# Patient Record
Sex: MALE | Race: WHITE | Employment: OTHER | URBAN - METROPOLITAN AREA
[De-identification: names, ages, dates, MRNs, and addresses within clinical notes are randomized per-mention and may not be internally consistent; named-entity substitution may affect disease eponyms.]

---

## 2017-05-02 ENCOUNTER — LAB ENCOUNTER (OUTPATIENT)
Dept: LAB | Facility: HOSPITAL | Age: 67
End: 2017-05-02
Attending: INTERNAL MEDICINE
Payer: MEDICARE

## 2017-05-02 DIAGNOSIS — Z12.5 SCREENING PSA (PROSTATE SPECIFIC ANTIGEN): ICD-10-CM

## 2017-05-02 DIAGNOSIS — E78.00 PURE HYPERCHOLESTEROLEMIA: Primary | ICD-10-CM

## 2017-05-02 PROCEDURE — 85025 COMPLETE CBC W/AUTO DIFF WBC: CPT

## 2017-05-02 PROCEDURE — 36415 COLL VENOUS BLD VENIPUNCTURE: CPT

## 2017-05-02 PROCEDURE — 80053 COMPREHEN METABOLIC PANEL: CPT

## 2017-05-02 PROCEDURE — 80061 LIPID PANEL: CPT

## 2017-12-05 ENCOUNTER — LAB ENCOUNTER (OUTPATIENT)
Dept: LAB | Facility: HOSPITAL | Age: 67
End: 2017-12-05
Attending: INTERNAL MEDICINE
Payer: MEDICARE

## 2017-12-05 DIAGNOSIS — E78.5 HYPERLIPIDEMIA: ICD-10-CM

## 2017-12-05 DIAGNOSIS — R73.01 IMPAIRED FASTING GLUCOSE: ICD-10-CM

## 2017-12-05 DIAGNOSIS — Z12.5 ENCOUNTER FOR SCREENING FOR MALIGNANT NEOPLASM OF PROSTATE: Primary | ICD-10-CM

## 2017-12-05 PROCEDURE — 80061 LIPID PANEL: CPT

## 2017-12-05 PROCEDURE — 36415 COLL VENOUS BLD VENIPUNCTURE: CPT

## 2017-12-05 PROCEDURE — 85025 COMPLETE CBC W/AUTO DIFF WBC: CPT

## 2017-12-05 PROCEDURE — 83036 HEMOGLOBIN GLYCOSYLATED A1C: CPT

## 2017-12-05 PROCEDURE — 80053 COMPREHEN METABOLIC PANEL: CPT

## 2019-02-12 ENCOUNTER — LAB ENCOUNTER (OUTPATIENT)
Dept: LAB | Facility: HOSPITAL | Age: 69
End: 2019-02-12
Attending: INTERNAL MEDICINE
Payer: MEDICARE

## 2019-02-12 DIAGNOSIS — E78.5 HYPERLIPEMIA: ICD-10-CM

## 2019-02-12 DIAGNOSIS — R03.0 ELEVATED BLOOD PRESSURE READING WITHOUT DIAGNOSIS OF HYPERTENSION: ICD-10-CM

## 2019-02-12 DIAGNOSIS — Z12.5 SPECIAL SCREENING FOR MALIGNANT NEOPLASM OF PROSTATE: ICD-10-CM

## 2019-02-12 DIAGNOSIS — R73.03 BORDERLINE DIABETES: Primary | ICD-10-CM

## 2019-02-12 LAB
ALBUMIN SERPL-MCNC: 3.8 G/DL (ref 3.4–5)
ALP LIVER SERPL-CCNC: 84 U/L (ref 45–117)
ALT SERPL-CCNC: 39 U/L (ref 16–61)
ANION GAP SERPL CALC-SCNC: 8 MMOL/L (ref 0–18)
AST SERPL-CCNC: 38 U/L (ref 15–37)
BASOPHILS # BLD AUTO: 0.09 X10(3) UL (ref 0–0.2)
BASOPHILS NFR BLD AUTO: 1 %
BILIRUB DIRECT SERPL-MCNC: 0.1 MG/DL (ref 0–0.2)
BILIRUB SERPL-MCNC: 0.6 MG/DL (ref 0.1–2)
BILIRUB UR QL: NEGATIVE
BUN BLD-MCNC: 14 MG/DL (ref 7–18)
BUN/CREAT SERPL: 14.3 (ref 10–20)
CALCIUM BLD-MCNC: 8.8 MG/DL (ref 8.5–10.1)
CHLORIDE SERPL-SCNC: 104 MMOL/L (ref 98–107)
CHOLEST SMN-MCNC: 173 MG/DL (ref ?–200)
CLARITY UR: CLEAR
CO2 SERPL-SCNC: 27 MMOL/L (ref 21–32)
COLOR UR: YELLOW
COMPLEXED PSA SERPL-MCNC: 1.87 NG/ML (ref ?–4)
CREAT BLD-MCNC: 0.98 MG/DL (ref 0.7–1.3)
DEPRECATED RDW RBC AUTO: 44.4 FL (ref 35.1–46.3)
EOSINOPHIL # BLD AUTO: 0.42 X10(3) UL (ref 0–0.7)
EOSINOPHIL NFR BLD AUTO: 4.8 %
ERYTHROCYTE [DISTWIDTH] IN BLOOD BY AUTOMATED COUNT: 13.2 % (ref 11–15)
EST. AVERAGE GLUCOSE BLD GHB EST-MCNC: 137 MG/DL (ref 68–126)
GLUCOSE BLD-MCNC: 118 MG/DL (ref 70–99)
GLUCOSE UR-MCNC: NEGATIVE MG/DL
HBA1C MFR BLD HPLC: 6.4 % (ref ?–5.7)
HCT VFR BLD AUTO: 46.1 % (ref 39–53)
HDLC SERPL-MCNC: 44 MG/DL (ref 40–59)
HGB BLD-MCNC: 15.5 G/DL (ref 13–17.5)
HGB UR QL STRIP.AUTO: NEGATIVE
IMM GRANULOCYTES # BLD AUTO: 0.03 X10(3) UL (ref 0–1)
IMM GRANULOCYTES NFR BLD: 0.3 %
KETONES UR-MCNC: NEGATIVE MG/DL
LDLC SERPL CALC-MCNC: 99 MG/DL (ref ?–100)
LEUKOCYTE ESTERASE UR QL STRIP.AUTO: NEGATIVE
LYMPHOCYTES # BLD AUTO: 3.26 X10(3) UL (ref 1–4)
LYMPHOCYTES NFR BLD AUTO: 37 %
M PROTEIN MFR SERPL ELPH: 8.1 G/DL (ref 6.4–8.2)
MCH RBC QN AUTO: 31.1 PG (ref 26–34)
MCHC RBC AUTO-ENTMCNC: 33.6 G/DL (ref 31–37)
MCV RBC AUTO: 92.4 FL (ref 80–100)
MONOCYTES # BLD AUTO: 0.8 X10(3) UL (ref 0.1–1)
MONOCYTES NFR BLD AUTO: 9.1 %
NEUTROPHILS # BLD AUTO: 4.2 X10 (3) UL (ref 1.5–7.7)
NEUTROPHILS # BLD AUTO: 4.2 X10(3) UL (ref 1.5–7.7)
NEUTROPHILS NFR BLD AUTO: 47.8 %
NITRITE UR QL STRIP.AUTO: NEGATIVE
NONHDLC SERPL-MCNC: 129 MG/DL (ref ?–130)
OSMOLALITY SERPL CALC.SUM OF ELEC: 290 MOSM/KG (ref 275–295)
PH UR: 5 [PH] (ref 5–8)
PLATELET # BLD AUTO: 276 10(3)UL (ref 150–450)
POTASSIUM SERPL-SCNC: 4.2 MMOL/L (ref 3.5–5.1)
PROT UR-MCNC: NEGATIVE MG/DL
RBC # BLD AUTO: 4.99 X10(6)UL (ref 3.8–5.8)
SODIUM SERPL-SCNC: 139 MMOL/L (ref 136–145)
SP GR UR STRIP: 1.02 (ref 1–1.03)
TRIGL SERPL-MCNC: 148 MG/DL (ref 30–149)
UROBILINOGEN UR STRIP-ACNC: <2
VIT C UR-MCNC: NEGATIVE MG/DL
WBC # BLD AUTO: 8.8 X10(3) UL (ref 4–11)

## 2019-02-12 PROCEDURE — 81003 URINALYSIS AUTO W/O SCOPE: CPT

## 2019-02-12 PROCEDURE — 80048 BASIC METABOLIC PNL TOTAL CA: CPT

## 2019-02-12 PROCEDURE — 85025 COMPLETE CBC W/AUTO DIFF WBC: CPT

## 2019-02-12 PROCEDURE — 80076 HEPATIC FUNCTION PANEL: CPT

## 2019-02-12 PROCEDURE — 80061 LIPID PANEL: CPT

## 2019-02-12 PROCEDURE — 83036 HEMOGLOBIN GLYCOSYLATED A1C: CPT

## 2019-02-12 PROCEDURE — 36415 COLL VENOUS BLD VENIPUNCTURE: CPT

## 2019-02-25 ENCOUNTER — LAB ENCOUNTER (OUTPATIENT)
Dept: LAB | Facility: HOSPITAL | Age: 69
End: 2019-02-25
Attending: INTERNAL MEDICINE
Payer: MEDICARE

## 2019-02-25 DIAGNOSIS — Z11.59 ENCOUNTER FOR SCREENING FOR OTHER VIRAL DISEASES: Primary | ICD-10-CM

## 2019-02-25 PROCEDURE — 86803 HEPATITIS C AB TEST: CPT

## 2019-02-25 PROCEDURE — 36415 COLL VENOUS BLD VENIPUNCTURE: CPT

## 2019-02-26 LAB — HCV AB SERPL QL IA: NONREACTIVE

## 2021-04-18 ENCOUNTER — LAB ENCOUNTER (OUTPATIENT)
Dept: LAB | Facility: HOSPITAL | Age: 71
End: 2021-04-18
Attending: INTERNAL MEDICINE
Payer: MEDICARE

## 2021-04-18 DIAGNOSIS — F41.9 ANXIETY HYPERVENTILATION: ICD-10-CM

## 2021-04-18 DIAGNOSIS — F45.8 ANXIETY HYPERVENTILATION: ICD-10-CM

## 2021-04-18 PROCEDURE — 80053 COMPREHEN METABOLIC PANEL: CPT

## 2021-04-18 PROCEDURE — 36415 COLL VENOUS BLD VENIPUNCTURE: CPT

## 2021-04-18 PROCEDURE — 84443 ASSAY THYROID STIM HORMONE: CPT

## 2021-04-18 PROCEDURE — 80061 LIPID PANEL: CPT

## 2021-04-18 PROCEDURE — 85025 COMPLETE CBC W/AUTO DIFF WBC: CPT

## 2021-05-11 ENCOUNTER — ORDER TRANSCRIPTION (OUTPATIENT)
Dept: SLEEP CENTER | Age: 71
End: 2021-05-11

## 2021-05-11 DIAGNOSIS — G47.33 OBSTRUCTIVE SLEEP APNEA (ADULT) (PEDIATRIC): Primary | ICD-10-CM

## 2021-10-05 ENCOUNTER — OFFICE VISIT (OUTPATIENT)
Dept: SLEEP CENTER | Age: 71
End: 2021-10-05
Attending: INTERNAL MEDICINE
Payer: MEDICARE

## 2021-10-05 DIAGNOSIS — G47.33 OBSTRUCTIVE SLEEP APNEA (ADULT) (PEDIATRIC): ICD-10-CM

## 2021-10-05 DIAGNOSIS — G47.33 OSA (OBSTRUCTIVE SLEEP APNEA): Primary | ICD-10-CM

## 2021-10-05 PROCEDURE — 95806 SLEEP STUDY UNATT&RESP EFFT: CPT

## 2021-10-07 NOTE — PROCEDURES
[address] [facility]  Accredited by the [facility] ([facility])    PATIENT'S NAME: Robert Mora PHYSICIAN: Monserrat Macias MD   REFERRING PHYSICIAN: Monserrat Macias MD   PATIENT ACCOUNT #: [de-identified] [facility]

## 2022-02-07 ENCOUNTER — LAB ENCOUNTER (OUTPATIENT)
Dept: LAB | Facility: HOSPITAL | Age: 72
End: 2022-02-07
Attending: INTERNAL MEDICINE
Payer: MEDICARE

## 2022-02-07 DIAGNOSIS — E11.9 DIABETES MELLITUS (HCC): Primary | ICD-10-CM

## 2022-02-07 DIAGNOSIS — Z13.220 SCREENING FOR LIPOID DISORDERS: ICD-10-CM

## 2022-02-07 LAB
ALBUMIN SERPL-MCNC: 4 G/DL (ref 3.4–5)
ALBUMIN/GLOB SERPL: 1 {RATIO} (ref 1–2)
ALP LIVER SERPL-CCNC: 87 U/L
ALT SERPL-CCNC: 40 U/L
ANION GAP SERPL CALC-SCNC: 7 MMOL/L (ref 0–18)
AST SERPL-CCNC: 26 U/L (ref 15–37)
BASOPHILS # BLD AUTO: 0.1 X10(3) UL (ref 0–0.2)
BASOPHILS NFR BLD AUTO: 1.1 %
BILIRUB SERPL-MCNC: 0.5 MG/DL (ref 0.1–2)
BUN BLD-MCNC: 10 MG/DL (ref 7–18)
BUN/CREAT SERPL: 9.4 (ref 10–20)
CALCIUM BLD-MCNC: 9.6 MG/DL (ref 8.5–10.1)
CHLORIDE SERPL-SCNC: 103 MMOL/L (ref 98–112)
CO2 SERPL-SCNC: 27 MMOL/L (ref 21–32)
CREAT BLD-MCNC: 1.06 MG/DL
CREAT UR-SCNC: 144 MG/DL
DEPRECATED RDW RBC AUTO: 46.1 FL (ref 35.1–46.3)
EOSINOPHIL # BLD AUTO: 0.38 X10(3) UL (ref 0–0.7)
EOSINOPHIL NFR BLD AUTO: 4.3 %
ERYTHROCYTE [DISTWIDTH] IN BLOOD BY AUTOMATED COUNT: 13.2 % (ref 11–15)
FASTING PATIENT LIPID ANSWER: YES
FASTING STATUS PATIENT QL REPORTED: YES
GLOBULIN PLAS-MCNC: 4.2 G/DL (ref 2.8–4.4)
GLUCOSE BLD-MCNC: 135 MG/DL (ref 70–99)
HCT VFR BLD AUTO: 48.8 %
HDLC SERPL-MCNC: 43 MG/DL (ref 40–59)
HGB BLD-MCNC: 16.1 G/DL
IMM GRANULOCYTES # BLD AUTO: 0.04 X10(3) UL (ref 0–1)
LDLC SERPL CALC-MCNC: 91 MG/DL (ref ?–100)
LYMPHOCYTES # BLD AUTO: 3.12 X10(3) UL (ref 1–4)
LYMPHOCYTES NFR BLD AUTO: 35.3 %
MCH RBC QN AUTO: 31.1 PG (ref 26–34)
MCHC RBC AUTO-ENTMCNC: 33 G/DL (ref 31–37)
MCV RBC AUTO: 94.2 FL
MICROALBUMIN UR-MCNC: 18.7 MG/DL
MICROALBUMIN/CREAT 24H UR-RTO: 129.9 UG/MG (ref ?–30)
MONOCYTES # BLD AUTO: 0.61 X10(3) UL (ref 0.1–1)
MONOCYTES NFR BLD AUTO: 6.9 %
NEUTROPHILS # BLD AUTO: 4.6 X10 (3) UL (ref 1.5–7.7)
NEUTROPHILS # BLD AUTO: 4.6 X10(3) UL (ref 1.5–7.7)
NEUTROPHILS NFR BLD AUTO: 51.9 %
NONHDLC SERPL-MCNC: 114 MG/DL (ref ?–130)
OSMOLALITY SERPL CALC.SUM OF ELEC: 285 MOSM/KG (ref 275–295)
PLATELET # BLD AUTO: 254 10(3)UL (ref 150–450)
POTASSIUM SERPL-SCNC: 4.5 MMOL/L (ref 3.5–5.1)
PROT SERPL-MCNC: 8.2 G/DL (ref 6.4–8.2)
RBC # BLD AUTO: 5.18 X10(6)UL
SODIUM SERPL-SCNC: 137 MMOL/L (ref 136–145)
TRIGL SERPL-MCNC: 127 MG/DL (ref 30–149)
VLDLC SERPL CALC-MCNC: 21 MG/DL (ref 0–30)
WBC # BLD AUTO: 8.9 X10(3) UL (ref 4–11)

## 2022-02-07 PROCEDURE — 80053 COMPREHEN METABOLIC PANEL: CPT

## 2022-02-07 PROCEDURE — 80061 LIPID PANEL: CPT

## 2022-02-07 PROCEDURE — 36415 COLL VENOUS BLD VENIPUNCTURE: CPT

## 2022-02-07 PROCEDURE — 82043 UR ALBUMIN QUANTITATIVE: CPT

## 2022-02-07 PROCEDURE — 82570 ASSAY OF URINE CREATININE: CPT

## 2022-02-07 PROCEDURE — 85025 COMPLETE CBC W/AUTO DIFF WBC: CPT

## 2023-03-30 ENCOUNTER — LAB ENCOUNTER (OUTPATIENT)
Dept: LAB | Facility: HOSPITAL | Age: 73
End: 2023-03-30
Attending: INTERNAL MEDICINE
Payer: MEDICARE

## 2023-03-30 DIAGNOSIS — E11.9 DIABETES MELLITUS (HCC): Primary | ICD-10-CM

## 2023-03-30 LAB
ALBUMIN SERPL-MCNC: 3.7 G/DL (ref 3.4–5)
ALBUMIN/GLOB SERPL: 1 {RATIO} (ref 1–2)
ALP LIVER SERPL-CCNC: 74 U/L
ALT SERPL-CCNC: 26 U/L
ANION GAP SERPL CALC-SCNC: 5 MMOL/L (ref 0–18)
AST SERPL-CCNC: 21 U/L (ref 15–37)
BASOPHILS # BLD AUTO: 0.07 X10(3) UL (ref 0–0.2)
BASOPHILS NFR BLD AUTO: 0.9 %
BILIRUB SERPL-MCNC: 0.5 MG/DL (ref 0.1–2)
BUN BLD-MCNC: 14 MG/DL (ref 7–18)
BUN/CREAT SERPL: 17.5 (ref 10–20)
CALCIUM BLD-MCNC: 8.9 MG/DL (ref 8.5–10.1)
CHLORIDE SERPL-SCNC: 105 MMOL/L (ref 98–112)
CHOLEST SERPL-MCNC: 138 MG/DL (ref ?–200)
CO2 SERPL-SCNC: 30 MMOL/L (ref 21–32)
CREAT BLD-MCNC: 0.8 MG/DL
CREAT UR-SCNC: 17.4 MG/DL
DEPRECATED RDW RBC AUTO: 49.7 FL (ref 35.1–46.3)
EOSINOPHIL # BLD AUTO: 0.23 X10(3) UL (ref 0–0.7)
EOSINOPHIL NFR BLD AUTO: 3 %
ERYTHROCYTE [DISTWIDTH] IN BLOOD BY AUTOMATED COUNT: 13.9 % (ref 11–15)
FASTING PATIENT LIPID ANSWER: NO
FASTING STATUS PATIENT QL REPORTED: NO
GFR SERPLBLD BASED ON 1.73 SQ M-ARVRAT: 94 ML/MIN/1.73M2 (ref 60–?)
GLOBULIN PLAS-MCNC: 3.7 G/DL (ref 2.8–4.4)
GLUCOSE BLD-MCNC: 103 MG/DL (ref 70–99)
HCT VFR BLD AUTO: 43.3 %
HDLC SERPL-MCNC: 68 MG/DL (ref 40–59)
HGB BLD-MCNC: 14.5 G/DL
IMM GRANULOCYTES # BLD AUTO: 0.01 X10(3) UL (ref 0–1)
IMM GRANULOCYTES NFR BLD: 0.1 %
LDLC SERPL CALC-MCNC: 47 MG/DL (ref ?–100)
LYMPHOCYTES # BLD AUTO: 2.65 X10(3) UL (ref 1–4)
LYMPHOCYTES NFR BLD AUTO: 34.1 %
MCH RBC QN AUTO: 32.2 PG (ref 26–34)
MCHC RBC AUTO-ENTMCNC: 33.5 G/DL (ref 31–37)
MCV RBC AUTO: 96.2 FL
MICROALBUMIN UR-MCNC: 1.6 MG/DL
MICROALBUMIN/CREAT 24H UR-RTO: 92 UG/MG (ref ?–30)
MONOCYTES # BLD AUTO: 0.63 X10(3) UL (ref 0.1–1)
MONOCYTES NFR BLD AUTO: 8.1 %
NEUTROPHILS # BLD AUTO: 4.18 X10 (3) UL (ref 1.5–7.7)
NEUTROPHILS # BLD AUTO: 4.18 X10(3) UL (ref 1.5–7.7)
NEUTROPHILS NFR BLD AUTO: 53.8 %
NONHDLC SERPL-MCNC: 70 MG/DL (ref ?–130)
OSMOLALITY SERPL CALC.SUM OF ELEC: 291 MOSM/KG (ref 275–295)
PLATELET # BLD AUTO: 217 10(3)UL (ref 150–450)
POTASSIUM SERPL-SCNC: 4.2 MMOL/L (ref 3.5–5.1)
PROT SERPL-MCNC: 7.4 G/DL (ref 6.4–8.2)
RBC # BLD AUTO: 4.5 X10(6)UL
SODIUM SERPL-SCNC: 140 MMOL/L (ref 136–145)
TRIGL SERPL-MCNC: 138 MG/DL (ref 30–149)
VLDLC SERPL CALC-MCNC: 19 MG/DL (ref 0–30)
WBC # BLD AUTO: 7.8 X10(3) UL (ref 4–11)

## 2023-03-30 PROCEDURE — 36415 COLL VENOUS BLD VENIPUNCTURE: CPT

## 2023-03-30 PROCEDURE — 85025 COMPLETE CBC W/AUTO DIFF WBC: CPT

## 2023-03-30 PROCEDURE — 80061 LIPID PANEL: CPT

## 2023-03-30 PROCEDURE — 80053 COMPREHEN METABOLIC PANEL: CPT

## 2023-03-30 PROCEDURE — 82570 ASSAY OF URINE CREATININE: CPT

## 2023-03-30 PROCEDURE — 82043 UR ALBUMIN QUANTITATIVE: CPT

## 2024-03-21 ENCOUNTER — APPOINTMENT (OUTPATIENT)
Dept: CT IMAGING | Facility: HOSPITAL | Age: 74
End: 2024-03-21
Attending: EMERGENCY MEDICINE
Payer: MEDICARE

## 2024-03-21 ENCOUNTER — ANESTHESIA (OUTPATIENT)
Dept: SURGERY | Facility: HOSPITAL | Age: 74
End: 2024-03-21
Payer: MEDICARE

## 2024-03-21 ENCOUNTER — APPOINTMENT (OUTPATIENT)
Dept: GENERAL RADIOLOGY | Facility: HOSPITAL | Age: 74
DRG: 219 | End: 2024-03-21
Attending: EMERGENCY MEDICINE
Payer: MEDICARE

## 2024-03-21 ENCOUNTER — ANESTHESIA EVENT (OUTPATIENT)
Dept: SURGERY | Facility: HOSPITAL | Age: 74
End: 2024-03-21
Payer: MEDICARE

## 2024-03-21 ENCOUNTER — APPOINTMENT (OUTPATIENT)
Dept: GENERAL RADIOLOGY | Facility: HOSPITAL | Age: 74
DRG: 219 | End: 2024-03-21
Attending: CLINICAL NURSE SPECIALIST
Payer: MEDICARE

## 2024-03-21 ENCOUNTER — APPOINTMENT (OUTPATIENT)
Dept: GENERAL RADIOLOGY | Facility: HOSPITAL | Age: 74
End: 2024-03-21
Attending: EMERGENCY MEDICINE
Payer: MEDICARE

## 2024-03-21 ENCOUNTER — HOSPITAL ENCOUNTER (INPATIENT)
Facility: HOSPITAL | Age: 74
LOS: 7 days | Discharge: HOME | DRG: 219 | End: 2024-03-28
Attending: EMERGENCY MEDICINE | Admitting: HOSPITALIST
Payer: MEDICARE

## 2024-03-21 ENCOUNTER — HOSPITAL ENCOUNTER (INPATIENT)
Facility: HOSPITAL | Age: 74
LOS: 7 days | Discharge: HOME HEALTH | End: 2024-03-28
Attending: EMERGENCY MEDICINE | Admitting: HOSPITALIST
Payer: MEDICARE

## 2024-03-21 ENCOUNTER — APPOINTMENT (OUTPATIENT)
Dept: CT IMAGING | Facility: HOSPITAL | Age: 74
DRG: 219 | End: 2024-03-21
Attending: EMERGENCY MEDICINE
Payer: MEDICARE

## 2024-03-21 ENCOUNTER — APPOINTMENT (OUTPATIENT)
Dept: GENERAL RADIOLOGY | Facility: HOSPITAL | Age: 74
End: 2024-03-21
Attending: CLINICAL NURSE SPECIALIST
Payer: MEDICARE

## 2024-03-21 DIAGNOSIS — I71.13 RUPTURED ANEURYSM OF DESCENDING THORACIC AORTA (HCC): ICD-10-CM

## 2024-03-21 DIAGNOSIS — Z98.890 S/P AORTIC DISSECTION REPAIR: ICD-10-CM

## 2024-03-21 DIAGNOSIS — I71.03 DISSECTION OF THORACOABDOMINAL AORTA (HCC): Primary | ICD-10-CM

## 2024-03-21 LAB
ANION GAP SERPL CALC-SCNC: 8 MMOL/L (ref 0–18)
ANION GAP SERPL CALC-SCNC: 8 MMOL/L (ref 0–18)
ANTIBODY SCREEN: NEGATIVE
APTT PPP: 29.4 SECONDS (ref 23.3–35.6)
APTT PPP: 37.3 SECONDS (ref 23.3–35.6)
ATRIAL RATE: 44 BPM
ATRIAL RATE: 51 BPM
BASE EXCESS BLD CALC-SCNC: 1.9 MMOL/L (ref ?–2)
BASE EXCESS BLD CALC-SCNC: 4.1 MMOL/L (ref ?–2)
BASE EXCESS BLDA CALC-SCNC: -2 MMOL/L (ref ?–30)
BASE EXCESS BLDA CALC-SCNC: -3 MMOL/L (ref ?–30)
BASE EXCESS BLDA CALC-SCNC: -4 MMOL/L (ref ?–30)
BASE EXCESS BLDA CALC-SCNC: -4 MMOL/L (ref ?–30)
BASE EXCESS BLDA CALC-SCNC: -6 MMOL/L (ref ?–30)
BASOPHILS # BLD AUTO: 0.01 X10(3) UL (ref 0–0.2)
BASOPHILS # BLD AUTO: 0.05 X10(3) UL (ref 0–0.2)
BASOPHILS NFR BLD AUTO: 0.1 %
BASOPHILS NFR BLD AUTO: 0.6 %
BUN BLD-MCNC: 17 MG/DL (ref 9–23)
BUN BLD-MCNC: 18 MG/DL (ref 9–23)
BUN/CREAT SERPL: 17.5 (ref 10–20)
BUN/CREAT SERPL: 21.8 (ref 10–20)
CA-I BLD-SCNC: 0.98 MMOL/L (ref 0.95–1.32)
CA-I BLDA-SCNC: 0.52 MMOL/L (ref 1.12–1.32)
CA-I BLDA-SCNC: 1.06 MMOL/L (ref 1.12–1.32)
CA-I BLDA-SCNC: 1.09 MMOL/L (ref 1.12–1.32)
CA-I BLDA-SCNC: 1.12 MMOL/L (ref 1.12–1.32)
CA-I BLDA-SCNC: 1.22 MMOL/L (ref 1.12–1.32)
CALCIUM BLD-MCNC: 7.3 MG/DL (ref 8.7–10.4)
CALCIUM BLD-MCNC: 9.3 MG/DL (ref 8.7–10.4)
CHLORIDE SERPL-SCNC: 107 MMOL/L (ref 98–112)
CHLORIDE SERPL-SCNC: 113 MMOL/L (ref 98–112)
CO2 BLDA-SCNC: 19 MMOL/L (ref 22–32)
CO2 BLDA-SCNC: 24 MMOL/L (ref 22–32)
CO2 BLDA-SCNC: 24 MMOL/L (ref 22–32)
CO2 BLDA-SCNC: 25 MMOL/L (ref 22–32)
CO2 BLDA-SCNC: 25 MMOL/L (ref 22–32)
CO2 SERPL-SCNC: 25 MMOL/L (ref 21–32)
CO2 SERPL-SCNC: 25 MMOL/L (ref 21–32)
COHGB MFR BLD: 1.5 % (ref 0–3)
CREAT BLD-MCNC: 0.78 MG/DL
CREAT BLD-MCNC: 1.03 MG/DL
D DIMER PPP FEU-MCNC: >20 UG/ML FEU (ref ?–0.73)
DEPRECATED RDW RBC AUTO: 47.3 FL (ref 35.1–46.3)
DEPRECATED RDW RBC AUTO: 47.4 FL (ref 35.1–46.3)
EGFRCR SERPLBLD CKD-EPI 2021: 77 ML/MIN/1.73M2 (ref 60–?)
EGFRCR SERPLBLD CKD-EPI 2021: 94 ML/MIN/1.73M2 (ref 60–?)
EOSINOPHIL # BLD AUTO: 0.03 X10(3) UL (ref 0–0.7)
EOSINOPHIL # BLD AUTO: 0.3 X10(3) UL (ref 0–0.7)
EOSINOPHIL NFR BLD AUTO: 0.2 %
EOSINOPHIL NFR BLD AUTO: 3.9 %
ERYTHROCYTE [DISTWIDTH] IN BLOOD BY AUTOMATED COUNT: 13.6 % (ref 11–15)
ERYTHROCYTE [DISTWIDTH] IN BLOOD BY AUTOMATED COUNT: 13.8 % (ref 11–15)
EST. AVERAGE GLUCOSE BLD GHB EST-MCNC: 105 MG/DL (ref 68–126)
FIBRINOGEN PPP-MCNC: 195 MG/DL (ref 180–480)
FIBRINOGEN PPP-MCNC: 224 MG/DL (ref 180–480)
GLUCOSE BLD-MCNC: 135 MG/DL (ref 70–99)
GLUCOSE BLD-MCNC: 142 MG/DL (ref 70–99)
GLUCOSE BLDA-MCNC: 116 MG/DL (ref 70–99)
GLUCOSE BLDA-MCNC: 126 MG/DL (ref 70–99)
GLUCOSE BLDA-MCNC: 128 MG/DL (ref 70–99)
GLUCOSE BLDA-MCNC: 157 MG/DL (ref 70–99)
GLUCOSE BLDA-MCNC: 165 MG/DL (ref 70–99)
GLUCOSE BLDC GLUCOMTR-MCNC: 154 MG/DL (ref 70–99)
GLUCOSE BLDC GLUCOMTR-MCNC: 167 MG/DL (ref 70–99)
GLUCOSE BLDC GLUCOMTR-MCNC: 201 MG/DL (ref 70–99)
GLUCOSE BLDC GLUCOMTR-MCNC: 206 MG/DL (ref 70–99)
HBA1C MFR BLD: 5.3 % (ref ?–5.7)
HBV SURFACE AG SER-ACNC: 0.14 [IU]/L
HBV SURFACE AG SERPL QL IA: NONREACTIVE
HCO3 BLDA-SCNC: 18.4 MEQ/L (ref 22–26)
HCO3 BLDA-SCNC: 22.4 MEQ/L (ref 22–26)
HCO3 BLDA-SCNC: 22.7 MEQ/L (ref 22–26)
HCO3 BLDA-SCNC: 23.6 MEQ/L (ref 22–26)
HCO3 BLDA-SCNC: 24 MEQ/L (ref 22–26)
HCO3 BLDA-SCNC: 28.1 MEQ/L (ref 21–27)
HCO3 BLDV-SCNC: 25.6 MEQ/L (ref 22–26)
HCT VFR BLD AUTO: 26.6 %
HCT VFR BLD AUTO: 42.1 %
HCT VFR BLDA CALC: 21 %
HCT VFR BLDA CALC: 29 %
HCT VFR BLDA CALC: 30 %
HCT VFR BLDA CALC: 33 %
HCT VFR BLDA CALC: 41 %
HCV AB SERPL QL IA: NONREACTIVE
HGB BLD-MCNC: 13.8 G/DL
HGB BLD-MCNC: 9 G/DL
HGB BLD-MCNC: 9.5 G/DL
HIV 1+2 AB+HIV1 P24 AG SERPL QL IA: NONREACTIVE
IMM GRANULOCYTES # BLD AUTO: 0.03 X10(3) UL (ref 0–1)
IMM GRANULOCYTES # BLD AUTO: 0.07 X10(3) UL (ref 0–1)
IMM GRANULOCYTES NFR BLD: 0.4 %
IMM GRANULOCYTES NFR BLD: 0.6 %
INR BLD: 1.07 (ref 0.8–1.2)
INR BLD: 1.5 (ref 0.8–1.2)
ISTAT ACTIVATED CLOTTING TIME: 158 SECONDS (ref 125–137)
ISTAT ACTIVATED CLOTTING TIME: 158 SECONDS (ref 125–137)
ISTAT ACTIVATED CLOTTING TIME: 374 SECONDS (ref 125–137)
ISTAT ACTIVATED CLOTTING TIME: >675 SECONDS (ref 125–137)
LACTATE BLD-SCNC: 2.4 MMOL/L (ref 0.5–2)
LACTATE SERPL-SCNC: 1.2 MMOL/L (ref 0.5–2)
LYMPHOCYTES # BLD AUTO: 0.82 X10(3) UL (ref 1–4)
LYMPHOCYTES # BLD AUTO: 2.2 X10(3) UL (ref 1–4)
LYMPHOCYTES NFR BLD AUTO: 28.3 %
LYMPHOCYTES NFR BLD AUTO: 6.8 %
MAGNESIUM SERPL-MCNC: 2 MG/DL (ref 1.6–2.6)
MCH RBC QN AUTO: 31 PG (ref 26–34)
MCH RBC QN AUTO: 31.8 PG (ref 26–34)
MCHC RBC AUTO-ENTMCNC: 32.8 G/DL (ref 31–37)
MCHC RBC AUTO-ENTMCNC: 33.8 G/DL (ref 31–37)
MCV RBC AUTO: 94 FL
MCV RBC AUTO: 94.6 FL
METHGB MFR BLD: 0.1 % SAT (ref 0.4–1.5)
MONOCYTES # BLD AUTO: 0.53 X10(3) UL (ref 0.1–1)
MONOCYTES # BLD AUTO: 0.99 X10(3) UL (ref 0.1–1)
MONOCYTES NFR BLD AUTO: 6.8 %
MONOCYTES NFR BLD AUTO: 8.2 %
NEUTROPHILS # BLD AUTO: 10.1 X10 (3) UL (ref 1.5–7.7)
NEUTROPHILS # BLD AUTO: 10.1 X10(3) UL (ref 1.5–7.7)
NEUTROPHILS # BLD AUTO: 4.67 X10 (3) UL (ref 1.5–7.7)
NEUTROPHILS # BLD AUTO: 4.67 X10(3) UL (ref 1.5–7.7)
NEUTROPHILS NFR BLD AUTO: 60 %
NEUTROPHILS NFR BLD AUTO: 84.1 %
O2 CT BLD-SCNC: 13.3 VOL% (ref 15–23)
O2/TOTAL GAS SETTING VFR VENT: 50 %
O2/TOTAL GAS SETTING VFR VENT: 50 %
OSMOLALITY SERPL CALC.SUM OF ELEC: 294 MOSM/KG (ref 275–295)
OSMOLALITY SERPL CALC.SUM OF ELEC: 306 MOSM/KG (ref 275–295)
P AXIS: 66 DEGREES
P AXIS: 79 DEGREES
P-R INTERVAL: 274 MS
P-R INTERVAL: 324 MS
PCO2 BLDA: 29.1 MMHG (ref 35–45)
PCO2 BLDA: 41 MM HG (ref 35–45)
PCO2 BLDA: 43.9 MMHG (ref 35–45)
PCO2 BLDA: 44.2 MMHG (ref 35–45)
PCO2 BLDA: 49.9 MMHG (ref 35–45)
PCO2 BLDA: 49.9 MMHG (ref 35–45)
PCO2 BLDV: 50 MM HG (ref 38–50)
PEEP SETTING VENT: 5 CM H2O
PEEP SETTING VENT: 5 CM H2O
PH BLDA: 7.27 [PH] (ref 7.35–7.45)
PH BLDA: 7.28 [PH] (ref 7.35–7.45)
PH BLDA: 7.31 [PH] (ref 7.35–7.45)
PH BLDA: 7.35 [PH] (ref 7.35–7.45)
PH BLDA: 7.41 [PH] (ref 7.35–7.45)
PH BLDA: 7.45 [PH] (ref 7.35–7.45)
PH BLDV: 7.36 [PH] (ref 7.32–7.43)
PLATELET # BLD AUTO: 112 10(3)UL (ref 150–450)
PLATELET # BLD AUTO: 137 10(3)UL (ref 150–450)
PLATELET # BLD AUTO: 162 10(3)UL (ref 150–450)
PO2 BLDA: 171 MM HG (ref 80–100)
PO2 BLDA: 384 MMHG (ref 80–105)
PO2 BLDA: 389 MMHG (ref 80–105)
PO2 BLDA: >400 MMHG (ref 80–105)
PO2 BLDV: 36 MM HG (ref 35–40)
POTASSIUM BLD-SCNC: 3.9 MMOL/L (ref 3.6–5.1)
POTASSIUM SERPL-SCNC: 4.4 MMOL/L (ref 3.5–5.1)
POTASSIUM SERPL-SCNC: 4.6 MMOL/L (ref 3.5–5.1)
PRESSURE SUPPORT SETTING VENT: 10 CM H2O
PRESSURE SUPPORT SETTING VENT: 10 CM H2O
PROTHROMBIN TIME: 14.6 SECONDS (ref 11.6–14.8)
PROTHROMBIN TIME: 19 SECONDS (ref 11.6–14.8)
PUNCTURE CHARGE: NO
PUNCTURE CHARGE: NO
Q-T INTERVAL: 466 MS
Q-T INTERVAL: 526 MS
QRS DURATION: 142 MS
QRS DURATION: 142 MS
QTC CALCULATION (BEZET): 429 MS
QTC CALCULATION (BEZET): 449 MS
R AXIS: 55 DEGREES
R AXIS: 66 DEGREES
RBC # BLD AUTO: 2.83 X10(6)UL
RBC # BLD AUTO: 4.45 X10(6)UL
RESP RATE: 1 BPM
RESP RATE: 12 BPM
RH BLOOD TYPE: POSITIVE
RH BLOOD TYPE: POSITIVE
SAO2 % BLDA: 100 % (ref 92–100)
SAO2 % BLDA: 96.6 % (ref 94–100)
SAO2 % BLDV: 65.5 % (ref 60–85)
SODIUM BLD-SCNC: 141 MMOL/L (ref 135–145)
SODIUM BLDA-SCNC: 140 MMOL/L (ref 136–145)
SODIUM BLDA-SCNC: 141 MMOL/L (ref 136–145)
SODIUM BLDA-SCNC: 141 MMOL/L (ref 136–145)
SODIUM BLDA-SCNC: 142 MMOL/L (ref 136–145)
SODIUM BLDA-SCNC: 142 MMOL/L (ref 136–145)
SODIUM BLDA-SCNC: 3.9 MMOL/L (ref 3.6–5.1)
SODIUM BLDA-SCNC: 4.4 MMOL/L (ref 3.6–5.1)
SODIUM BLDA-SCNC: 4.5 MMOL/L (ref 3.6–5.1)
SODIUM BLDA-SCNC: 4.8 MMOL/L (ref 3.6–5.1)
SODIUM BLDA-SCNC: 5 MMOL/L (ref 3.6–5.1)
SODIUM SERPL-SCNC: 140 MMOL/L (ref 136–145)
SODIUM SERPL-SCNC: 146 MMOL/L (ref 136–145)
SPECIMEN VOL 24H UR: 600 ML
SPECIMEN VOL 24H UR: 800 ML
T AXIS: 45 DEGREES
T AXIS: 59 DEGREES
TSI SER-ACNC: 2.5 MIU/ML (ref 0.55–4.78)
VENTRICULAR RATE: 44 BPM
VENTRICULAR RATE: 51 BPM
WBC # BLD AUTO: 12 X10(3) UL (ref 4–11)
WBC # BLD AUTO: 7.8 X10(3) UL (ref 4–11)

## 2024-03-21 PROCEDURE — 02HV33Z INSERTION OF INFUSION DEVICE INTO SUPERIOR VENA CAVA, PERCUTANEOUS APPROACH: ICD-10-PCS | Performed by: THORACIC SURGERY (CARDIOTHORACIC VASCULAR SURGERY)

## 2024-03-21 PROCEDURE — 30233L1 TRANSFUSION OF NONAUTOLOGOUS FRESH PLASMA INTO PERIPHERAL VEIN, PERCUTANEOUS APPROACH: ICD-10-PCS | Performed by: THORACIC SURGERY (CARDIOTHORACIC VASCULAR SURGERY)

## 2024-03-21 PROCEDURE — 02RX0JZ REPLACEMENT OF THORACIC AORTA, ASCENDING/ARCH WITH SYNTHETIC SUBSTITUTE, OPEN APPROACH: ICD-10-PCS | Performed by: THORACIC SURGERY (CARDIOTHORACIC VASCULAR SURGERY)

## 2024-03-21 PROCEDURE — 71045 X-RAY EXAM CHEST 1 VIEW: CPT | Performed by: CLINICAL NURSE SPECIALIST

## 2024-03-21 PROCEDURE — 5A1221Z PERFORMANCE OF CARDIAC OUTPUT, CONTINUOUS: ICD-10-PCS | Performed by: THORACIC SURGERY (CARDIOTHORACIC VASCULAR SURGERY)

## 2024-03-21 PROCEDURE — XW03372 INTRODUCTION OF INACTIVATED COAGULATION FACTOR XA INTO PERIPHERAL VEIN, PERCUTANEOUS APPROACH, NEW TECHNOLOGY GROUP 2: ICD-10-PCS | Performed by: THORACIC SURGERY (CARDIOTHORACIC VASCULAR SURGERY)

## 2024-03-21 PROCEDURE — 99223 1ST HOSP IP/OBS HIGH 75: CPT | Performed by: HOSPITALIST

## 2024-03-21 PROCEDURE — 71045 X-RAY EXAM CHEST 1 VIEW: CPT | Performed by: EMERGENCY MEDICINE

## 2024-03-21 PROCEDURE — 74177 CT ABD & PELVIS W/CONTRAST: CPT | Performed by: EMERGENCY MEDICINE

## 2024-03-21 PROCEDURE — 74175 CTA ABDOMEN W/CONTRAST: CPT | Performed by: EMERGENCY MEDICINE

## 2024-03-21 PROCEDURE — 71275 CT ANGIOGRAPHY CHEST: CPT | Performed by: EMERGENCY MEDICINE

## 2024-03-21 PROCEDURE — B24BZZ4 ULTRASONOGRAPHY OF HEART WITH AORTA, TRANSESOPHAGEAL: ICD-10-PCS | Performed by: THORACIC SURGERY (CARDIOTHORACIC VASCULAR SURGERY)

## 2024-03-21 PROCEDURE — 3E033XZ INTRODUCTION OF VASOPRESSOR INTO PERIPHERAL VEIN, PERCUTANEOUS APPROACH: ICD-10-PCS | Performed by: THORACIC SURGERY (CARDIOTHORACIC VASCULAR SURGERY)

## 2024-03-21 PROCEDURE — 99223 1ST HOSP IP/OBS HIGH 75: CPT | Performed by: INTERNAL MEDICINE

## 2024-03-21 PROCEDURE — 36430 TRANSFUSION BLD/BLD COMPNT: CPT | Performed by: ANESTHESIOLOGY

## 2024-03-21 PROCEDURE — 93312 ECHO TRANSESOPHAGEAL: CPT | Performed by: ANESTHESIOLOGY

## 2024-03-21 DEVICE — IMPLANTABLE DEVICE: Type: IMPLANTABLE DEVICE | Site: HEART | Status: FUNCTIONAL

## 2024-03-21 DEVICE — BARD® PTFE FELT, 2.5 CM X 2.5 CM
Type: IMPLANTABLE DEVICE | Site: HEART | Status: FUNCTIONAL
Brand: BARD® PTFE FELT

## 2024-03-21 DEVICE — AGENT HEMSTAT 2X4IN HUM FIBRIN HUM THROM COMP: Type: IMPLANTABLE DEVICE | Site: HEART | Status: FUNCTIONAL

## 2024-03-21 RX ORDER — MORPHINE SULFATE 4 MG/ML
INJECTION, SOLUTION INTRAMUSCULAR; INTRAVENOUS
Status: COMPLETED
Start: 2024-03-21 | End: 2024-03-21

## 2024-03-21 RX ORDER — ROCURONIUM BROMIDE 10 MG/ML
INJECTION, SOLUTION INTRAVENOUS AS NEEDED
Status: DISCONTINUED | OUTPATIENT
Start: 2024-03-21 | End: 2024-03-21 | Stop reason: SURG

## 2024-03-21 RX ORDER — LIDOCAINE HYDROCHLORIDE 10 MG/ML
INJECTION, SOLUTION EPIDURAL; INFILTRATION; INTRACAUDAL; PERINEURAL AS NEEDED
Status: DISCONTINUED | OUTPATIENT
Start: 2024-03-21 | End: 2024-03-21 | Stop reason: SURG

## 2024-03-21 RX ORDER — SENNOSIDES 8.8 MG/5ML
10 LIQUID ORAL NIGHTLY PRN
Status: DISCONTINUED | OUTPATIENT
Start: 2024-03-21 | End: 2024-03-21

## 2024-03-21 RX ORDER — HYDROCODONE BITARTRATE AND ACETAMINOPHEN 5; 325 MG/1; MG/1
2 TABLET ORAL EVERY 4 HOURS PRN
Status: DISCONTINUED | OUTPATIENT
Start: 2024-03-21 | End: 2024-03-28

## 2024-03-21 RX ORDER — CHLORHEXIDINE GLUCONATE ORAL RINSE 1.2 MG/ML
15 SOLUTION DENTAL
Status: DISCONTINUED | OUTPATIENT
Start: 2024-03-21 | End: 2024-03-21

## 2024-03-21 RX ORDER — MIDAZOLAM HYDROCHLORIDE 1 MG/ML
INJECTION INTRAMUSCULAR; INTRAVENOUS AS NEEDED
Status: DISCONTINUED | OUTPATIENT
Start: 2024-03-21 | End: 2024-03-21 | Stop reason: SURG

## 2024-03-21 RX ORDER — DOBUTAMINE HYDROCHLORIDE 200 MG/100ML
INJECTION INTRAVENOUS CONTINUOUS PRN
Status: DISCONTINUED | OUTPATIENT
Start: 2024-03-21 | End: 2024-03-26

## 2024-03-21 RX ORDER — SENNOSIDES 8.6 MG
17.2 TABLET ORAL NIGHTLY PRN
Status: DISCONTINUED | OUTPATIENT
Start: 2024-03-21 | End: 2024-03-28

## 2024-03-21 RX ORDER — ACETAMINOPHEN 10 MG/ML
1000 INJECTION, SOLUTION INTRAVENOUS EVERY 6 HOURS PRN
Status: DISCONTINUED | OUTPATIENT
Start: 2024-03-21 | End: 2024-03-21

## 2024-03-21 RX ORDER — ACETAMINOPHEN 325 MG/1
650 TABLET ORAL EVERY 4 HOURS PRN
Status: DISCONTINUED | OUTPATIENT
Start: 2024-03-21 | End: 2024-03-28

## 2024-03-21 RX ORDER — MORPHINE SULFATE 2 MG/ML
2 INJECTION, SOLUTION INTRAMUSCULAR; INTRAVENOUS EVERY 2 HOUR PRN
Status: DISCONTINUED | OUTPATIENT
Start: 2024-03-21 | End: 2024-03-27

## 2024-03-21 RX ORDER — ATROPINE SULFATE 1 MG/ML
INJECTION, SOLUTION INTRAMUSCULAR; INTRAVENOUS; SUBCUTANEOUS AS NEEDED
Status: DISCONTINUED | OUTPATIENT
Start: 2024-03-21 | End: 2024-03-21 | Stop reason: SURG

## 2024-03-21 RX ORDER — ASPIRIN 81 MG/1
81 TABLET ORAL DAILY
Status: DISCONTINUED | OUTPATIENT
Start: 2024-03-22 | End: 2024-03-24 | Stop reason: ALTCHOICE

## 2024-03-21 RX ORDER — SODIUM CHLORIDE 9 MG/ML
83 INJECTION, SOLUTION INTRAVENOUS CONTINUOUS
Status: DISCONTINUED | OUTPATIENT
Start: 2024-03-21 | End: 2024-03-22 | Stop reason: ALTCHOICE

## 2024-03-21 RX ORDER — CEFAZOLIN SODIUM/WATER 2 G/20 ML
2 SYRINGE (ML) INTRAVENOUS EVERY 8 HOURS
Status: COMPLETED | OUTPATIENT
Start: 2024-03-21 | End: 2024-03-23

## 2024-03-21 RX ORDER — ALBUMIN, HUMAN INJ 5% 5 %
12.5 SOLUTION INTRAVENOUS ONCE AS NEEDED
Status: ACTIVE | OUTPATIENT
Start: 2024-03-21 | End: 2024-03-22

## 2024-03-21 RX ORDER — NITROGLYCERIN 20 MG/100ML
INJECTION INTRAVENOUS CONTINUOUS PRN
Status: DISCONTINUED | OUTPATIENT
Start: 2024-03-21 | End: 2024-03-21 | Stop reason: SURG

## 2024-03-21 RX ORDER — DEXMEDETOMIDINE HYDROCHLORIDE 4 UG/ML
INJECTION, SOLUTION INTRAVENOUS CONTINUOUS
Status: DISCONTINUED | OUTPATIENT
Start: 2024-03-21 | End: 2024-03-26

## 2024-03-21 RX ORDER — ENOXAPARIN SODIUM 100 MG/ML
40 INJECTION SUBCUTANEOUS DAILY
Status: DISCONTINUED | OUTPATIENT
Start: 2024-03-22 | End: 2024-03-28

## 2024-03-21 RX ORDER — MILRINONE LACTATE 0.2 MG/ML
INJECTION, SOLUTION INTRAVENOUS AS NEEDED
Status: DISCONTINUED | OUTPATIENT
Start: 2024-03-21 | End: 2024-03-26

## 2024-03-21 RX ORDER — ENEMA 19; 7 G/133ML; G/133ML
1 ENEMA RECTAL ONCE AS NEEDED
Status: DISCONTINUED | OUTPATIENT
Start: 2024-03-21 | End: 2024-03-28

## 2024-03-21 RX ORDER — ASCORBIC ACID 500 MG
500 TABLET ORAL 3 TIMES DAILY
Status: DISCONTINUED | OUTPATIENT
Start: 2024-03-22 | End: 2024-03-28

## 2024-03-21 RX ORDER — CHLORHEXIDINE GLUCONATE ORAL RINSE 1.2 MG/ML
15 SOLUTION DENTAL 2 TIMES DAILY
Status: DISCONTINUED | OUTPATIENT
Start: 2024-03-21 | End: 2024-03-27

## 2024-03-21 RX ORDER — NITROGLYCERIN 20 MG/100ML
INJECTION INTRAVENOUS CONTINUOUS PRN
Status: DISCONTINUED | OUTPATIENT
Start: 2024-03-21 | End: 2024-03-26

## 2024-03-21 RX ORDER — ONDANSETRON 2 MG/ML
4 INJECTION INTRAMUSCULAR; INTRAVENOUS ONCE
Status: COMPLETED | OUTPATIENT
Start: 2024-03-21 | End: 2024-03-21

## 2024-03-21 RX ORDER — MAGNESIUM SULFATE 1 G/100ML
1 INJECTION INTRAVENOUS AS NEEDED
Status: DISCONTINUED | OUTPATIENT
Start: 2024-03-21 | End: 2024-03-28

## 2024-03-21 RX ORDER — HYDROCODONE BITARTRATE AND ACETAMINOPHEN 5; 325 MG/1; MG/1
1 TABLET ORAL EVERY 4 HOURS PRN
Status: DISCONTINUED | OUTPATIENT
Start: 2024-03-21 | End: 2024-03-28

## 2024-03-21 RX ORDER — LIDOCAINE HYDROCHLORIDE 10 MG/ML
INJECTION, SOLUTION INFILTRATION; PERINEURAL
Status: COMPLETED | OUTPATIENT
Start: 2024-03-21 | End: 2024-03-21

## 2024-03-21 RX ORDER — PHENYLEPHRINE HCL 10 MG/ML
VIAL (ML) INJECTION AS NEEDED
Status: DISCONTINUED | OUTPATIENT
Start: 2024-03-21 | End: 2024-03-21 | Stop reason: SURG

## 2024-03-21 RX ORDER — DEXTROSE MONOHYDRATE AND SODIUM CHLORIDE 5; .9 G/100ML; G/100ML
INJECTION, SOLUTION INTRAVENOUS CONTINUOUS
Status: DISCONTINUED | OUTPATIENT
Start: 2024-03-21 | End: 2024-03-25

## 2024-03-21 RX ORDER — CLONAZEPAM 1 MG/1
TABLET ORAL
COMMUNITY
Start: 2021-10-27

## 2024-03-21 RX ORDER — DEXMEDETOMIDINE HYDROCHLORIDE 4 UG/ML
INJECTION, SOLUTION INTRAVENOUS CONTINUOUS
Status: DISCONTINUED | OUTPATIENT
Start: 2024-03-21 | End: 2024-03-21

## 2024-03-21 RX ORDER — MORPHINE SULFATE 2 MG/ML
2 INJECTION, SOLUTION INTRAMUSCULAR; INTRAVENOUS ONCE
Status: COMPLETED | OUTPATIENT
Start: 2024-03-21 | End: 2024-03-21

## 2024-03-21 RX ORDER — ACETAMINOPHEN 160 MG/5ML
650 SOLUTION ORAL EVERY 4 HOURS PRN
Status: DISCONTINUED | OUTPATIENT
Start: 2024-03-21 | End: 2024-03-21

## 2024-03-21 RX ORDER — DOBUTAMINE HYDROCHLORIDE 200 MG/100ML
INJECTION INTRAVENOUS CONTINUOUS PRN
Status: DISCONTINUED | OUTPATIENT
Start: 2024-03-21 | End: 2024-03-21 | Stop reason: SURG

## 2024-03-21 RX ORDER — POLYETHYLENE GLYCOL 3350 17 G/17G
17 POWDER, FOR SOLUTION ORAL DAILY PRN
Status: DISCONTINUED | OUTPATIENT
Start: 2024-03-21 | End: 2024-03-28

## 2024-03-21 RX ORDER — DOCUSATE SODIUM 100 MG/1
100 CAPSULE, LIQUID FILLED ORAL 2 TIMES DAILY
Status: DISCONTINUED | OUTPATIENT
Start: 2024-03-22 | End: 2024-03-24

## 2024-03-21 RX ORDER — POLYETHYLENE GLYCOL 3350 17 G/17G
17 POWDER, FOR SOLUTION ORAL DAILY PRN
Status: DISCONTINUED | OUTPATIENT
Start: 2024-03-21 | End: 2024-03-21

## 2024-03-21 RX ORDER — METOCLOPRAMIDE HYDROCHLORIDE 5 MG/ML
10 INJECTION INTRAMUSCULAR; INTRAVENOUS EVERY 6 HOURS
Status: DISCONTINUED | OUTPATIENT
Start: 2024-03-21 | End: 2024-03-28

## 2024-03-21 RX ORDER — ACETAMINOPHEN 10 MG/ML
1000 INJECTION, SOLUTION INTRAVENOUS EVERY 8 HOURS
Status: COMPLETED | OUTPATIENT
Start: 2024-03-21 | End: 2024-03-22

## 2024-03-21 RX ORDER — SODIUM CHLORIDE 9 MG/ML
INJECTION, SOLUTION INTRAVENOUS CONTINUOUS PRN
Status: DISCONTINUED | OUTPATIENT
Start: 2024-03-21 | End: 2024-03-21 | Stop reason: SURG

## 2024-03-21 RX ORDER — DEXMEDETOMIDINE HYDROCHLORIDE 4 UG/ML
INJECTION, SOLUTION INTRAVENOUS
Status: DISPENSED
Start: 2024-03-21 | End: 2024-03-22

## 2024-03-21 RX ORDER — DEXTROSE MONOHYDRATE 25 G/50ML
50 INJECTION, SOLUTION INTRAVENOUS
Status: DISCONTINUED | OUTPATIENT
Start: 2024-03-21 | End: 2024-03-28

## 2024-03-21 RX ORDER — FAMOTIDINE 10 MG/ML
20 INJECTION, SOLUTION INTRAVENOUS 2 TIMES DAILY
Status: DISCONTINUED | OUTPATIENT
Start: 2024-03-21 | End: 2024-03-23

## 2024-03-21 RX ORDER — HEPARIN SODIUM 1000 [USP'U]/ML
INJECTION, SOLUTION INTRAVENOUS; SUBCUTANEOUS AS NEEDED
Status: DISCONTINUED | OUTPATIENT
Start: 2024-03-21 | End: 2024-03-21 | Stop reason: SURG

## 2024-03-21 RX ORDER — EPHEDRINE SULFATE 50 MG/ML
INJECTION INTRAVENOUS AS NEEDED
Status: DISCONTINUED | OUTPATIENT
Start: 2024-03-21 | End: 2024-03-21 | Stop reason: SURG

## 2024-03-21 RX ORDER — ONDANSETRON 2 MG/ML
INJECTION INTRAMUSCULAR; INTRAVENOUS
Status: DISPENSED
Start: 2024-03-21 | End: 2024-03-21

## 2024-03-21 RX ORDER — POTASSIUM CHLORIDE 29.8 MG/ML
40 INJECTION INTRAVENOUS AS NEEDED
Status: DISCONTINUED | OUTPATIENT
Start: 2024-03-21 | End: 2024-03-28

## 2024-03-21 RX ORDER — BISACODYL 10 MG
10 SUPPOSITORY, RECTAL RECTAL
Status: DISCONTINUED | OUTPATIENT
Start: 2024-03-21 | End: 2024-03-21

## 2024-03-21 RX ORDER — MORPHINE SULFATE 4 MG/ML
4 INJECTION, SOLUTION INTRAMUSCULAR; INTRAVENOUS EVERY 2 HOUR PRN
Status: DISCONTINUED | OUTPATIENT
Start: 2024-03-21 | End: 2024-03-27

## 2024-03-21 RX ORDER — MORPHINE SULFATE 4 MG/ML
8 INJECTION, SOLUTION INTRAMUSCULAR; INTRAVENOUS EVERY 2 HOUR PRN
Status: DISCONTINUED | OUTPATIENT
Start: 2024-03-21 | End: 2024-03-26

## 2024-03-21 RX ORDER — ENEMA 19; 7 G/133ML; G/133ML
1 ENEMA RECTAL ONCE AS NEEDED
Status: DISCONTINUED | OUTPATIENT
Start: 2024-03-21 | End: 2024-03-21

## 2024-03-21 RX ORDER — BISACODYL 10 MG
10 SUPPOSITORY, RECTAL RECTAL
Status: DISCONTINUED | OUTPATIENT
Start: 2024-03-21 | End: 2024-03-28

## 2024-03-21 RX ORDER — POTASSIUM CHLORIDE 14.9 MG/ML
20 INJECTION INTRAVENOUS AS NEEDED
Status: DISCONTINUED | OUTPATIENT
Start: 2024-03-21 | End: 2024-03-28

## 2024-03-21 RX ORDER — DOXEPIN HYDROCHLORIDE 50 MG/1
1 CAPSULE ORAL DAILY
Status: DISCONTINUED | OUTPATIENT
Start: 2024-03-22 | End: 2024-03-28

## 2024-03-21 RX ORDER — FAMOTIDINE 20 MG/1
20 TABLET, FILM COATED ORAL 2 TIMES DAILY
Status: DISCONTINUED | OUTPATIENT
Start: 2024-03-21 | End: 2024-03-23

## 2024-03-21 RX ORDER — NITROGLYCERIN 20 MG/100ML
INJECTION INTRAVENOUS
Status: COMPLETED
Start: 2024-03-21 | End: 2024-03-22

## 2024-03-21 RX ORDER — MORPHINE SULFATE 2 MG/ML
1 INJECTION, SOLUTION INTRAMUSCULAR; INTRAVENOUS ONCE
Status: COMPLETED | OUTPATIENT
Start: 2024-03-21 | End: 2024-03-21

## 2024-03-21 RX ORDER — KETOROLAC TROMETHAMINE 30 MG/ML
15 INJECTION, SOLUTION INTRAMUSCULAR; INTRAVENOUS EVERY 6 HOURS PRN
Status: DISPENSED | OUTPATIENT
Start: 2024-03-21 | End: 2024-03-23

## 2024-03-21 RX ORDER — ONDANSETRON 2 MG/ML
4 INJECTION INTRAMUSCULAR; INTRAVENOUS EVERY 6 HOURS PRN
Status: DISCONTINUED | OUTPATIENT
Start: 2024-03-21 | End: 2024-03-28

## 2024-03-21 RX ORDER — MELATONIN
3 NIGHTLY PRN
Status: DISCONTINUED | OUTPATIENT
Start: 2024-03-21 | End: 2024-03-28

## 2024-03-21 RX ORDER — NICOTINE POLACRILEX 4 MG
30 LOZENGE BUCCAL
Status: DISCONTINUED | OUTPATIENT
Start: 2024-03-21 | End: 2024-03-28

## 2024-03-21 RX ORDER — VANCOMYCIN HYDROCHLORIDE 1 G/20ML
INJECTION, POWDER, LYOPHILIZED, FOR SOLUTION INTRAVENOUS AS NEEDED
Status: DISCONTINUED | OUTPATIENT
Start: 2024-03-21 | End: 2024-03-21 | Stop reason: HOSPADM

## 2024-03-21 RX ORDER — SENNOSIDES 8.6 MG
8.6 TABLET ORAL 2 TIMES DAILY
Status: DISCONTINUED | OUTPATIENT
Start: 2024-03-22 | End: 2024-03-28

## 2024-03-21 RX ORDER — CEFAZOLIN SODIUM/WATER 2 G/20 ML
SYRINGE (ML) INTRAVENOUS AS NEEDED
Status: DISCONTINUED | OUTPATIENT
Start: 2024-03-21 | End: 2024-03-21 | Stop reason: SURG

## 2024-03-21 RX ORDER — DEXMEDETOMIDINE HYDROCHLORIDE 4 UG/ML
INJECTION, SOLUTION INTRAVENOUS CONTINUOUS PRN
Status: DISCONTINUED | OUTPATIENT
Start: 2024-03-21 | End: 2024-03-21 | Stop reason: SURG

## 2024-03-21 RX ORDER — NICOTINE POLACRILEX 4 MG
15 LOZENGE BUCCAL
Status: DISCONTINUED | OUTPATIENT
Start: 2024-03-21 | End: 2024-03-28

## 2024-03-21 RX ORDER — SODIUM CHLORIDE 9 MG/ML
INJECTION, SOLUTION INTRAVENOUS ONCE
Status: COMPLETED | OUTPATIENT
Start: 2024-03-21 | End: 2024-03-21

## 2024-03-21 RX ORDER — ALBUMIN, HUMAN INJ 5% 5 %
SOLUTION INTRAVENOUS
Status: DISCONTINUED
Start: 2024-03-21 | End: 2024-03-22 | Stop reason: WASHOUT

## 2024-03-21 RX ORDER — MAGNESIUM SULFATE HEPTAHYDRATE 40 MG/ML
2 INJECTION, SOLUTION INTRAVENOUS AS NEEDED
Status: DISCONTINUED | OUTPATIENT
Start: 2024-03-21 | End: 2024-03-28

## 2024-03-21 RX ORDER — PROTAMINE SULFATE 10 MG/ML
INJECTION, SOLUTION INTRAVENOUS AS NEEDED
Status: DISCONTINUED | OUTPATIENT
Start: 2024-03-21 | End: 2024-03-21 | Stop reason: SURG

## 2024-03-21 RX ORDER — ACETAMINOPHEN 325 MG/1
650 TABLET ORAL EVERY 4 HOURS PRN
Status: DISCONTINUED | OUTPATIENT
Start: 2024-03-21 | End: 2024-03-21

## 2024-03-21 RX ADMIN — PHENYLEPHRINE HCL 50 MCG: 10 MG/ML VIAL (ML) INJECTION at 14:05:00

## 2024-03-21 RX ADMIN — ROCURONIUM BROMIDE 50 MG: 10 INJECTION, SOLUTION INTRAVENOUS at 18:53:00

## 2024-03-21 RX ADMIN — LIDOCAINE HYDROCHLORIDE 50 MG: 10 INJECTION, SOLUTION EPIDURAL; INFILTRATION; INTRACAUDAL; PERINEURAL at 13:09:00

## 2024-03-21 RX ADMIN — MIDAZOLAM HYDROCHLORIDE 1 MG: 1 INJECTION INTRAMUSCULAR; INTRAVENOUS at 13:04:00

## 2024-03-21 RX ADMIN — CEFAZOLIN SODIUM/WATER 2 G: 2 G/20 ML SYRINGE (ML) INTRAVENOUS at 13:45:00

## 2024-03-21 RX ADMIN — PHENYLEPHRINE HCL 100 MCG: 10 MG/ML VIAL (ML) INJECTION at 14:07:00

## 2024-03-21 RX ADMIN — DOBUTAMINE HYDROCHLORIDE 2 MCG/KG/MIN: 200 INJECTION INTRAVENOUS at 16:56:00

## 2024-03-21 RX ADMIN — PHENYLEPHRINE HCL 50 MCG: 10 MG/ML VIAL (ML) INJECTION at 14:06:00

## 2024-03-21 RX ADMIN — SODIUM CHLORIDE: 9 INJECTION, SOLUTION INTRAVENOUS at 14:58:00

## 2024-03-21 RX ADMIN — PHENYLEPHRINE HCL 100 MCG: 10 MG/ML VIAL (ML) INJECTION at 14:17:00

## 2024-03-21 RX ADMIN — SODIUM CHLORIDE: 9 INJECTION, SOLUTION INTRAVENOUS at 13:03:00

## 2024-03-21 RX ADMIN — HEPARIN SODIUM 39000 UNITS: 1000 INJECTION, SOLUTION INTRAVENOUS; SUBCUTANEOUS at 14:04:00

## 2024-03-21 RX ADMIN — EPHEDRINE SULFATE 5 MG: 50 INJECTION INTRAVENOUS at 13:22:00

## 2024-03-21 RX ADMIN — ATROPINE SULFATE 0.5 MG: 1 INJECTION, SOLUTION INTRAMUSCULAR; INTRAVENOUS; SUBCUTANEOUS at 13:10:00

## 2024-03-21 RX ADMIN — PHENYLEPHRINE HCL 100 MCG: 10 MG/ML VIAL (ML) INJECTION at 14:20:00

## 2024-03-21 RX ADMIN — PHENYLEPHRINE HCL 100 MCG: 10 MG/ML VIAL (ML) INJECTION at 14:22:00

## 2024-03-21 RX ADMIN — DEXMEDETOMIDINE HYDROCHLORIDE 0.5 MCG/KG/HR: 4 INJECTION, SOLUTION INTRAVENOUS at 14:00:00

## 2024-03-21 RX ADMIN — PHENYLEPHRINE HCL 50 MCG: 10 MG/ML VIAL (ML) INJECTION at 14:18:00

## 2024-03-21 RX ADMIN — SODIUM CHLORIDE: 9 INJECTION, SOLUTION INTRAVENOUS at 14:01:00

## 2024-03-21 RX ADMIN — ROCURONIUM BROMIDE 50 MG: 10 INJECTION, SOLUTION INTRAVENOUS at 13:11:00

## 2024-03-21 RX ADMIN — LIDOCAINE HYDROCHLORIDE 5 ML: 10 INJECTION, SOLUTION INFILTRATION; PERINEURAL at 13:05:00

## 2024-03-21 RX ADMIN — PROTAMINE SULFATE 400 MG: 10 INJECTION, SOLUTION INTRAVENOUS at 17:38:00

## 2024-03-21 RX ADMIN — NITROGLYCERIN 5 MCG/MIN: 20 INJECTION INTRAVENOUS at 17:37:00

## 2024-03-21 RX ADMIN — ROCURONIUM BROMIDE 50 MG: 10 INJECTION, SOLUTION INTRAVENOUS at 17:08:00

## 2024-03-21 RX ADMIN — MIDAZOLAM HYDROCHLORIDE 1 MG: 1 INJECTION INTRAMUSCULAR; INTRAVENOUS at 13:08:00

## 2024-03-21 RX ADMIN — ROCURONIUM BROMIDE 50 MG: 10 INJECTION, SOLUTION INTRAVENOUS at 13:35:00

## 2024-03-21 RX ADMIN — SODIUM CHLORIDE: 9 INJECTION, SOLUTION INTRAVENOUS at 14:02:00

## 2024-03-21 RX ADMIN — SODIUM CHLORIDE: 9 INJECTION, SOLUTION INTRAVENOUS at 20:10:00

## 2024-03-21 RX ADMIN — EPHEDRINE SULFATE 5 MG: 50 INJECTION INTRAVENOUS at 14:11:00

## 2024-03-21 RX ADMIN — PHENYLEPHRINE HCL 50 MCG: 10 MG/ML VIAL (ML) INJECTION at 13:43:00

## 2024-03-21 RX ADMIN — PROTAMINE SULFATE 50 MG: 10 INJECTION, SOLUTION INTRAVENOUS at 18:26:00

## 2024-03-21 RX ADMIN — PHENYLEPHRINE HCL 50 MCG: 10 MG/ML VIAL (ML) INJECTION at 14:21:00

## 2024-03-21 NOTE — PROGRESS NOTES
Misc. Note    Called to ER per ER MD at approx 1150am for an Emergent Aortic Dissection. Pt seen in ER w/wife present. CV Surgeon aware and pt seen in ER by Surgeon. 2-daughters arrived during the visit. Verbal info provided regarding red op expectations and risks. Consents obtained.

## 2024-03-21 NOTE — ANESTHESIA PROCEDURE NOTES
Central Line    Date/Time: 3/21/2024 1:22 PM    Performed by: Cathy Ma MD  Authorized by: Cathy Ma MD    General Information and Staff    Procedure Start:  3/21/2024 1:22 PM  Procedure End:  3/21/2024 1:31 PM  Anesthesiologist:  Cathy Ma MD  Performed by:  Anesthesiologist  Patient Location:  OR  Indication: central venous access and CVP monitoring    Site Identification: real time ultrasound guided, surface landmarks and image stored and retrievable    Preanesthetic Checklist: 2 patient identifiers, IV checked, risks and benefits discussed, monitors and equipment checked, pre-op evaluation, timeout performed, anesthesia consent and sterile technique used    Procedure Detail    Patient Position:  Trendelenburg  Laterality:  Right  Site:  Internal jugular  Prep:  Chloraprep  Catheter Size:  9 Fr  Catheter Length (cm):  10  Catheter Type:  MAC introducer  Number of Lumens:  Double lumen  Oximetric Catheter?: Yes    Procedure Detail: target vein identified, needle advanced into vein and blood aspirated and guidewire advanced into vein    Seldinger Technique?: Yes    Intravenous Verification: verified by ultrasound and venous blood return    Post Insertion: all ports aspirated, all ports flushed easily, guidewire was removed intact, line was sutured in place and dressing was applied      Assessment    Events: patient tolerated procedure well with no complications      PA Catheter Placement    PA Catheter Placed?: Yes    PA Catheter Type:  Oximetric  PA Catheter Size:  8  Laterality:  Right  Site:  Internal jugular  Placement Confirmation: pressure tracing changes and verified by [place]    PA Catheter Depth (cm):  49  Events: patient tolerated procedure well with no complications      Additional Comments     PA catheter floated easily without resistance, not wedged

## 2024-03-21 NOTE — ANESTHESIA PROCEDURE NOTES
Arterial Line    Date/Time: 3/21/2024 1:05 PM    Performed by: Cathy Ma MD  Authorized by: Cathy Ma MD    General Information and Staff    Procedure Start:  3/21/2024 1:05 PM  Procedure End:  3/21/2024 1:08 PM  Anesthesiologist:  Magan Lam MD  Performed By:  Anesthesiologist  Patient Location:  OR  Indication: continuous blood pressure monitoring and blood sampling needed    Site Identification: real time ultrasound guided and surface landmarks    Preanesthetic Checklist: 2 patient identifiers, IV checked, risks and benefits discussed, monitors and equipment checked, pre-op evaluation, timeout performed, anesthesia consent and sterile technique used    Procedure Details    Catheter Size:  20 G  Catheter Length:  1 and 3/4 inch  Catheter Type:  Arrow  Seldinger Technique?: Yes    Site:  Radial artery  Site Prep: chlorhexidine    Line Secured:  Wrist Brace, tape and Tegaderm    Assessment    Events: patient tolerated procedure well with no complications      Medications  3/21/2024 1:05 PM  lidocaine injection 1% - Intradermal   5 mL - 3/21/2024 1:05:00 PM    Additional Comments

## 2024-03-21 NOTE — ANESTHESIA PREPROCEDURE EVALUATION
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 No pertinent surgical history.    (Not in a hospital admission)    Current Facility-Administered Medications Ordered in Epic   Medication Dose Route Frequency Provider Last Rate Last Admin    sodium chloride 0.9 % IV bolus 1,000 mL  1,000 mL Intravenous Once Otis Maurer MD 1,000 mL/hr at 03/21/24 1225 1,000 mL at 03/21/24 1225    norepinephrine (Levophed) 4 mg/250mL infusion premix  0.5-30 mcg/min Intravenous Continuous Otis Maurer MD        morphINE PF 2 MG/ML injection 1 mg  1 mg Intravenous Once Otis Maurer MD         Current Outpatient Medications Ordered in Epic   Medication Sig Dispense Refill    clonazePAM 1 MG Oral Tab 1/2 tablet every morning, afternoon, and night orally 3 times a day for 90 days         Allergies   Allergen Reactions    Serotonin Reuptake Inhibitors DIARRHEA       No family history on file.  Social History     Socioeconomic History    Marital status:        Available pre-op labs reviewed.  Lab Results   Component Value Date    WBC 7.8 03/21/2024    RBC 4.45 03/21/2024    HGB 13.8 03/21/2024    HCT 42.1 03/21/2024    MCV 94.6 03/21/2024    MCH 31.0 03/21/2024    MCHC 32.8 03/21/2024    RDW 13.6 03/21/2024    .0 03/21/2024     Lab Results   Component Value Date     03/21/2024    K 4.6 03/21/2024     03/21/2024    CO2 25.0 03/21/2024    BUN 18 03/21/2024    CREATSERUM 1.03  03/21/2024     (H) 03/21/2024    CA 9.3 03/21/2024     Lab Results   Component Value Date    INR 1.07 03/21/2024       Vital Signs:  There is no height or weight on file to calculate BMI.   temporal temperature is 97.3 °F (36.3 °C). His blood pressure is 88/77 (abnormal) and his pulse is 66. His respiration is 20 and oxygen saturation is 93%.   Vitals:    03/21/24 1216 03/21/24 1221 03/21/24 1226 03/21/24 1231   BP: 94/70 95/79 (!) 88/71 (!) 88/77   Pulse: 50 53 (!) 48 66   Resp: 17 20 13 20   Temp:       TempSrc:       SpO2: 100% 100% 100% 93%        Anesthesia Evaluation     Patient summary reviewed and Nursing notes reviewed    No history of anesthetic complications   Airway   Mallampati: II  TM distance: >3 FB  Neck ROM: full  Dental          Pulmonary - normal exam   (+) COPD, sleep apnea    ROS comment: Smoker since teens, \"stopped\" 5 years ago but still \"cheats  Cardiovascular - normal exam  Exercise tolerance: good    ROS comment: + aortic dissection    Neuro/Psych    (+)  anxiety/panic attacks,        GI/Hepatic/Renal - negative ROS     Endo/Other    (+) diabetes mellitus type 2 well controlled  Abdominal   (+) obese                 Anesthesia Plan:   ASA:  4  Emergent    Plan:   General  Monitors and Lines:   A-line, Additonal IV, BIS, Brain oximetry, [place], Central line, [place] and CVP  Airway:  ETT and Video laryngoscope  Post-op Pain Management: IV analgesics  Plan Comments: Brought to OR emergently for aortic dissection.   NPO since 6 am.  Informed Consent Plan and Risks Discussed With:  Patient  Use of Blood Products Discussed With:  Patient  Blood Product Use Consented    Discussed plan with:  Surgeon      I have informed Talib Gonzalez and/or legal guardian or family member of the nature of the anesthetic plan, benefits, risks including possible dental damage if relevant, major complications, and any alternative forms of anesthetic management.   All of the patient's questions were  answered to the best of my ability. The patient desires the anesthetic management as planned.  Cathy Ma MD  3/21/2024 12:35 PM  Present on Admission:  **None**

## 2024-03-21 NOTE — ED PROVIDER NOTES
Patient Seen in: [facility] Emergency Department    History     Chief Complaint   Patient presents with    Dizziness     Stated Complaint: DIZZY    HPI    Patient complains of lower  abdominal pain that began this morning.  Patient reports he almost passed out when he went to the bathroom.  Had many loose bowel movements non bloody.  Having sharp crampy lower abd pain.  Pain rated as 9/10.  Modifying factors include: none.          Past Medical History:   Diagnosis Date    Anxiety        History reviewed. No pertinent surgical history.         No family history on file.    Social History     Socioeconomic History    Marital status:        Review of Systems    Positive for stated complaint: DIZZY  Other systems are as noted in HPI.  Constitutional and vital signs reviewed.      All other systems reviewed and negative except as noted above.    PSFH elements reviewed from today and agreed except as otherwise stated in HPI.    Physical Exam     ED Triage Vitals   BP 03/21/24 0902 112/50   Pulse 03/21/24 0901 53   Resp 03/21/24 0901 25   Temp 03/21/24 0935 97.3 °F (36.3 °C)   Temp src 03/21/24 0935 Temporal   SpO2 03/21/24 0902 97 %   O2 Device 03/21/24 0902 None (Room air)       Current:BP (!) 81/44   Pulse (!) 46   Temp 97.3 °F (36.3 °C) (Temporal)   Resp 10   Ht 182.9 cm (6')   Wt 98.4 kg   SpO2 100%   BMI 29.43 kg/m²   Pulse ox nl        Physical Exam  General Appearance: appears pale  Eyes: pupils equal and round no pallor or injection  ENT, Mouth: mucous membranes moist  Respiratory: there are no retractions, lungs are clear to auscultation  Cardiovascular: regular rate and rhythm    Gastrointestinal: tender in l lower quadrant with some guarding  Neurological: II-XII grossly intact  no focal deficits  Skin: warm and dry, no rashes.  Musculoskeletal: neck is supple non tender        Extremities are symmetrical, full range of motion  Psychiatric: patient is pleasant, there is no  agitation    DIFFERENTIAL DIAGNOSIS: After history and physical exam differential diagnosis was considered for  diverticulitis vs. Colitis vs. Vascular etiology vs. enteritis          ED Course     Labs Reviewed   BASIC METABOLIC PANEL (8) - Abnormal; Notable for the following components:       Result Value    Glucose 135 (*)     All other components within normal limits   CBC W/ DIFFERENTIAL - Abnormal; Notable for the following components:    RDW-SD 47.4 (*)     All other components within normal limits   TSH W REFLEX TO FREE T4 - Normal   PROTHROMBIN TIME (PT) - Normal   PTT, ACTIVATED - Normal   LACTIC ACID, PLASMA - Normal   HCV ANTIBODY - Normal   RAPID HIV - Normal   CBC WITH DIFFERENTIAL WITH PLATELET    Narrative:     The following orders were created for panel order CBC With Differential With Platelet.  Procedure                               Abnormality         Status                     ---------                               -----------         ------                     CBC W/ DIFFERENTIAL[[id number]]          Abnormal            Final result                 Please view results for these tests on the individual orders.   SOURCE PANEL (EXPOSURE)    Narrative:     The following orders were created for panel order Source Panel (Exposure).  Procedure                               Abnormality         Status                     ---------                               -----------         ------                     Hepatitis B Surface Antigen[[id number]]                      Final result               HCV Antibody[[id number]]                 Normal              Final result               RAPID HIV[[id number]]                    Normal              Final result                 Please view results for these tests on the individual orders.   HEPATITIS B SURFACE ANTIGEN   URINALYSIS, ROUTINE   TYPE AND SCREEN    Narrative:     The following orders were created for panel order Type and screen.  Procedure                                Abnormality         Status                     ---------                               -----------         ------                     ABORH (Blood Type)[[id number]]                               Final result               Antibody Screen[[id number]]                                  Final result                 Please view results for these tests on the individual orders.   ABORH (BLOOD TYPE)   ANTIBODY SCREEN   PREPARE RBC   PREPARE PLATELETS   PREPARE FRESH FROZEN PLASMA   ABORH CONFIRMATION   PREPARE CRYOPRECIPITATE   PREPARE RBC   SURGICAL PATHOLOGY TISSUE   RAINBOW DRAW LAVENDER   RAINBOW DRAW LIGHT GREEN   RAINBOW DRAW BLUE     EKG    Rate, intervals and axes as noted on EKG Report.  Rate: 51  Rhythm: Sinus Rhythm  Reading: s jennie first degree av block, rbbb    EKG# 2   Sinus jennie rate 44 first degreee av block with rbbb           MDM         Cardiac Monitor:   Pulse Readings from Last 1 Encounters:   03/21/24 (!) 46   , sinus, jennie with occasional pause  interpreted by me.    Radiology findings:  I personally reviewed the images. XR CHEST AP PORTABLE  (CPT=71045)    Result Date: 3/21/2024  PROCEDURE: XR CHEST AP PORTABLE  (CPT=71045) TIME: 1233  COMPARISON: None.  INDICATIONS: Central Line Placement  TECHNIQUE:   Single view.   Findings and impression:  Right IJ catheter in the mid SVC  Normal heart size with no edema  Lungs are clear  Normal pleura  No free air     Dictated by (CST): Sebastian Alicea MD on 3/21/2024 at 12:52 PM     Finalized by (CST): Sebastian Alicea MD on 3/21/2024 at 12:54 PM          CTA CHEST+CTA ABDOMEN DISSECT SET (CPT=71275/04396)    Result Date: 3/21/2024  CONCLUSION:  Massive type A dissection extending from the aortic root to the bilateral common iliac arteries.  The dissection extends into the aortic arch great vessels, celiac axis, and SMA.  99% stenosis with near complete occlusion involving the proximal right common carotid artery and majority of the SMA.  90%  stenosis involving the left proximal common carotid artery.  Mild wall thickening of the ascending colon may be secondary to early changes of ischemia.  No pneumatosis or pneumoperitoneum.  Critical results were communicated to Dr. Maurer on 3/21/2024 at 1211 hours   Dictated by (CST): Silverio Rahman MD on 3/21/2024 at 12:07 PM     Finalized by (CST): Silverio Rahman MD on 3/21/2024 at 12:20 PM          CT ABDOMEN+PELVIS(CONTRAST ONLY)(CPT=74177)    Result Date: 3/21/2024  CONCLUSION:  1. Extensive aortic dissection, which extends from the imaged aortic root throughout descending thoracic and abdominal aorta as well as into the right greater than left common iliac arteries.  Notably, the dissection flap extends into the superior mesenteric artery and this results in severe long segment SMA stenosis.  More distal SMA branches are patent.  However, some of the proximal right SMA branches are not definitively opacified.  There is also suspected long segment ascending colonic wall thickening with mild surrounding inflammatory stranding and abnormal fluid in the proximal colon.  These findings raise suspicion for early ascending colonic ischemia.  Suggest a follow-up/completion CT angiogram of the chest for complete evaluation.  Vascular surgery assessment is also recommended.  Findings were discussed with Dr. Maurer at the time of dictation. 2. No definite pneumatosis intestinalis, portal venous gas, free intraperitoneal air, or well-defined/drainable intra-abdominal collection. 3. Colonic diverticulosis.  Mild rectosigmoid fecal impaction. 4. Slab a below small hepatic cyst. 5. Probable contiguous left adrenal adenomas. 6. Coronary and peripheral atherosclerosis. 7. Mild prostatomegaly. 8. Lesser incidental findings as above.   elm-remote  Dictated by (CST): Saúl Isbell MD on 3/21/2024 at 11:13 AM     Finalized by (CST): Saúl Isbell MD on 3/21/2024 at 11:35 AM           PROCEDURE:  Central line placement:   After verbal  informed consent from patient; with the risks explained to be bleeding, infection, pain, retroperitoneal bleeding and or pneumothorax; maximal sterile barrier technique was uses including cap, gown, sterile gloves, large sheet, handwashing and chlorexidine prep.  The area anesthetized with 1% lidocaine.  The r internal jugular vein was identified on us,  vein was punctured then a wire introducer was placed , a triple lumen catheter was placed using Seldinger technique.  No complications.  Blood return low pressure, dark blood.  Patient tolerated procedure well.   Line placement verified by cxr.    The procedure was performed by myself.        Medical Decision Making  Problems Addressed:  Dissection of thoracoabdominal aorta (HCC): acute illness or injury     Details: Consulted CV surgery and general surgery, CV surgery in ER dw patient and taking patient to OR.  Consulted pulm critical care Dr. Giraldo and Dr. Vidales in ER for admission orders    Amount and/or Complexity of Data Reviewed  Independent Historian: spouse  Labs: ordered. Decision-making details documented in ED Course.  Radiology: ordered and independent interpretation performed. Decision-making details documented in ED Course.     Details: Large aortic dissection extending through abdomen  ECG/medicine tests: ordered and independent interpretation performed. Decision-making details documented in ED Course.  Discussion of management or test interpretation with external provider(s): I spent a total of 55 minutes of critical care time in obtaining history, performing a physical exam, bedside monitoring of interventions, collecting and interpreting tests and discussion with consultants but not including time spent performing procedures.      Risk  Parenteral controlled substances.  Decision regarding hospitalization.            Disposition and Plan     Clinical Impression:  1. Dissection of thoracoabdominal aorta (HCC)    2. Ruptured aneurysm of descending  thoracic aorta (HCC)        Disposition:  Admit    Follow-up:  No follow-up provider specified.    Medications Prescribed:  Current Discharge Medication List          Hospital Problems       Present on Admission             ICD-10-CM Noted POA    * (Principal) Dissection of thoracoabdominal aorta (HCC) I71.03 3/21/2024 Unknown

## 2024-03-21 NOTE — SPIRITUAL CARE NOTE
Spiritual Care Visit Note    Patient Name: Talib Gonzalez Date of Spiritual Care Visit: 24   : 1950 Primary Dx: Dissection of thoracoabdominal aorta (HCC)       Referred By: Referral From: Family    Spiritual Care Taxonomy:    Intended Effects: Demonstrate caring and concern    Methods: Offer emotional support;Offer spiritual/Samaritan support    Interventions: Acknowledge current situation;Active listening;Ask guided questions about fredi;[place];Identify supportive relationship(s);Respond as  to a defined crisis event    Visit Type/Summary:     - Spiritual Care: Responded to a request via the on call phone Consulted with RN prior to visit. Offered empathic listening and emotional support. Provided support for Patient's spiritual/Samaritan requests. Offered prayer.  remains available for follow up.    Spiritual Care support can be requested via an Epic consult. For urgent/immediate needs, please contact the On Call  at: [place]: ext [phone]    Rev. Hamilton Rios MDiv

## 2024-03-21 NOTE — ED QUICK NOTES
Patient on call light, this rn to patient's bedside who wanted to have bowel movement and wanted to try to use bathroom now that his blood pressure is better. Rn attempted to walk patient with walker who stated patient was unable to feel patient's right leg. Er md aware and at bedside to reassess patient.       No pronator drift noted. No facial drop noted.

## 2024-03-21 NOTE — ANESTHESIA PROCEDURE NOTES
Airway  Date/Time: 3/21/2024 1:13 PM  Urgency: Elective    Airway not difficult    General Information and Staff    Patient location during procedure: OR  Anesthesiologist: Cathy Ma MD  Performed: anesthesiologist   Performed by: Cathy Ma MD  Authorized by: Cathy Ma MD      Indications and Patient Condition  Indications for airway management: anesthesia  Spontaneous Ventilation: absent  Sedation level: deep  Preoxygenated: yes  Patient position: sniffing  Mask difficulty assessment: 1 - vent by mask  Planned trial extubation    Final Airway Details  Final airway type: endotracheal airway      Successful airway: ETT  Cuffed: yes   Successful intubation technique: direct laryngoscopy  Facilitating devices/methods: intubating stylet and cricoid pressure  Endotracheal tube insertion site: oral  Blade: Sophy  Blade size: #3  ETT size (mm): 7.5    Cormack-Lehane Classification: grade I - full view of glottis  Placement verified by: capnometry   Measured from: teeth  ETT to teeth (cm): 24  Number of attempts at approach: 1  Number of other approaches attempted: 0    Additional Comments  Intubated easily on first attempt, no dental or soft tissue damage. No signs of aspiration.

## 2024-03-21 NOTE — OR NURSING
Called and spoke to patient wife Rajni at #[phone] at 1410 to give update on start of procedure.

## 2024-03-21 NOTE — CONSULTS
[facility]  part of [facility]    Report of Consultation    Talib Gonzalez Patient Status:  Emergency    1950 MRN [id number]   Location [facility] OPERATING ROOM Attending Murphy Oleary MD   Hosp Day # 0 PCP No primary care provider on file.     Date of Admission:  3/21/2024    Reason for Consultation:   Aortic dissection    History of Present Illness:   Patient is a 73-year-old male with underlying history of anxiety who presents with chief complaint of abdominal discomfort earlier this morning with some associated generalized malaise.  Wife states that patient states he felt somewhat weak earlier today with abdominal pain.  Denies any significant chest pain or back pain.  Arrived to emergency department had CT abdomen pelvis and subsequent CTA done with extensive type a dissection extending from the aortic root to bilateral common iliac arteries.  Mild thickening of the ascending colon seen.  Alert, awake at this time.  Hemodynamically stable.  Denies history of known cardiac disease.    Past Medical History  Past Medical History:   Diagnosis Date    Anxiety        Past Surgical History  History reviewed. No pertinent surgical history.    Family History  No family history on file.    Social History  Social History     Socioeconomic History    Marital status:            Current Medications:  Current Facility-Administered Medications   Medication Dose Route Frequency    sodium chloride 0.9 % IV bolus 1,000 mL  1,000 mL Intravenous Once    norepinephrine (Levophed) 4 mg/250mL infusion premix  0.5-30 mcg/min Intravenous Continuous    insulin regular human (Novolin R, Humulin R) 100 Units in sodium chloride 0.9% 100 mL standard infusion (100 mL)  1-40 Units/hr Intravenous Continuous    [MAR Hold] aminocaproic acid 5 g BOLUS FROM BAG infusion  5 g Intravenous Once    And    [MAR Hold] aminocaproic acid (Amicar) 10 g in sodium chloride 0.9% 250 mL infusion  1 g/hr  Intravenous Once     Facility-Administered Medications Ordered in Other Encounters   Medication Dose Route Frequency    midazolam (Versed) 2 MG/2ML injection   Intravenous PRN     Medications Prior to Admission   Medication Sig    clonazePAM 1 MG Oral Tab 1/2 tablet every morning, afternoon, and night orally 3 times a day for 90 days       Allergies  Allergies   Allergen Reactions    Serotonin Reuptake Inhibitors DIARRHEA       Review of Systems:   Constitutional: denies fevers, chills, weakness, fatigue, recent illness  HEENT: denies headache, sore throat, vision loss  Cardio: denies chest pain, chest pressure, palpitations  Respiratory: denies dyspnea, cough, wheezing, hemoptysis   GI: Abdominal pain  : denies dysuria, hematuria  Musculoskeletal: denies arthralgia, myalgia  Integumentary: denies rash, itching  Neurological: denies syncope, weakness, dizziness,   Psychiatric: denies depression, anxiety  Hematologic: denies bruising        Physical Exam:   Blood pressure (!) 81/44, pulse (!) 46, temperature 97.3 °F (36.3 °C), temperature source Temporal, resp. rate 10, height 6' (1.829 m), weight 217 lb (98.4 kg), SpO2 100%.    Constitutional: no acute distress  Eyes: PERRL  ENT: nares patent  Neck: neck supple, no JVD  Cardio: RRR, S1 S2  Respiratory: clear to auscultation bilaterally, no wheezing, rales, rhonchi, crackles  GI: abdomen soft, non tender, active bowel souds, no organomegaly  Extremities: no clubbing, cyanosis, edema  Neurologic: no gross motor deficits  Skin: warm, dry    Results:   Laboratory Data  Lab Results   Component Value Date    WBC 7.8 03/21/2024    HGB 13.8 03/21/2024    HCT 42.1 03/21/2024    .0 03/21/2024    CREATSERUM 1.03 03/21/2024    BUN 18 03/21/2024     03/21/2024    K 4.6 03/21/2024     03/21/2024    CO2 25.0 03/21/2024     (H) 03/21/2024    CA 9.3 03/21/2024    ALB 3.7 03/30/2023    ALKPHO 74 03/30/2023    TP 7.4 03/30/2023    AST 21 03/30/2023    ALT  26 03/30/2023    PTT 29.4 03/21/2024    INR 1.07 03/21/2024    PTP 14.6 03/21/2024    TSH 2.503 03/21/2024    PSA 4.0 12/05/2017         Imaging  XR CHEST AP PORTABLE  (CPT=71045)    Result Date: 3/21/2024  PROCEDURE: XR CHEST AP PORTABLE  (CPT=71045) TIME: 1233  COMPARISON: None.  INDICATIONS: Central Line Placement  TECHNIQUE:   Single view.   Findings and impression:  Right IJ catheter in the mid SVC  Normal heart size with no edema  Lungs are clear  Normal pleura  No free air     Dictated by (CST): Sebastian Alicea MD on 3/21/2024 at 12:52 PM     Finalized by (CST): Sebastian Alicea MD on 3/21/2024 at 12:54 PM          CTA CHEST+CTA ABDOMEN DISSECT SET (CPT=71275/84350)    Result Date: 3/21/2024  CONCLUSION:  Massive type A dissection extending from the aortic root to the bilateral common iliac arteries.  The dissection extends into the aortic arch great vessels, celiac axis, and SMA.  99% stenosis with near complete occlusion involving the proximal right common carotid artery and majority of the SMA.  90% stenosis involving the left proximal common carotid artery.  Mild wall thickening of the ascending colon may be secondary to early changes of ischemia.  No pneumatosis or pneumoperitoneum.  Critical results were communicated to Dr. Maurer on 3/21/2024 at 1211 hours   Dictated by (CST): Silverio Rahman MD on 3/21/2024 at 12:07 PM     Finalized by (CST): Silverio Rahman MD on 3/21/2024 at 12:20 PM          CT ABDOMEN+PELVIS(CONTRAST ONLY)(CPT=74177)    Result Date: 3/21/2024  CONCLUSION:  1. Extensive aortic dissection, which extends from the imaged aortic root throughout descending thoracic and abdominal aorta as well as into the right greater than left common iliac arteries.  Notably, the dissection flap extends into the superior mesenteric artery and this results in severe long segment SMA stenosis.  More distal SMA branches are patent.  However, some of the proximal right SMA branches are not definitively opacified.   There is also suspected long segment ascending colonic wall thickening with mild surrounding inflammatory stranding and abnormal fluid in the proximal colon.  These findings raise suspicion for early ascending colonic ischemia.  Suggest a follow-up/completion CT angiogram of the chest for complete evaluation.  Vascular surgery assessment is also recommended.  Findings were discussed with Dr. Maurer at the time of dictation. 2. No definite pneumatosis intestinalis, portal venous gas, free intraperitoneal air, or well-defined/drainable intra-abdominal collection. 3. Colonic diverticulosis.  Mild rectosigmoid fecal impaction. 4. Slab a below small hepatic cyst. 5. Probable contiguous left adrenal adenomas. 6. Coronary and peripheral atherosclerosis. 7. Mild prostatomegaly. 8. Lesser incidental findings as above.   elm-remote  Dictated by (CST): Saúl Isbell MD on 3/21/2024 at 11:13 AM     Finalized by (CST): Saúl Isbell MD on 3/21/2024 at 11:35 AM           Assessment   1.  Type A aortic dissection  2.  Abdominal pain  3.  Carotid stenosis      Plan   -Patient presents with evidence of sudden onset abdominal discomfort with some generalized malaise earlier this morning.  No significant chest pain or back pain per patient.  -CT abdomen pelvis and subsequent CT chest abdomen pelvis with evidence of extensive type B dissection extending from aortic root to bilateral common iliac arteries.  Mild wall thickening of the ascending colon secondary to changes likely secondary to early ischemia.  -Awaiting formal CV surgery consultation.  Will require emergent surgical intervention for repair.  Type and screen.  -Close hemodynamic monitoring at this time.  Hemodynamically stable at this time.  -Will also likely require general surgery evaluation given presentation.  -Discussed with family  -Reviewed vitals, labs and imaging.    Ruddy Rubi DO  Pulmonary Critical Care Medicine  [facility]  3/21/2024  1:07 PM

## 2024-03-21 NOTE — H&P
[facility]    PATIENT'S NAME: MADELINE MAGAÑA   ATTENDING PHYSICIAN: Murphy Oleary MD   PATIENT ACCOUNT#:   [id number]    LOCATION:  [place] [address]  MEDICAL RECORD #:   [id number]       YOB: 1950  ADMISSION DATE:       03/21/2024    HISTORY AND PHYSICAL EXAMINATION    REASON FOR ADMISSION:  Massive aortic dissection.    HISTORY OF PRESENT ILLNESS:  The patient is a 73-year-old  male who presented to the emergency department for evaluation of lower abdominal cramps, frequent bowel movements, and right upper and lower extremity tingling and numbness that started rapidly earlier this morning.  In the emergency room, CBC and chemistry were unremarkable.  EKG noted to be in sinus rhythm.  Blood pressure upon arrival was 63/37.  Sinus bradycardia noted on monitor.  CT scan of the abdomen initially showed extensive aortic dissection, extends from the imaged aortic roots throughout the descending thoracic and abdominal aorta as well as into the right greater than left common iliac arteries.  There is a flap occluding subtotally the proximal SMA.  CT scan of the chest, abdomen, and pelvis, angiogram, showed massive type A dissection extending from the aortic root to the bilateral common aortoiliac arteries, right more than left.  Dissection extends into the aortic arc, great vessels, bilateral carotid artery, celiac axis, and superior mesenteric artery; 90% stenosis, near complete occlusion, involving the right common carotid artery and majority of the superior mesenteric artery; 90% involving left proximal common carotid artery; mild wall thickening of the ascending colon, maybe secondary to early changes of ischemia.  The patient was evaluated by Cardiovascular Surgery and he will be taken urgently to the operating room.    PAST MEDICAL HISTORY:  Anxiety.  Patient denies any other medical problems.    PAST SURGICAL HISTORY:  Achilles tendon repair.      ALLERGIES:   No known drug allergies.    SOCIAL HISTORY:  Extensive tobacco use history.  Social alcohol.  No drug use.    FAMILY HISTORY:  Both parents  of old age.  The patient is not able to give me further details.    REVIEW OF SYSTEMS:  Currently complaining of pain in mid abdomen, lower abdomen, and pelvis; tingling and numbness in right upper and lower extremities.  Earlier today he had frequent fecal urgency.  Other 12-point review of systems negative.          PHYSICAL EXAMINATION:    GENERAL:  Patient appears pale.  Moderate distress.    VITAL SIGNS:  Temperature 97.3, pulse 66, respiratory rate 20, blood pressure 88/77, pulse ox 93% on room air.  HEENT:  Atraumatic.  Oropharynx clear.  Moist mucous membranes.  Normal hard and soft palate.  Eyes:  Anicteric sclerae.  NECK:  Supple.  LUNGS:  Clear to auscultation bilaterally.  HEART:  Regular rate and rhythm.  S1 and S2 auscultated.  Bradycardic.  ABDOMEN:  Soft, nondistended.  No tenderness.  EXTREMITIES:  No edema, clubbing, or cyanosis.  NEUROLOGIC:  Motor and sensory intact.    ASSESSMENT AND PLAN:    1.   Severe type A aortic dissection involving all major vessels, extending from the aortic root down thoracic and abdominal aorta, bilateral common iliac arteries, bilateral carotid arteries, superior mesenteric artery.    2.   Hemodynamic instability with hypotension and bradycardia.  3.   Possible early bowel ischemia secondary to superior mesenteric artery occlusion from a dissection flap.  4.   Right upper and lower extremity tingling and numbness related to possible flap occlusion of the carotid artery.    The patient will be taken to the OR by Cardiovascular Surgery.  Further recommendations to follow.  I discussed the case with his wife at the bedside and explained the poor prognosis.  Further recommendations to follow.     Dictated By Lona Vidales MD  d: 2024 12:36:58  t: 2024 13:05:26  Job [id number]  FB/    cc: Murphy Oleary,  MD

## 2024-03-21 NOTE — ED QUICK NOTES
Late entry  ERMD at bedside who updated patient and family regarding patient's CT results. ERMD at bedside for central line insertion. Awaiting surgery arrival.  currently at bedside with wife.   Patient continues to be on NIBP Cardiac and SPO2 monitor. Cardiac pads placed, crash cart at bedside.

## 2024-03-21 NOTE — ED INITIAL ASSESSMENT (HPI)
Patient to ed via ems reported was feeling dizzy and anxious at home when patient reported taking propanolol in attempt to make patient feel better, 911 was called due to patient feeling worse. Per ems patient's hr was in low 40s and sbp 80s, gave 0.5mg atropine iv and vitals improved.

## 2024-03-22 ENCOUNTER — APPOINTMENT (OUTPATIENT)
Dept: GENERAL RADIOLOGY | Facility: HOSPITAL | Age: 74
End: 2024-03-22
Attending: CLINICAL NURSE SPECIALIST
Payer: MEDICARE

## 2024-03-22 ENCOUNTER — APPOINTMENT (OUTPATIENT)
Dept: GENERAL RADIOLOGY | Facility: HOSPITAL | Age: 74
DRG: 219 | End: 2024-03-22
Attending: CLINICAL NURSE SPECIALIST
Payer: MEDICARE

## 2024-03-22 LAB
ANION GAP SERPL CALC-SCNC: 3 MMOL/L (ref 0–18)
ANION GAP SERPL CALC-SCNC: 6 MMOL/L (ref 0–18)
ATRIAL RATE: 73 BPM
ATRIAL RATE: 73 BPM
ATRIAL RATE: 77 BPM
BASOPHILS # BLD AUTO: 0.01 X10(3) UL (ref 0–0.2)
BASOPHILS NFR BLD AUTO: 0.1 %
BLOOD TYPE BARCODE: 6200
BLOOD TYPE BARCODE: 7300
BUN BLD-MCNC: 13 MG/DL (ref 9–23)
BUN BLD-MCNC: 18 MG/DL (ref 9–23)
BUN/CREAT SERPL: 18.8 (ref 10–20)
BUN/CREAT SERPL: 20.5 (ref 10–20)
CALCIUM BLD-MCNC: 7.4 MG/DL (ref 8.7–10.4)
CALCIUM BLD-MCNC: 7.7 MG/DL (ref 8.7–10.4)
CHLORIDE SERPL-SCNC: 112 MMOL/L (ref 98–112)
CHLORIDE SERPL-SCNC: 113 MMOL/L (ref 98–112)
CO2 SERPL-SCNC: 23 MMOL/L (ref 21–32)
CO2 SERPL-SCNC: 25 MMOL/L (ref 21–32)
CREAT BLD-MCNC: 0.69 MG/DL
CREAT BLD-MCNC: 0.88 MG/DL
DEPRECATED RDW RBC AUTO: 48.4 FL (ref 35.1–46.3)
EGFRCR SERPLBLD CKD-EPI 2021: 91 ML/MIN/1.73M2 (ref 60–?)
EGFRCR SERPLBLD CKD-EPI 2021: 98 ML/MIN/1.73M2 (ref 60–?)
EOSINOPHIL # BLD AUTO: 0.01 X10(3) UL (ref 0–0.7)
EOSINOPHIL NFR BLD AUTO: 0.1 %
ERYTHROCYTE [DISTWIDTH] IN BLOOD BY AUTOMATED COUNT: 14.2 % (ref 11–15)
GLUCOSE BLD-MCNC: 115 MG/DL (ref 70–99)
GLUCOSE BLD-MCNC: 141 MG/DL (ref 70–99)
GLUCOSE BLDC GLUCOMTR-MCNC: 103 MG/DL (ref 70–99)
GLUCOSE BLDC GLUCOMTR-MCNC: 104 MG/DL (ref 70–99)
GLUCOSE BLDC GLUCOMTR-MCNC: 109 MG/DL (ref 70–99)
GLUCOSE BLDC GLUCOMTR-MCNC: 117 MG/DL (ref 70–99)
GLUCOSE BLDC GLUCOMTR-MCNC: 120 MG/DL (ref 70–99)
GLUCOSE BLDC GLUCOMTR-MCNC: 122 MG/DL (ref 70–99)
GLUCOSE BLDC GLUCOMTR-MCNC: 122 MG/DL (ref 70–99)
GLUCOSE BLDC GLUCOMTR-MCNC: 126 MG/DL (ref 70–99)
GLUCOSE BLDC GLUCOMTR-MCNC: 127 MG/DL (ref 70–99)
GLUCOSE BLDC GLUCOMTR-MCNC: 128 MG/DL (ref 70–99)
GLUCOSE BLDC GLUCOMTR-MCNC: 130 MG/DL (ref 70–99)
GLUCOSE BLDC GLUCOMTR-MCNC: 131 MG/DL (ref 70–99)
GLUCOSE BLDC GLUCOMTR-MCNC: 133 MG/DL (ref 70–99)
GLUCOSE BLDC GLUCOMTR-MCNC: 133 MG/DL (ref 70–99)
GLUCOSE BLDC GLUCOMTR-MCNC: 136 MG/DL (ref 70–99)
GLUCOSE BLDC GLUCOMTR-MCNC: 137 MG/DL (ref 70–99)
GLUCOSE BLDC GLUCOMTR-MCNC: 138 MG/DL (ref 70–99)
GLUCOSE BLDC GLUCOMTR-MCNC: 142 MG/DL (ref 70–99)
GLUCOSE BLDC GLUCOMTR-MCNC: 146 MG/DL (ref 70–99)
GLUCOSE BLDC GLUCOMTR-MCNC: 147 MG/DL (ref 70–99)
GLUCOSE BLDC GLUCOMTR-MCNC: 148 MG/DL (ref 70–99)
GLUCOSE BLDC GLUCOMTR-MCNC: 153 MG/DL (ref 70–99)
GLUCOSE BLDC GLUCOMTR-MCNC: 157 MG/DL (ref 70–99)
GLUCOSE BLDC GLUCOMTR-MCNC: 161 MG/DL (ref 70–99)
GLUCOSE BLDC GLUCOMTR-MCNC: 179 MG/DL (ref 70–99)
GLUCOSE BLDC GLUCOMTR-MCNC: 184 MG/DL (ref 70–99)
GLUCOSE BLDC GLUCOMTR-MCNC: 95 MG/DL (ref 70–99)
HCT VFR BLD AUTO: 33.3 %
HGB BLD-MCNC: 11.3 G/DL
IMM GRANULOCYTES # BLD AUTO: 0.04 X10(3) UL (ref 0–1)
IMM GRANULOCYTES NFR BLD: 0.4 %
LYMPHOCYTES # BLD AUTO: 0.39 X10(3) UL (ref 1–4)
LYMPHOCYTES NFR BLD AUTO: 3.9 %
MAGNESIUM SERPL-MCNC: 1.9 MG/DL (ref 1.6–2.6)
MAGNESIUM SERPL-MCNC: 1.9 MG/DL (ref 1.6–2.6)
MCH RBC QN AUTO: 31.6 PG (ref 26–34)
MCHC RBC AUTO-ENTMCNC: 33.9 G/DL (ref 31–37)
MCV RBC AUTO: 93 FL
MONOCYTES # BLD AUTO: 0.4 X10(3) UL (ref 0.1–1)
MONOCYTES NFR BLD AUTO: 4 %
NEUTROPHILS # BLD AUTO: 9.05 X10 (3) UL (ref 1.5–7.7)
NEUTROPHILS # BLD AUTO: 9.05 X10(3) UL (ref 1.5–7.7)
NEUTROPHILS NFR BLD AUTO: 91.5 %
OSMOLALITY SERPL CALC.SUM OF ELEC: 287 MOSM/KG (ref 275–295)
OSMOLALITY SERPL CALC.SUM OF ELEC: 302 MOSM/KG (ref 275–295)
P AXIS: 71 DEGREES
P AXIS: 96 DEGREES
P-R INTERVAL: 274 MS
P-R INTERVAL: 314 MS
P-R INTERVAL: 384 MS
PLATELET # BLD AUTO: 138 10(3)UL (ref 150–450)
PLATELETS.RETICULATED NFR BLD AUTO: 4.1 % (ref 0–7)
POTASSIUM SERPL-SCNC: 3.8 MMOL/L (ref 3.5–5.1)
POTASSIUM SERPL-SCNC: 4.2 MMOL/L (ref 3.5–5.1)
POTASSIUM SERPL-SCNC: 5.7 MMOL/L (ref 3.5–5.1)
Q-T INTERVAL: 430 MS
Q-T INTERVAL: 482 MS
Q-T INTERVAL: 500 MS
QRS DURATION: 134 MS
QRS DURATION: 144 MS
QRS DURATION: 144 MS
QTC CALCULATION (BEZET): 473 MS
QTC CALCULATION (BEZET): 531 MS
QTC CALCULATION (BEZET): 565 MS
R AXIS: 49 DEGREES
R AXIS: 58 DEGREES
R AXIS: 61 DEGREES
RBC # BLD AUTO: 3.58 X10(6)UL
SODIUM SERPL-SCNC: 138 MMOL/L (ref 136–145)
SODIUM SERPL-SCNC: 144 MMOL/L (ref 136–145)
T AXIS: 34 DEGREES
T AXIS: 39 DEGREES
T AXIS: 45 DEGREES
UNIT VOLUME: 104 ML
UNIT VOLUME: 275 ML
UNIT VOLUME: 350 ML
VENTRICULAR RATE: 73 BPM
VENTRICULAR RATE: 73 BPM
VENTRICULAR RATE: 77 BPM
WBC # BLD AUTO: 9.9 X10(3) UL (ref 4–11)

## 2024-03-22 PROCEDURE — 99233 SBSQ HOSP IP/OBS HIGH 50: CPT | Performed by: INTERNAL MEDICINE

## 2024-03-22 PROCEDURE — 99233 SBSQ HOSP IP/OBS HIGH 50: CPT | Performed by: HOSPITALIST

## 2024-03-22 PROCEDURE — 71045 X-RAY EXAM CHEST 1 VIEW: CPT | Performed by: CLINICAL NURSE SPECIALIST

## 2024-03-22 RX ORDER — FOLIC ACID 1 MG/1
1 TABLET ORAL DAILY
COMMUNITY

## 2024-03-22 RX ORDER — PROPRANOLOL HYDROCHLORIDE 10 MG/1
10 TABLET ORAL 2 TIMES DAILY PRN
COMMUNITY
Start: 2023-10-09 | End: 2024-03-28

## 2024-03-22 RX ORDER — CALCIUM GLUCONATE 10 MG/ML
1 INJECTION, SOLUTION INTRAVENOUS ONCE
Status: COMPLETED | OUTPATIENT
Start: 2024-03-22 | End: 2024-03-22

## 2024-03-22 RX ORDER — ASPIRIN 325 MG
325 TABLET, DELAYED RELEASE (ENTERIC COATED) ORAL DAILY
COMMUNITY
End: 2024-03-28

## 2024-03-22 NOTE — ANESTHESIA PROCEDURE NOTES
Procedure Performed: [place]       Start Time:  3/21/2024 1:40 PM       End Time:   3/21/2024 7:20 PM    Preanesthesia Checklist:  Patient identified, IV assessed, risks and benefits discussed, monitors and equipment assessed, procedure being performed at surgeon's request and anesthesia consent obtained.    General Procedure Information  Diagnostic Indications for Echo:  assessment of ascending aorta, assessment of surgical repair and hemodynamic monitoring  Physician Requesting Echo: Peter Taveras MD  Location performed:  OR  Intubated  Bite block placed  Heart visualized  Probe Insertion:  Easy  Probe Type:  Multiplane  Modalities:  Pulse wave Doppler    Echocardiographic and Doppler Measurements    Ventricles    Right Ventricle:  Cavity size normal.  Hypertrophy not present.  Thrombus not present.  Global function normal.    Left Ventricle:  Cavity size normal.  Hypertrophy present.  Thrombus not present.  Global Function normal.          Valves    Aortic Valve:  Annulus normal.  Stenosis not present.  Regurgitation absent.  Leaflets normal.  Leaflet motions normal.      Mitral Valve:  Annulus normal.  Stenosis not present.  Regurgitation absent.  Leaflets normal.  Leaflet motions normal.      Tricuspid Valve:  Annulus normal.        Aorta    Ascending Aorta:  Size aneurysmal.  Dissection present.  Mobile plaque not present.    Aortic Arch:  Size dilated.  Dissection present.  Mobile plaque not present.    Descending Aorta:  Size dilated.  Dissection present.        Atria    Right Atrium:  Size normal.  Spontaneous echo contrast not present.  Thrombus not present.  Tumor not present.    Left Atrium:  Size normal.  Spontaneous echo contrast not present.  Thrombus not present.  Tumor not present.      Septa    Atrial Septum:  Intra-atrial septal morphology normal.      Ventricular Septum:  Intra-ventricular septum morphology hypertrophy.          Other Findings  Pericardium:  normal      Anesthesia  Information  Performed Personally  Anesthesiologist:  Magan Lam MD

## 2024-03-22 NOTE — PROGRESS NOTES
On call Nocturnist 03/21/24    Patient arrives from OR s/p emergent repair of aortic dissection.  Patient is intubated, sedated, art line in, r internal jugular line with temp pacing in place though currently in NSR rate of 70.  His sbp 107 on art line reading.  His lungs are symmetric and clear bilateral.  Had CXR done at 8:20 with min edema ET tube in good position.  Heart rate in 70's with 2+ b radial and dorsalis pedal pulses.  ABG reassuring no change in vent settings.   Recent Labs   Lab 03/21/24 2011   ABGPHT 7.45   [id number] 41   CCPLX9Z 171*   ABGHCO3 28.1*   THGB 9.5*     Goals for now keep sedated, intubated and maintain HR and blood pressure control.  Monitor for bleeding.  Family brought to bedside.  Available for any acute change in status.

## 2024-03-22 NOTE — PROGRESS NOTES
[facility]  part of [facility]     Progress Note    Talib Gonzalez Patient Status:  Inpatient    1950 MRN [id number]   Location [facility] 2W/SW Attending Keya Workman,    Hosp Day # 1 PCP No primary care provider on file.       Subjective:   Seen and examined.  Intubated, sedated.  Underwent emergent repair of type A aortic dissection yesterday.    Objective:   Blood pressure 107/62, pulse 65, temperature 98.8 °F (37.1 °C), temperature source Pulmonary Artery, resp. rate 12, height 6' (1.829 m), weight 216 lb 14.9 oz (98.4 kg), SpO2 100%.  Intake/Output:   Last 3 shifts: I/O last 3 completed shifts:  In: 67928.7 [I.V.:4662.7; Blood:5400; IV PIGGYBACK:510]  Out: 2858 [Urine:2260; Emesis/NG output:250; Chest Tube:348]   This shift: I/O this shift:  In: 949.2 [I.V.:729.2; IV PIGGYBACK:220]  Out: 457 [Urine:295; Chest Tube:162]     Vent Settings: Vent Mode: VC/AC  FiO2 (%):  [35 %-50 %] 35 %  S RR:  [10-12] 12  S VT:  [600 mL-800 mL] 600 mL  PEEP/CPAP (cm H2O):  [5 cm H20] 5 cm H20  MAP (cm H2O):  [7.7-8.5] 7.7    Hemodynamic parameters (last 24 hours): PAP: (15-33)/(-15-17) 24/3  CO:  [4.5 L/min-6.8 L/min] 4.5 L/min  CI:  [2 L/min/m2-3.1 L/min/m2] 2 L/min/m2    Scheduled Meds:   Current Facility-Administered Medications   Medication Dose Route Frequency    norepinephrine (Levophed) 4 mg/250mL infusion premix  0.5-30 mcg/min Intravenous Continuous    sodium chloride 0.9% infusion  83 mL/hr Intravenous Continuous    dextrose 5%-sodium chloride 0.9% infusion   mL/hr Intravenous Continuous    melatonin tab 3 mg  3 mg Oral Nightly PRN    polyethylene glycol (PEG 3350) (Miralax) 17 g oral packet 17 g  17 g Oral Daily PRN    sennosides (Senokot) tab 17.2 mg  17.2 mg Oral Nightly PRN    bisacodyl (Dulcolax) 10 MG rectal suppository 10 mg  10 mg Rectal Daily PRN    fleet enema (Fleet) 7-19 GM/118ML rectal enema 133 mL  1 enema Rectal Once PRN    ondansetron (Zofran) 4 MG/2ML  injection 4 mg  4 mg Intravenous Q6H PRN    metoclopramide (Reglan) 5 mg/mL injection 10 mg  10 mg Intravenous Q6H    famotidine (Pepcid) tab 20 mg  20 mg Oral BID    Or    famotidine (Pepcid) 20 mg/2mL injection 20 mg  20 mg Intravenous BID    albumin human (Albumin) 5% injection 12.5 g  12.5 g Intravenous Once PRN    DOBUTamine in dextrose 5% (Dobutrex) 500 mg/250mL infusion premix  2.5-20 mcg/kg/min Intravenous Continuous PRN    nitroGLYCERIN in dextrose 5% 50 mg/250mL infusion premix  5-300 mcg/min Intravenous Continuous PRN    norepinephrine (Levophed) 4 mg/250mL infusion premix  0.5-30 mcg/min Intravenous Continuous PRN    metoprolol tartrate (Lopressor) tab 25 mg  25 mg Oral 2x Daily(Beta Blocker)    milrinone in dextrose 5% (Primacor) 20 mg/100mL infusion premix  0.125-0.25 mcg/kg/min Intravenous PRN    potassium chloride 20 mEq/100mL IVPB premix 20 mEq  20 mEq Intravenous PRN    Or    potassium chloride 40 mEq/100mL IVPB premix (central line) 40 mEq  40 mEq Intravenous PRN    magnesium sulfate in dextrose 5% 1 g/100mL infusion premix 1 g  1 g Intravenous PRN    magnesium sulfate in sterile water for injection 2 g/50mL IVPB premix 2 g  2 g Intravenous PRN    mupirocin (Bactroban) 2% nasal ointment 1 Application  1 Application Nasal BID    chlorhexidine gluconate (Peridex) 0.12 % oral solution 15 mL  15 mL Mouth/Throat BID    multivitamin (Tab-A-Juanita/Beta Carotene) tab 1 tablet  1 tablet Oral Daily    ascorbic acid (Vitamin C) tab 500 mg  500 mg Oral TID    enoxaparin (Lovenox) 40 MG/0.4ML SUBQ injection 40 mg  40 mg Subcutaneous Daily    acetaminophen (Tylenol) tab 650 mg  650 mg Oral Q4H PRN    Or    HYDROcodone-acetaminophen (Norco) 5-325 MG per tab 1 tablet  1 tablet Oral Q4H PRN    Or    HYDROcodone-acetaminophen (Norco) 5-325 MG per tab 2 tablet  2 tablet Oral Q4H PRN    morphINE PF 2 MG/ML injection 2 mg  2 mg Intravenous Q2H PRN    Or    morphINE PF 4 MG/ML injection 4 mg  4 mg Intravenous Q2H PRN     Or    morphINE PF 4 MG/ML injection 8 mg  8 mg Intravenous Q2H PRN    dexmedeTOMIDine in sodium chloride 0.9% (Precedex) 400 mcg/100mL infusion premix  0.2-1.5 mcg/kg/hr Intravenous Continuous    aspirin DR tab 81 mg  81 mg Oral Daily    ceFAZolin (Ancef) 2 g in 20mL IV syringe premix  2 g Intravenous Q8H    sennosides (Senokot) tab 8.6 mg  8.6 mg Oral BID    docusate sodium (Colace) cap 100 mg  100 mg Oral BID    glucose (Dex4) 15 GM/59ML oral liquid 15 g  15 g Oral Q15 Min PRN    Or    glucose (Glutose) 40% oral gel 15 g  15 g Oral Q15 Min PRN    Or    glucose-vitamin C (Dex-4) chewable tab 4 tablet  4 tablet Oral Q15 Min PRN    Or    dextrose 50% injection 50 mL  50 mL Intravenous Q15 Min PRN    Or    glucose (Dex4) 15 GM/59ML oral liquid 30 g  30 g Oral Q15 Min PRN    Or    glucose (Glutose) 40% oral gel 30 g  30 g Oral Q15 Min PRN    Or    glucose-vitamin C (Dex-4) chewable tab 8 tablet  8 tablet Oral Q15 Min PRN    insulin regular human (Novolin R, Humulin R) 100 Units in sodium chloride 0.9% 100 mL standard infusion (100 mL)  1-28 Units/hr Intravenous Continuous    clevidipine (Cleviprex) 25 MG/50ML IV infusion  1-6 mg/hr Intravenous Continuous    fentaNYL (Sublimaze) 50 mcg/mL injection 25 mcg  25 mcg Intravenous Q30 Min PRN    Or    fentaNYL (Sublimaze) 50 mcg/mL injection 50 mcg  50 mcg Intravenous Q30 Min PRN    acetaminophen (Tylenol) rectal suppository 650 mg  650 mg Rectal Q4H PRN    ketorolac (Toradol) 30 MG/ML injection 15 mg  15 mg Intravenous Q6H PRN    fentaNYL (Sublimaze) 25 mcg BOLUS FROM BAG infusion  25 mcg Intravenous Q30 Min PRN    Or    fentaNYL (Sublimaze) 50 mcg BOLUS FROM BAG infusion  50 mcg Intravenous Q30 Min PRN    fentaNYL in sodium chloride 0.9% (Sublimaze) 1000 mcg/100mL infusion premix   mcg/hr Intravenous Continuous PRN    propofol (Diprivan) 10 mg/mL infusion premix  5-50 mcg/kg/min Intravenous Continuous       Continuous Infusions:    norepinephrine Stopped (03/21/24  1356)    sodium chloride 20 mL/hr (03/22/24 1300)    dextrose 5%-sodium chloride 0.9% 40 mL/hr (03/22/24 1300)    DOBUTamine 0.5 mcg/kg/min (03/22/24 1300)    nitroGLYCERIN in dextrose 5% 15 mcg/min (03/22/24 1300)    norepinephrine      dexmedetomidine 0.8 mcg/kg/hr (03/22/24 1300)    insulin regular 2 Units/hr (03/22/24 1330)    clevidipine 4 mg/hr (03/22/24 1300)    fentanyl      propofol 35 mcg/kg/min (03/22/24 1300)       Physical Exam  Constitutional: no acute distress, intubated, sedated  Eyes: PERRL  ENT: nares pateint  Neck: supple, no JVD  Cardio: RRR, S1 S2  Respiratory: Basilar crackles  GI: abdomen soft, non tender, active bowel sounds, no organomegaly  Extremities: no clubbing, cyanosis, edema  Neurologic: no gross motor deficits  Skin: warm, dry      Results:     Lab Results   Component Value Date    WBC 9.9 03/22/2024    HGB 11.3 03/22/2024    HCT 33.3 03/22/2024    .0 03/22/2024    CREATSERUM 0.88 03/22/2024    BUN 18 03/22/2024     03/22/2024    K 3.8 03/22/2024     03/22/2024    CO2 25.0 03/22/2024     03/22/2024    CA 7.7 03/22/2024    PTT 37.3 03/21/2024    INR 1.50 03/21/2024    DDIMER >20.00 03/21/2024    MG 1.9 03/22/2024       XR CHEST AP PORTABLE  (CPT=71045)    Result Date: 3/22/2024  PROCEDURE: XR CHEST AP PORTABLE  (CPT=71045) TIME: 642  COMPARISON: [facility], XR CHEST AP PORTABLE (CPT=71045), 3/21/2024, 12:33 PM.  [facility], CTA CHEST+CTA ABDOMEN DISSECT SET (CPT=71275/80926), 3/21/2024, 11:44 AM.  [facility], XR CHEST AP PORTABLE (CPT=71045), 3/21/2024, 8:25 PM.  INDICATIONS: Follow up open heart surgery.  TECHNIQUE:   Single view.   Findings and impression:  ETT 5.5 cm above the christiano enteric tube beneath the diaphragm  PA catheter is in the right ventricle, similar to prior  Mediastinal and pericardial drain in place.   Normal heart size.  Diffuse widening of the upper mediastinum is unchanged from immediate  postoperative image, not present on preoperative imaging.  This may represent hematoma  There is no pulmonary edema  No pneumothorax or large effusion  Minimal atelectasis at the bases      Dictated by (CST): Sebastian Alicea MD on 3/22/2024 at 8:21 AM     Finalized by (CST): Sebastian Alicea MD on 3/22/2024 at 8:27 AM          XR CHEST AP PORTABLE  (CPT=71045)    Result Date: 3/21/2024  CONCLUSION:   Postoperative changes involving the thoracic aorta with multiple support devices, which are described in detail above.  Mild pulmonary venous congestion.  No focal opacity, pleural effusion, or pneumothorax.   Dictated by (CST): William Brown MD on 3/21/2024 at 9:00 PM     Finalized by (CST): William Brown MD on 3/21/2024 at 9:05 PM          XR CHEST AP PORTABLE  (CPT=71045)    Result Date: 3/21/2024  PROCEDURE: XR CHEST AP PORTABLE  (CPT=71045) TIME: 1233  COMPARISON: None.  INDICATIONS: Central Line Placement  TECHNIQUE:   Single view.   Findings and impression:  Right IJ catheter in the mid SVC  Normal heart size with no edema  Lungs are clear  Normal pleura  No free air     Dictated by (CST): Sebastian Alicea MD on 3/21/2024 at 12:52 PM     Finalized by (CST): Sebastian Alicea MD on 3/21/2024 at 12:54 PM          CTA CHEST+CTA ABDOMEN DISSECT SET (CPT=71275/28472)    Result Date: 3/21/2024  CONCLUSION:  Massive type A dissection extending from the aortic root to the bilateral common iliac arteries.  The dissection extends into the aortic arch great vessels, celiac axis, and SMA.  99% stenosis with near complete occlusion involving the proximal right common carotid artery and majority of the SMA.  90% stenosis involving the left proximal common carotid artery.  Mild wall thickening of the ascending colon may be secondary to early changes of ischemia.  No pneumatosis or pneumoperitoneum.  Critical results were communicated to Dr. Maurer on 3/21/2024 at 1211 hours   Dictated by (CST): Silverio Rahman MD on 3/21/2024 at 12:07 PM      Finalized by (CST): Silverio Rahman MD on 3/21/2024 at 12:20 PM          CT ABDOMEN+PELVIS(CONTRAST ONLY)(CPT=74177)    Result Date: 3/21/2024  CONCLUSION:  1. Extensive aortic dissection, which extends from the imaged aortic root throughout descending thoracic and abdominal aorta as well as into the right greater than left common iliac arteries.  Notably, the dissection flap extends into the superior mesenteric artery and this results in severe long segment SMA stenosis.  More distal SMA branches are patent.  However, some of the proximal right SMA branches are not definitively opacified.  There is also suspected long segment ascending colonic wall thickening with mild surrounding inflammatory stranding and abnormal fluid in the proximal colon.  These findings raise suspicion for early ascending colonic ischemia.  Suggest a follow-up/completion CT angiogram of the chest for complete evaluation.  Vascular surgery assessment is also recommended.  Findings were discussed with Dr. Maurer at the time of dictation. 2. No definite pneumatosis intestinalis, portal venous gas, free intraperitoneal air, or well-defined/drainable intra-abdominal collection. 3. Colonic diverticulosis.  Mild rectosigmoid fecal impaction. 4. Slab a below small hepatic cyst. 5. Probable contiguous left adrenal adenomas. 6. Coronary and peripheral atherosclerosis. 7. Mild prostatomegaly. 8. Lesser incidental findings as above.   elm-remote  Dictated by (CST): Saúl Isbell MD on 3/21/2024 at 11:13 AM     Finalized by (CST): Saúl Isbell MD on 3/21/2024 at 11:35 AM           EKG 12 Lead    Result Date: 3/22/2024  Sinus rhythm with 1st degree A-V block Right bundle branch block Abnormal ECG When compared with ECG of 21-MAR-2024 20:11, No significant change was found Confirmed by DINESH LOPEZ, WARD ([id number]) on 3/22/2024 8:02:23 AM    EKG 12 Lead    Result Date: 3/22/2024  Sinus rhythm with marked sinus arrythmia with 1st degree A-V block with  occasional ventricular-paced complexes Right bundle branch block Abnormal ECG When compared with ECG of 21-MAR-2024 23:33, Electronic ventricular pacemaker has replaced Sinus rhythm Confirmed by DINESH LOPEZ JORDAN ([id number]) on 3/22/2024 8:01:57 AM    EKG 12 Lead    Result Date: 3/22/2024  Sinus rhythm with 1st degree A-V block Right bundle branch block Abnormal ECG When compared with ECG of 21-MAR-2024 20:14, No significant change was found Confirmed by DINESH LOPEZ JORDAN ([id number]) on 3/22/2024 8:00:45 AM    EKG 12 Lead    Result Date: 3/21/2024  Marked sinus bradycardia with marked sinus arrythmia with 1st degree A-V block Right bundle branch block Abnormal ECG When compared with ECG of 21-MAR-2024 09:02, No significant change was found Confirmed by DINESH LOPEZ JORDAN ([id number]) on 3/21/2024 4:40:59 PM    EKG 12 Lead    Result Date: 3/21/2024  Sinus bradycardia with 1st degree A-V block Right bundle branch block Abnormal ECG No previous ECGs found in [facility] Confirmed by DINESH LOPEZ JORDAN ([id number]) on 3/21/2024 10:30:00 AM     Assessment   1.  Type A aortic dissection POD #1 status post repair  2.  Acute respiratory failure  3.  Carotid stenosis  4.  Anemia  5.  Thrombocytopenia     Plan   -Patient presents with evidence of sudden onset abdominal discomfort with some generalized malaise earlier this morning.  No significant chest pain or back pain per patient.  -CT abdomen pelvis and subsequent CT chest abdomen pelvis with evidence of extensive type B dissection extending from aortic root to bilateral common iliac arteries.  Mild wall thickening of the ascending colon secondary to changes likely secondary to early ischemia.  -Status post emergent repair of type A dissection on 3/21/2024.  -Spontaneous breathing trial as tolerated although not clearly following commands.  -Wean nitroglycerin and Cleviprex as tolerated  -Close hemodynamic monitoring  -Closely monitor hemoglobin  -DVT prophylaxis: SCDs,  Lovenox  -Discussed with family    Ruddy Rubi,   Pulmonary Critical Care Medicine  [facility]

## 2024-03-22 NOTE — ANESTHESIA POSTPROCEDURE EVALUATION
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    [facility] AN Post Evaluation:   Patient Evaluated in ICU  Patient Participation: complete - patient cannot participate  Level of Consciousness: lethargicYes    Cardiovascular Status: acceptable  Respiratory Status: ETT and intubated      MAGAN MONREAL MD  3/21/2024 8:11 PM

## 2024-03-22 NOTE — PROGRESS NOTES
[facility]  part of [facility]    Progress Note    Talib Gonzalez Patient Status:  Inpatient    1950 MRN [id number]   Location [facility] 2W/SW Attending Keya Workman,    Hosp Day # 1 PCP No primary care provider on file.     Subjective:  Intubated/sedated on exam this a.m, s/p emergent aortic dissection repair. remains on full vent support.  Wife and daughters currently visiting at bedside.    Objective:  /64 (BP Location: Right arm)   Pulse 88   Temp 98.7 °F (37.1 °C) (Pulmonary Artery)   Resp 20   Ht 6' (1.829 m)   Wt 216 lb 14.9 oz (98.4 kg)   SpO2 100%   BMI 29.42 kg/m²     Temp (24hrs), Av.3 °F (36.3 °C), Min:93.8 °F (34.3 °C), Max:100.1 °F (37.8 °C)  Telemetry-NSR    Intake/Output:    Intake/Output Summary (Last 24 hours) at 3/22/2024 1257  Last data filed at 3/22/2024 1230  Gross per 24 hour   Intake 18396.47 ml   Output 3227 ml   Net 8166.47 ml       Wt Readings from Last 3 Encounters:   24 216 lb 14.9 oz (98.4 kg)       Allergies:  Allergies   Allergen Reactions    Serotonin Reuptake Inhibitors DIARRHEA       Labs:  Lab Results   Component Value Date    WBC 9.9 2024    HGB 11.3 2024    HCT 33.3 2024    .0 2024    CREATSERUM 0.88 2024    BUN 18 2024     2024    K 3.8 2024     2024    CO2 25.0 2024     2024    CA 7.7 2024    PTT 37.3 2024    INR 1.50 2024    DDIMER >20.00 2024    MG 1.9 2024       Physical Exam:  Blood pressure 118/64, pulse 88, temperature 98.7 °F (37.1 °C), temperature source Pulmonary Artery, resp. rate 20, height 6' (1.829 m), weight 216 lb 14.9 oz (98.4 kg), SpO2 100%.  General: Intubated/sedated,   Neck: Difficult to assess with intubation/body habitus  Lungs: CTA anterior  Pericardial drain 128 cc / 12 hours, pleural/mediastinal  cc / 12-hour-no airleak noted  Heart: RRR, S1, S2  Abdomen:  Soft, mild distention, no audible BS, OGT to LIS with bilious drainage  Extremities: Warm, dry, no LE edema bilat  Pulses: 1-2+ bilat DP  Skin: sternotomy stable -dressing CDI  Neurological: Sedated/intubated, s/p emergent when sedation held-moves all extremities spontaneously, does not follow commands yet    Assessment/Plan:  S/p aortic dissection repair with 30 mm x 32 mm Hemashield graft secondary to 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 (acute type a aortic dissection) -POD #1  Wean vent/extubate-management per pulmonary, CXR reviewed, repeat CXR in a.m. ordered  Pain meds as needed, use IV Tylenol, limit narcotics with SBT trial/assess mental status  Currently hemodynamically stable on low-dose dobutamine, Cleviprex, and nitroglycerin-wean off as tolerated.  Maintain goal SBP less than 130.  Once extubated and off IV meds, will DC [place] and A-line likely tomorrow or Sunday.  Increase activity once extubated-OOB to chair/ambulate, likely PT/OT consult  DVT prevention - SCDs/Lovenox  Expected postop anemia - mild/stable, consider starting Iron if Hgb <10, currently Hgb stable 11.3-received multiple products in OR  Expected postop volume overload - monitor daily weights, I/Os, lasix twice daily to start tomorrow if hemodynamically stable  Insulin coverage per protocol  Thrombocytopenia-as expected-monitor CBC, hold ASA today, can resume tomorrow if platelets do not continue to downtrend.  Hipa sent    Discharge planning:  Patient lives at home with wife, has supportive family.  Anticipate DC home once medically stable.  Will continue to assess as he progresses.      Plan of care discussed with pt, bedside RN, wife/daughters, and CV Surgeon: Dr. Darcy De Leon, APRN  3/22/2024  3/22/2024  1300  12:57 PM

## 2024-03-22 NOTE — CONSULTS
Cardiology Consult Note    Talib Gonzalez Patient Status:  Inpatient    1950 MRN [id number]   Location [facility] 2W/SW Attending Keya Workman,    Hosp Day # 1 PCP No primary care provider on file.     HPI.  Talib Gonzalez is a 73 year old male who presented to the ED for lower abdominal cramps, frequent bowel movements and right upper and llower extremity tingling and numbness that started rom early in the morning.   The blood pressure in the ED was noted to be 63/ 37 and the EKG showed normal sinus rhythm, first-degree heart block and RBBB.  CT scan of abdomen showed extensive aortic dissection extending abdominal aorta descending thoracic aorta into the aortic arch and ascending aorta.  He was taken in to the OR and was noted to have  Acute type a dissection extending down to the abdominal aorta & into the common iliacs.    He he underwent repair of dissection.  Coronary arteries were not involved in the dissection.  Aortic valve was normal and was spared.    Currently patient is on vent with chest tubes, [place] and lines in place  Past medical history: None documented  Past surgical history: Nil  Allergies: NKDA  Social history: Smoker, social alcohol  Review of systems: Could not be had as patient is on vent and sedated  --------------------------------------------------------------------------------------------------------------------------------  ROS 10 systems reviewed, pertinent findings above.  ROS      Objective:   Temp: 98.9 °F (37.2 °C)  Pulse: 66  Resp: 13  BP: 103/62  AO: 118/45  FiO2 (%): 35 %    Intake/Output:     Intake/Output Summary (Last 24 hours) at 3/22/2024 1206  Last data filed at 3/22/2024 1100  Gross per 24 hour   Intake 57080.72 ml   Output 3158 ml   Net 8048.72 ml       Physical Exam:     General: On vent and IV sedation  HEENT: Normocephalic, anicteric sclera, neck supple.  Neck: No JVD, carotids 2+, no bruits.  Cardiac: Regular rate and rhythm. S1, S2 normal.  No murmur, pericardial rub, S3.  Lungs: Clear without wheezes, rales, rhonchi or dullness.  Normal excursions and effort.  Abdomen: Soft, non-tender. BS-present.  Extremities: Without clubbing, cyanosis or edema.  Peripheral pulses are 2+.  Neurologic: Non-focal  Skin: Warm and dry.       Assessment:      Extensive type a dissection involving aortic root, ascending aorta, thoracic aorta down to the abdominal aorta and bilateral common iliacs along with ascending aorta aneurysm.  Status post repair   Patient on vent, IV sedation, dobutamine, Clevidipine and insulin drip    Plan:  Line  and chest tube management per surgery  Vent t management per pulmonary  ASA and beta-blockers on hold at present  Antibiotics per protocol    Level of care: C5    Juan Manuel Vance MD        3/22/2024  12:06 PM

## 2024-03-22 NOTE — RESPIRATORY THERAPY NOTE
Bilateral breath sounds auscultated. Suction provided when indicated. No acute events. RT will continue to monitor.       03/22/24 0449   Vent Information   Vent Mode VC/AC   Settings   FiO2 (%) 50 %   Resp Rate (Set) 12   Vt (Set, mL) 600 mL   Waveform Decelerating ramp   PEEP/CPAP (cm H2O) 5 cm H20

## 2024-03-22 NOTE — DIETARY NOTE
Deferred Cardiac Education Note    Consult received for diet education per protocol. Education deferred until pt transferred out of CCU or until s/p intervention and when appropriate for teaching.       Ching Soria RD, LDN  Clinical Dietitian  P: [phone]

## 2024-03-22 NOTE — CM/SW NOTE
Home with family.  HH referrals were sent per protocol.  Will need FU with list of accepting HH provider.s    / to remain available for support and/or discharge planning.      Marylou Zuniga MBA BSN RN CRRN   RN Case Manager  [phone]

## 2024-03-22 NOTE — CONSULTS
[facility]  part of [facility]  [facility]  Report of Consultation    Talib Gonzalez Patient Status:  Inpatient    1950 MRN [id number]   Location [facility] 2W/SW Attending Murphy Oleary MD   Hosp Day # 0 PCP No primary care provider on file.     Date of Admission:  3/21/2024  Date of Consult: 3/21/2024    Reason for Consultation:  Acute type a dissection    History of Present Illness:  Talib Gonzalez is a a(n) 73 year old male.  No medical history other than anxiety and former tobacco abuse.  Awoke today and went to the bathroom when he began to experience sudden onset dizziness, lightheadedness and extreme fatigue with arm tingling and leg tingling.  Presented to the emergency room with blood pressure of 60 to 80s systolic and heart rate in the 40s.  Had multiple episodes of diarrhea and route.  Found to have acute type a day or dissection on imaging.  Will consult for urgent intervention.  Patient seen in the [place] department with his wife at bedside.  Patient is alert oriented x 3.  Describing some chest discomfort.  His blood pressure is 80/40 his heart rate is 45.  There is no pulse in his right radial artery no pulse in the right femoral artery however palpable pulses in the left radial and left femoral arteries.  Abdomen is soft.  Lung sounds are clear.    History:  Past Medical History:   Diagnosis Date    Anxiety      History reviewed. No pertinent surgical history.  No family history on file.       Allergies:  Allergies   Allergen Reactions    Serotonin Reuptake Inhibitors DIARRHEA       Medications:    Current Facility-Administered Medications:     norepinephrine (Levophed) 4 mg/250mL infusion premix, 0.5-30 mcg/min, Intravenous, Continuous    sodium chloride 0.9% infusion, 83 mL/hr, Intravenous, Continuous    norepinephrine (Levophed) 4 mg/250mL infusion premix, 0.5-30 mcg/min, Intravenous, Continuous    dexmedeTOMIDine in sodium chloride 0.9%  (Precedex) 400 mcg/100mL infusion premix, 0.3-0.7 mcg/kg/hr (Dosing Weight), Intravenous, Continuous    insulin regular human (Novolin R, Humulin R) 100 Units in sodium chloride 0.9% 100 mL standard infusion (100 mL), 1-40 Units/hr, Intravenous, Continuous    clevidipine (Cleviprex) 25 MG/50ML IV infusion, 1-6 mg/hr, Intravenous, Continuous    dextrose 5%-sodium chloride 0.9% infusion,  mL/hr, Intravenous, Continuous    melatonin tab 3 mg, 3 mg, Oral, Nightly PRN    polyethylene glycol (PEG 3350) (Miralax) 17 g oral packet 17 g, 17 g, Oral, Daily PRN    sennosides (Senokot) tab 17.2 mg, 17.2 mg, Oral, Nightly PRN    bisacodyl (Dulcolax) 10 MG rectal suppository 10 mg, 10 mg, Rectal, Daily PRN    fleet enema (Fleet) 7-19 GM/118ML rectal enema 133 mL, 1 enema, Rectal, Once PRN    ondansetron (Zofran) 4 MG/2ML injection 4 mg, 4 mg, Intravenous, Q6H PRN    metoclopramide (Reglan) 5 mg/mL injection 10 mg, 10 mg, Intravenous, Q6H    famotidine (Pepcid) tab 20 mg, 20 mg, Oral, BID **OR** famotidine (Pepcid) 20 mg/2mL injection 20 mg, 20 mg, Intravenous, BID    albumin human (Albumin) 5% injection 12.5 g, 12.5 g, Intravenous, Once PRN    DOBUTamine in dextrose 5% (Dobutrex) 500 mg/250mL infusion premix, 2.5-20 mcg/kg/min, Intravenous, Continuous PRN    nitroGLYCERIN in dextrose 5% 50 mg/250mL infusion premix, 5-300 mcg/min, Intravenous, Continuous PRN    norepinephrine (Levophed) 4 mg/250mL infusion premix, 0.5-30 mcg/min, Intravenous, Continuous PRN    [START ON 3/22/2024] metoprolol tartrate (Lopressor) tab 25 mg, 25 mg, Oral, 2x Daily(Beta Blocker)    milrinone in dextrose 5% (Primacor) 20 mg/100mL infusion premix, 0.125-0.25 mcg/kg/min, Intravenous, PRN    potassium chloride 20 mEq/100mL IVPB premix 20 mEq, 20 mEq, Intravenous, PRN **OR** potassium chloride 40 mEq/100mL IVPB premix (central line) 40 mEq, 40 mEq, Intravenous, PRN    magnesium sulfate in dextrose 5% 1 g/100mL infusion premix 1 g, 1 g, Intravenous,  PRN    magnesium sulfate in sterile water for injection 2 g/50mL IVPB premix 2 g, 2 g, Intravenous, PRN    mupirocin (Bactroban) 2% nasal ointment 1 Application, 1 Application, Nasal, BID    chlorhexidine gluconate (Peridex) 0.12 % oral solution 15 mL, 15 mL, Mouth/Throat, BID    [START ON 3/22/2024] multivitamin (Tab-A-Juanita/Beta Carotene) tab 1 tablet, 1 tablet, Oral, Daily    [START ON 3/22/2024] ascorbic acid (Vitamin C) tab 500 mg, 500 mg, Oral, TID    [START ON 3/22/2024] enoxaparin (Lovenox) 40 MG/0.4ML SUBQ injection 40 mg, 40 mg, Subcutaneous, Daily    acetaminophen (Ofirmev) 10 mg/mL infusion premix 1,000 mg, 1,000 mg, Intravenous, Q8H    acetaminophen (Tylenol) tab 650 mg, 650 mg, Oral, Q4H PRN **OR** HYDROcodone-acetaminophen (Norco) 5-325 MG per tab 1 tablet, 1 tablet, Oral, Q4H PRN **OR** HYDROcodone-acetaminophen (Norco) 5-325 MG per tab 2 tablet, 2 tablet, Oral, Q4H PRN    morphINE PF 2 MG/ML injection 2 mg, 2 mg, Intravenous, Q2H PRN **OR** morphINE PF 4 MG/ML injection 4 mg, 4 mg, Intravenous, Q2H PRN **OR** morphINE PF 4 MG/ML injection 8 mg, 8 mg, Intravenous, Q2H PRN    dexmedeTOMIDine in sodium chloride 0.9% (Precedex) 400 mcg/100mL infusion premix, 0.2-1.5 mcg/kg/hr, Intravenous, Continuous    [START ON 3/22/2024] aspirin DR tab 81 mg, 81 mg, Oral, Daily    ceFAZolin (Ancef) 2 g in 20mL IV syringe premix, 2 g, Intravenous, Q8H    [START ON 3/22/2024] sennosides (Senokot) tab 8.6 mg, 8.6 mg, Oral, BID    [START ON 3/22/2024] docusate sodium (Colace) cap 100 mg, 100 mg, Oral, BID    glucose (Dex4) 15 GM/59ML oral liquid 15 g, 15 g, Oral, Q15 Min PRN **OR** glucose (Glutose) 40% oral gel 15 g, 15 g, Oral, Q15 Min PRN **OR** glucose-vitamin C (Dex-4) chewable tab 4 tablet, 4 tablet, Oral, Q15 Min PRN **OR** dextrose 50% injection 50 mL, 50 mL, Intravenous, Q15 Min PRN **OR** glucose (Dex4) 15 GM/59ML oral liquid 30 g, 30 g, Oral, Q15 Min PRN **OR** glucose (Glutose) 40% oral gel 30 g, 30 g, Oral,  Q15 Min PRN **OR** glucose-vitamin C (Dex-4) chewable tab 8 tablet, 8 tablet, Oral, Q15 Min PRN    insulin regular human (Novolin R, Humulin R) 100 Units in sodium chloride 0.9% 100 mL standard infusion (100 mL), 1-28 Units/hr, Intravenous, Continuous    clevidipine (Cleviprex) 25 MG/50ML IV infusion, 1-6 mg/hr, Intravenous, Continuous    sugammadex (Bridion) 200 MG/2ML injection 200 mg, 2 mg/kg, Intravenous, Once **OR** sugammadex (Bridion) 200 MG/2ML injection 390 mg, 4 mg/kg, Intravenous, Once    fentaNYL (Sublimaze) 50 mcg/mL injection 25 mcg, 25 mcg, Intravenous, Q30 Min PRN **OR** fentaNYL (Sublimaze) 50 mcg/mL injection 50 mcg, 50 mcg, Intravenous, Q30 Min PRN    acetaminophen (Tylenol) tab 650 mg, 650 mg, Oral, Q4H PRN **OR** acetaminophen (Tylenol) 160 MG/5ML oral liquid 650 mg, 650 mg, Oral, Q4H PRN **OR** acetaminophen (Tylenol) rectal suppository 650 mg, 650 mg, Rectal, Q4H PRN **OR** acetaminophen (Ofirmev) 10 mg/mL infusion premix 1,000 mg, 1,000 mg, Intravenous, Q6H PRN    ketorolac (Toradol) 30 MG/ML injection 15 mg, 15 mg, Intravenous, Q6H PRN    fentaNYL (Sublimaze) 25 mcg BOLUS FROM BAG infusion, 25 mcg, Intravenous, Q30 Min PRN **OR** fentaNYL (Sublimaze) 50 mcg BOLUS FROM BAG infusion, 50 mcg, Intravenous, Q30 Min PRN    fentaNYL in sodium chloride 0.9% (Sublimaze) 1000 mcg/100mL infusion premix,  mcg/hr, Intravenous, Continuous PRN    polyethylene glycol (PEG 3350) (Miralax) 17 g oral packet 17 g, 17 g, Oral, Daily PRN    senna (Senokot) 8.8 MG/5ML oral syrup 17.6 mg, 10 mL, Oral, Nightly PRN    bisacodyl (Dulcolax) 10 MG rectal suppository 10 mg, 10 mg, Rectal, Daily PRN    fleet enema (Fleet) 7-19 GM/118ML rectal enema 133 mL, 1 enema, Rectal, Once PRN    chlorhexidine gluconate (Peridex) 0.12 % oral solution 15 mL, 15 mL, Mouth/Throat, BID@0800,2000    morphINE PF 4 MG/ML injection, , ,     albumin human (Albumin) 5% injection, , ,     nitroGLYCERIN in dextrose 5% 50 mg/250mL infusion  premix, , ,     dexmedeTOMIDine in sodium chloride 0.9% (Precedex) 400 mcg/100mL infusion premix, , ,     propofol (Diprivan) 10 MG/ML injection, , ,     Review of Systems:  Pertinent items are noted in HPI.    Physical Exam:  Blood pressure (!) 173/84, pulse 77, temperature 97.8 °F (36.6 °C), resp. rate 11, height 6' (1.829 m), weight 217 lb (98.4 kg), SpO2 100%.    Limited exam per HPI    Laboratory Data:  Recent Labs   Lab 03/21/24  0919   *   BUN 18   CREATSERUM 1.03   EGFRCR 77   CA 9.3      K 4.6      CO2 25.0     Recent Labs   Lab 03/21/24  0919 03/21/24  1810   RBC 4.45  --    HGB 13.8  --    HCT 42.1  --    MCV 94.6  --    MCH 31.0  --    MCHC 32.8  --    RDW 13.6  --    NEPRELIM 4.67  --    WBC 7.8  --    .0 112.0*           Impression and Plan:  Patient Active Problem List   Diagnosis    Dissection of thoracoabdominal aorta (HCC)     73-year-old male with acute type aortic dissection    Imaging reviewed, consistent with acute type a aortic dissection with evidence of malperfusion given loss of pulses and diarrhea concerning for malperfusion of the intestines.  Recommend proceeding emergently to the operating room for repair of acute type a dissection.  I discussed the patient his pathology and indication for operation.  We discussed alternatives of medical management but that will likely rupture he will pass in the next 24 hours without intervention.  We discussed risk the operation including but not limited to bleeding, stroke, coagulopathy, blood transfusion, prolonged ventilation, kidney failure, liver failure, multisystem organ dysfunction, imponderables, death.  Voiced understanding wished to proceed.  We are proceeding urgently to the operating at this time.    Peter Taveras MD  Cardiothoracic Surgery  [facility]     Time spent on counseling/coordination of care:  69749- 60 min    Total time spent with patient:  30 Minutes    Peter Taveras MD  3/21/2024  8:24  PM

## 2024-03-22 NOTE — CM/SW NOTE
Department  notified of request for HHC, aidin referrals started. Assigned CM/SW to follow up with pt/family on further discharge planning.        Pooja Gardner  DSC

## 2024-03-22 NOTE — PLAN OF CARE
Patient intubated and sedated.  Attempted awakening trial x1 today, patient thrashes and moves all extremities well.  Unable to follow commands.  Chest tubes in place draining serosanguinous drainage, Milad approx 20cc/hr, mediastinal/pleural CT draining approx 2 - 10 ml/hr.  Lees in place draining clear yellow urine approx 40 - 60 ml/hr.  Pacer in place, frequent pacer spikes observed, CT Surgery aware.  Plan to keep patient intubated and sedated overnight, attempt SBT/Awakening trial tomorrow if appropriate.  Family at bedside aware of plan of care.  Problem: Patient Centered Care  Goal: Patient preferences are identified and integrated in the patient's plan of care  Description: Interventions:  - What would you like us to know as we care for you? From home with spouse Lucie, generally healthy overall  - Provide timely, complete, and accurate information to patient/family  - Incorporate patient and family knowledge, values, beliefs, and cultural backgrounds into the planning and delivery of care  - Encourage patient/family to participate in care and decision-making at the level they choose  - Honor patient and family perspectives and choices  Outcome: Progressing     Problem: Patient/Family Goals  Goal: Patient/Family Long Term Goal  Description: Patient's Long Term Goal: fully recover and return home with wife and family    Interventions:  - discharge planning when appropriate  - safe management during post-op period  - See additional Care Plan goals for specific interventions  Outcome: Progressing  Goal: Patient/Family Short Term Goal  Description: Patient's Short Term Goal: recover in immediate post-op period, safe BP and hemodynamic stability - goals discussed w/ wife at bedside while patient intubated/sedated    Interventions:   - maintain intubation/sedation overnight for POD#0  - continue monitoring via swan/aimee throughout POD#0-1  - See additional Care Plan goals for specific interventions  Outcome:  Progressing     Problem: CARDIOVASCULAR - ADULT  Goal: Maintains optimal cardiac output and hemodynamic stability  Description: INTERVENTIONS:  - Monitor vital signs, rhythm, and trends  - Monitor for bleeding, hypotension and signs of decreased cardiac output  - Evaluate effectiveness of vasoactive medications to optimize hemodynamic stability  - Monitor arterial and/or venous puncture sites for bleeding and/or hematoma  - Assess quality of pulses, skin color and temperature  - Assess for signs of decreased coronary artery perfusion - ex. Angina  - Evaluate fluid balance, assess for edema, trend weights  Outcome: Progressing  Goal: Absence of cardiac arrhythmias or at baseline  Description: INTERVENTIONS:  - Continuous cardiac monitoring, monitor vital signs, obtain 12 lead EKG if indicated  - Evaluate effectiveness of antiarrhythmic and heart rate control medications as ordered  - Initiate emergency measures for life threatening arrhythmias  - Monitor electrolytes and administer replacement therapy as ordered  Outcome: Progressing     Problem: RESPIRATORY - ADULT  Goal: Achieves optimal ventilation and oxygenation  Description: INTERVENTIONS:  - Assess for changes in respiratory status  - Assess for changes in mentation and behavior  - Position to facilitate oxygenation and minimize respiratory effort  - Oxygen supplementation based on oxygen saturation or ABGs  - Provide Smoking Cessation handout, if applicable  - Encourage broncho-pulmonary hygiene including cough, deep breathe, Incentive Spirometry  - Assess the need for suctioning and perform as needed  - Assess and instruct to report SOB or any respiratory difficulty  - Respiratory Therapy support as indicated  - Manage/alleviate anxiety  - Monitor for signs/symptoms of CO2 retention  Outcome: Progressing     Problem: GASTROINTESTINAL - ADULT  Goal: Minimal or absence of nausea and vomiting  Description: INTERVENTIONS:  - Maintain adequate hydration with IV  or PO as ordered and tolerated  - Nasogastric tube to low intermittent suction as ordered  - Evaluate effectiveness of ordered antiemetic medications  - Provide nonpharmacologic comfort measures as appropriate  - Advance diet as tolerated, if ordered  - Obtain nutritional consult as needed  - Evaluate fluid balance  Outcome: Progressing  Goal: Maintains or returns to baseline bowel function  Description: INTERVENTIONS:  - Assess bowel function  - Maintain adequate hydration with IV or PO as ordered and tolerated  - Evaluate effectiveness of GI medications  - Encourage mobilization and activity  - Obtain nutritional consult as needed  - Establish a toileting routine/schedule  - Consider collaborating with pharmacy to review patient's medication profile  Outcome: Progressing

## 2024-03-22 NOTE — OPERATIVE REPORT
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]    Acute type a aortic dissection    Surgeon(s) and Role:      * Peter Taveras MD - Primary Surgeon     * Murphy Oleary MD - Assisting Surgeon     Assistant: Surgical Assistant.: Gregory Linda RSA, Talib Sr PAC    Procedures:   Median sternotomy  Emergent repair of type a aortic dissection with deep hypothermic circulatory arrest and antegrade cerebral perfusion   Replacement of ascending aorta and hemiarch with 32 mm Hemashield platinum graft  Antegrade cerebral perfusion  Right common femoral arterial cannulation    Indication:  73-year-old male presented to emergency room with a few hour history of dizziness, lightheadedness and right-sided paresthesias after bowel movement this morning.  Had multiple episodes of diarrhea and route.  Emergency room was found to be hypotensive, bradycardic.  Ultimately found to have acute type aortic dissection with extensive extension of the dissection into his arch, descending thoracic aorta, abdominal aorta into the iliac arteries.  On examination, there was no right radial pulse and no right femoral pulse, however there was a palpable left radial and left femoral pulse.  Patient was mentating well, alert and oriented x 3.  I discussed with the patient and family in the emergency room emergency room alternatives, benefits and risks of proceeding with emergent repair of his type a dissection.  Consent obtained patient taken urgently to the operating room.    Surgical Findings:   No pericardial effusion  Ascending aortic aneurysm  Acute type a dissection with entry tear on the lesser curvature of the ascending aorta and a second entry tear in the underside of the aortic arch along the lesser  curvature.    Description procedure:  Patient taken urgently to the operating.  Anesthesia induced without event and lines were placed.  Patient was prepped and draped in usual sterile fashion.  Timeout performed verifying patient and procedure.  A vertical incision made in the left inguinal region overlying the femoral artery which was dissected out and exposed.  Skin incision made in the sternum and median sternotomy performed.  Pericardial well created.  There is evidence of an acute type a dissection and ascending aortic aneurysm.  Systemic heparin was given.  A pursestring was placed on the femoral artery with 5-0 Prolene suture and through this, utilizing Seldinger technique, a 20 Luxembourger cannula was placed into the left common femoral artery.  The right atrium was cannulated 3 procedure venous cannula.  ACT confirmed over 480 and cardiopulmonary bypass commenced.  We began cooling.  Retrograde catheter placed in the coronary sinus via the right atrial free wall.  Left ventricular vent placed via the right superior pulmonary vein.  The aorta and aortic arch were dissected out, caval snares placed around the innominate artery and left carotid artery.  The innominate vein was encircled to allow for retraction.  While cooling, the patient began to fibrillate therefore cross-clamp was applied.  Retrograde cardioplegia was given.  Cardioplegia was given retrograde every 5020 minutes on the operation.  The aorta was then opened.  There was evidence of acute type a dissection with true and false lumen.  Inspection of the ascending aorta and root demonstrated no entry tear.  The false lumen appeared to extend down to the sinotubular junction but did not extend further down by the left or right main coronary arteries.  The aorta was then resected to the sinotubular junction.  The aortic valve appeared trileaflet with no obvious pathology.  Direct cardioplegia was given down the right main coronary artery via a 0 bend.   The valve was resuspended by placing 3 5-0 pledgeted sutures at the commissures and externalizing and tying them down.  The sinotubular junction was then reconstructed by placing a Geovanny felt strip in the false lumen, then a strip of Geovanny felt on the inside and a sterile Geovanny felt outside the adventitia.  The 3 layers were then reapproximated multiple 4-0 Prolene's in a horizontal mattress fashion fully reconstruct the walls of the sinotubular junction.  Adventitia was preserved.  At this point, the patient reached 18 °C.  Head was packed in ice and patient in steep Trendelenburg.  Cross-clamp removed and cardiopulmonary bypass machine was weaned off.  Inspection of the distal aorta demonstrated an entry tear in the lesser curvature of the distal ascending aorta and a second tear just a little bit further distally in the underside of the aortic arch.  The aortic arch was fully mobilized to allow for hemiarch repair.  The recurrent laryngeal nerve was visualized on the aorta, it was dissected free and spared.  Intima media of the dissected aorta was then resected.  No further tear was seen down in the distal aorta.  No tear seen toward the head vessels.  The layers of the distal aorta were then reapproximated the same exact fashion as we did for the sinotubular junction.  Aorta was sized for 32 mm Hemashield graft.  It was opened and tailored.  It was then sewn in an end-to-side fashion to the aortic hemiarch with a 3-0 Prolene suture and reinforced the running 4-0 Prolene suture.  Gundry catheters removed.  Flow reestablished via the carbon bypass machine, graft de-aired and cross-clamp placed across the graft.  Patient was then rewarmed.  During rewarming process, the graft was sized for length to the sinotubular junction.  It was then anastomose to the sacral junction in end-to-end fashion with a 3-0 Prolene suture and reinforced to 4-0 Prolene suture.  Antegrade needle was placed in the graft through a  pursestring of 4-0 Prolene suture.  Cross-clamp was then removed.  Right ventricular pacing wires placed and externalized.  Infection of the graft demonstrated no tremendous bleeding from the suture line.  Patient was fully rewarmed.  During this time, patient again a spontaneous normal sinus rhythm and blood pressure was managed.  Once fully warmed, patient was weaned off cardiopulmonary bypass without event.  Protamine was given and patient was fully decannulated.  There is severe, profound coagulopathy with bleeding from all raw surfaces and needle holes.  Blood product was administered as was factor VII with significant improvement in the coagulopathy.  Chest tubes in place, 32 Belarusian chest tube x 2 with the tips in each pleural space as well as a 24 Belarusian Milad drain along the right ventricle and tip near the graft.  Pericardium reapproximated over the aorta.  Sternum was then closed multiple double stainless steel wires.  Incision is then copiously irrigated, closed in multiple layers.  Groin incision is then closed multiple layers after copious irrigation.  Sterile dressings then applied.  Patient taken to the ICU in critical condition on low-dose nitroglycerin and dobutamine infusions, and normal sinus rhythm.  Sponge, needle and instrument counts were all correct at the end of the case.  Total time of circulatory arrest was around 52 minutes, around 40 minutes of which were with antegrade cerebral perfusion.    Anesthesia: Cardiac    Complications: None    Implants: 32 mm Hemashield platinum graft    Specimen: Aorta    Drains: 32 Belarusian chest tubes x 2, 24 Belarusian Milad drain x 1    Condition: Critical    Estimated Blood Loss: 600 mL    Peter Taveras MD

## 2024-03-22 NOTE — PROGRESS NOTES
[facility]  part of [facility]    Progress Note    Talib Gonzalez Patient Status:  Inpatient    1950 MRN [id number]   Location [facility] 2W/SW Attending Keya Workman,    Hosp Day # 1 PCP No primary care provider on file.     Chief complaint aortic dissection     Subjective:   Talib Gonzalez is a(n) 73 year old male pt sedated and on ventilator.     Unable to do ros     Objective:   Blood pressure 99/55, pulse 72, temperature 99.2 °F (37.3 °C), temperature source Pulmonary Artery, resp. rate 11, height 6' (1.829 m), weight 216 lb 14.9 oz (98.4 kg), SpO2 100%.      Intake/Output Summary (Last 24 hours) at 3/22/2024 1719  Last data filed at 3/22/2024 1700  Gross per 24 hour   Intake 25568.72 ml   Output 2829 ml   Net 7340.72 ml       Patient Weight(s) for the past 336 hrs:   Weight   24 0208 216 lb 14.9 oz (98.4 kg)   24 1248 217 lb (98.4 kg)   24 1231 217 lb (98.4 kg)           General appearance: sedated   Pulmonary:  clear to auscultation bilaterally  Cardiovascular: S1, S2 normal, no murmur, click, rub or gallop, regular rate and rhythm  Abdominal: soft, non-tender; bowel sounds normal; no masses,  no organomegaly, chest tubes in place  Extremities: extremities normal, atraumatic, no cyanosis or edema        Medicines:     Current Facility-Administered Medications   Medication Dose Route Frequency    calcium gluconate 1g in 100mL iso-NaCl IVPB premix  1 g Intravenous Once    norepinephrine (Levophed) 4 mg/250mL infusion premix  0.5-30 mcg/min Intravenous Continuous    sodium chloride 0.9% infusion  83 mL/hr Intravenous Continuous    dextrose 5%-sodium chloride 0.9% infusion   mL/hr Intravenous Continuous    melatonin tab 3 mg  3 mg Oral Nightly PRN    polyethylene glycol (PEG 3350) (Miralax) 17 g oral packet 17 g  17 g Oral Daily PRN    sennosides (Senokot) tab 17.2 mg  17.2 mg Oral Nightly PRN    bisacodyl (Dulcolax) 10 MG rectal suppository  10 mg  10 mg Rectal Daily PRN    fleet enema (Fleet) 7-19 GM/118ML rectal enema 133 mL  1 enema Rectal Once PRN    ondansetron (Zofran) 4 MG/2ML injection 4 mg  4 mg Intravenous Q6H PRN    metoclopramide (Reglan) 5 mg/mL injection 10 mg  10 mg Intravenous Q6H    famotidine (Pepcid) tab 20 mg  20 mg Oral BID    Or    famotidine (Pepcid) 20 mg/2mL injection 20 mg  20 mg Intravenous BID    albumin human (Albumin) 5% injection 12.5 g  12.5 g Intravenous Once PRN    DOBUTamine in dextrose 5% (Dobutrex) 500 mg/250mL infusion premix  2.5-20 mcg/kg/min Intravenous Continuous PRN    nitroGLYCERIN in dextrose 5% 50 mg/250mL infusion premix  5-300 mcg/min Intravenous Continuous PRN    norepinephrine (Levophed) 4 mg/250mL infusion premix  0.5-30 mcg/min Intravenous Continuous PRN    metoprolol tartrate (Lopressor) tab 25 mg  25 mg Oral 2x Daily(Beta Blocker)    milrinone in dextrose 5% (Primacor) 20 mg/100mL infusion premix  0.125-0.25 mcg/kg/min Intravenous PRN    potassium chloride 20 mEq/100mL IVPB premix 20 mEq  20 mEq Intravenous PRN    Or    potassium chloride 40 mEq/100mL IVPB premix (central line) 40 mEq  40 mEq Intravenous PRN    magnesium sulfate in dextrose 5% 1 g/100mL infusion premix 1 g  1 g Intravenous PRN    magnesium sulfate in sterile water for injection 2 g/50mL IVPB premix 2 g  2 g Intravenous PRN    mupirocin (Bactroban) 2% nasal ointment 1 Application  1 Application Nasal BID    chlorhexidine gluconate (Peridex) 0.12 % oral solution 15 mL  15 mL Mouth/Throat BID    multivitamin (Tab-A-Juanita/Beta Carotene) tab 1 tablet  1 tablet Oral Daily    ascorbic acid (Vitamin C) tab 500 mg  500 mg Oral TID    enoxaparin (Lovenox) 40 MG/0.4ML SUBQ injection 40 mg  40 mg Subcutaneous Daily    acetaminophen (Tylenol) tab 650 mg  650 mg Oral Q4H PRN    Or    HYDROcodone-acetaminophen (Norco) 5-325 MG per tab 1 tablet  1 tablet Oral Q4H PRN    Or    HYDROcodone-acetaminophen (Norco) 5-325 MG per tab 2 tablet  2 tablet Oral  Q4H PRN    morphINE PF 2 MG/ML injection 2 mg  2 mg Intravenous Q2H PRN    Or    morphINE PF 4 MG/ML injection 4 mg  4 mg Intravenous Q2H PRN    Or    morphINE PF 4 MG/ML injection 8 mg  8 mg Intravenous Q2H PRN    dexmedeTOMIDine in sodium chloride 0.9% (Precedex) 400 mcg/100mL infusion premix  0.2-1.5 mcg/kg/hr Intravenous Continuous    aspirin DR tab 81 mg  81 mg Oral Daily    ceFAZolin (Ancef) 2 g in 20mL IV syringe premix  2 g Intravenous Q8H    sennosides (Senokot) tab 8.6 mg  8.6 mg Oral BID    docusate sodium (Colace) cap 100 mg  100 mg Oral BID    glucose (Dex4) 15 GM/59ML oral liquid 15 g  15 g Oral Q15 Min PRN    Or    glucose (Glutose) 40% oral gel 15 g  15 g Oral Q15 Min PRN    Or    glucose-vitamin C (Dex-4) chewable tab 4 tablet  4 tablet Oral Q15 Min PRN    Or    dextrose 50% injection 50 mL  50 mL Intravenous Q15 Min PRN    Or    glucose (Dex4) 15 GM/59ML oral liquid 30 g  30 g Oral Q15 Min PRN    Or    glucose (Glutose) 40% oral gel 30 g  30 g Oral Q15 Min PRN    Or    glucose-vitamin C (Dex-4) chewable tab 8 tablet  8 tablet Oral Q15 Min PRN    insulin regular human (Novolin R, Humulin R) 100 Units in sodium chloride 0.9% 100 mL standard infusion (100 mL)  1-28 Units/hr Intravenous Continuous    clevidipine (Cleviprex) 25 MG/50ML IV infusion  1-6 mg/hr Intravenous Continuous    fentaNYL (Sublimaze) 50 mcg/mL injection 25 mcg  25 mcg Intravenous Q30 Min PRN    Or    fentaNYL (Sublimaze) 50 mcg/mL injection 50 mcg  50 mcg Intravenous Q30 Min PRN    acetaminophen (Tylenol) rectal suppository 650 mg  650 mg Rectal Q4H PRN    ketorolac (Toradol) 30 MG/ML injection 15 mg  15 mg Intravenous Q6H PRN    fentaNYL (Sublimaze) 25 mcg BOLUS FROM BAG infusion  25 mcg Intravenous Q30 Min PRN    Or    fentaNYL (Sublimaze) 50 mcg BOLUS FROM BAG infusion  50 mcg Intravenous Q30 Min PRN    fentaNYL in sodium chloride 0.9% (Sublimaze) 1000 mcg/100mL infusion premix   mcg/hr Intravenous Continuous PRN     propofol (Diprivan) 10 mg/mL infusion premix  5-50 mcg/kg/min Intravenous Continuous       Lab Results   Component Value Date    WBC 9.9 03/22/2024    HGB 11.3 (L) 03/22/2024    HCT 33.3 (L) 03/22/2024    .0 (L) 03/22/2024    CREATSERUM 0.69 (L) 03/22/2024    BUN 13 03/22/2024     03/22/2024    K 4.2 03/22/2024     03/22/2024    CO2 23.0 03/22/2024     (H) 03/22/2024    CA 7.4 (L) 03/22/2024    ALB 3.7 03/30/2023    ALKPHO 74 03/30/2023    BILT 0.5 03/30/2023    TP 7.4 03/30/2023    AST 21 03/30/2023    ALT 26 03/30/2023    PTT 37.3 (H) 03/21/2024    INR 1.50 (H) 03/21/2024    TSH 2.503 03/21/2024    PSA 4.0 12/05/2017    DDIMER >20.00 (H) 03/21/2024    MG 1.9 03/22/2024       XR CHEST AP PORTABLE  (CPT=71045)    Result Date: 3/22/2024  PROCEDURE: XR CHEST AP PORTABLE  (CPT=71045) TIME: 642  COMPARISON: [facility], XR CHEST AP PORTABLE (CPT=71045), 3/21/2024, 12:33 PM.  [facility], CTA CHEST+CTA ABDOMEN DISSECT SET (CPT=71275/49777), 3/21/2024, 11:44 AM.  [facility], XR CHEST AP PORTABLE (CPT=71045), 3/21/2024, 8:25 PM.  INDICATIONS: Follow up open heart surgery.  TECHNIQUE:   Single view.   Findings and impression:  ETT 5.5 cm above the christiano enteric tube beneath the diaphragm  PA catheter is in the right ventricle, similar to prior  Mediastinal and pericardial drain in place.   Normal heart size.  Diffuse widening of the upper mediastinum is unchanged from immediate postoperative image, not present on preoperative imaging.  This may represent hematoma  There is no pulmonary edema  No pneumothorax or large effusion  Minimal atelectasis at the bases      Dictated by (CST): Sebastian Alicea MD on 3/22/2024 at 8:21 AM     Finalized by (CST): Sebastian Alicea MD on 3/22/2024 at 8:27 AM          XR CHEST AP PORTABLE  (CPT=71045)    Result Date: 3/21/2024  CONCLUSION:   Postoperative changes involving the thoracic aorta with multiple support devices,  which are described in detail above.  Mild pulmonary venous congestion.  No focal opacity, pleural effusion, or pneumothorax.   Dictated by (CST): William Brown MD on 3/21/2024 at 9:00 PM     Finalized by (CST): William Brown MD on 3/21/2024 at 9:05 PM          XR CHEST AP PORTABLE  (CPT=71045)    Result Date: 3/21/2024  PROCEDURE: XR CHEST AP PORTABLE  (CPT=71045) TIME: 1233  COMPARISON: None.  INDICATIONS: Central Line Placement  TECHNIQUE:   Single view.   Findings and impression:  Right IJ catheter in the mid SVC  Normal heart size with no edema  Lungs are clear  Normal pleura  No free air     Dictated by (CST): Sebastian Alicea MD on 3/21/2024 at 12:52 PM     Finalized by (CST): Sebastian Alicea MD on 3/21/2024 at 12:54 PM          CTA CHEST+CTA ABDOMEN DISSECT SET (CPT=71275/12761)    Result Date: 3/21/2024  CONCLUSION:  Massive type A dissection extending from the aortic root to the bilateral common iliac arteries.  The dissection extends into the aortic arch great vessels, celiac axis, and SMA.  99% stenosis with near complete occlusion involving the proximal right common carotid artery and majority of the SMA.  90% stenosis involving the left proximal common carotid artery.  Mild wall thickening of the ascending colon may be secondary to early changes of ischemia.  No pneumatosis or pneumoperitoneum.  Critical results were communicated to Dr. Maurer on 3/21/2024 at 1211 hours   Dictated by (CST): Silverio Rahman MD on 3/21/2024 at 12:07 PM     Finalized by (CST): Silverio Rahman MD on 3/21/2024 at 12:20 PM          CT ABDOMEN+PELVIS(CONTRAST ONLY)(CPT=74177)    Result Date: 3/21/2024  CONCLUSION:  1. Extensive aortic dissection, which extends from the imaged aortic root throughout descending thoracic and abdominal aorta as well as into the right greater than left common iliac arteries.  Notably, the dissection flap extends into the superior mesenteric artery and this results in severe long segment SMA stenosis.  More  distal SMA branches are patent.  However, some of the proximal right SMA branches are not definitively opacified.  There is also suspected long segment ascending colonic wall thickening with mild surrounding inflammatory stranding and abnormal fluid in the proximal colon.  These findings raise suspicion for early ascending colonic ischemia.  Suggest a follow-up/completion CT angiogram of the chest for complete evaluation.  Vascular surgery assessment is also recommended.  Findings were discussed with Dr. Maurer at the time of dictation. 2. No definite pneumatosis intestinalis, portal venous gas, free intraperitoneal air, or well-defined/drainable intra-abdominal collection. 3. Colonic diverticulosis.  Mild rectosigmoid fecal impaction. 4. Slab a below small hepatic cyst. 5. Probable contiguous left adrenal adenomas. 6. Coronary and peripheral atherosclerosis. 7. Mild prostatomegaly. 8. Lesser incidental findings as above.   elm-remote  Dictated by (CST): Saúl Isbell MD on 3/21/2024 at 11:13 AM     Finalized by (CST): Saúl Isbell MD on 3/21/2024 at 11:35 AM         EKG 12 Lead    Result Date: 3/22/2024  Sinus rhythm with 1st degree A-V block Right bundle branch block Abnormal ECG When compared with ECG of 21-MAR-2024 20:11, No significant change was found Confirmed by DINESH LOPEZ JORDAN ([id number]) on 3/22/2024 8:02:23 AM    EKG 12 Lead    Result Date: 3/22/2024  Sinus rhythm with marked sinus arrythmia with 1st degree A-V block with occasional ventricular-paced complexes Right bundle branch block Abnormal ECG When compared with ECG of 21-MAR-2024 23:33, Electronic ventricular pacemaker has replaced Sinus rhythm Confirmed by DINESH LOPEZ JORDAN ([id number]) on 3/22/2024 8:01:57 AM    EKG 12 Lead    Result Date: 3/22/2024  Sinus rhythm with 1st degree A-V block Right bundle branch block Abnormal ECG When compared with ECG of 21-MAR-2024 20:14, No significant change was found Confirmed by DINESH LOPEZ JORDAN  ([id number]) on 3/22/2024 8:00:45 AM    EKG 12 Lead    Result Date: 3/21/2024  Marked sinus bradycardia with marked sinus arrythmia with 1st degree A-V block Right bundle branch block Abnormal ECG When compared with ECG of 21-MAR-2024 09:02, No significant change was found Confirmed by DINESH LOPEZ JORDAN ([id number]) on 3/21/2024 4:40:59 PM    EKG 12 Lead    Result Date: 3/21/2024  Sinus bradycardia with 1st degree A-V block Right bundle branch block Abnormal ECG No previous ECGs found in [facility] Confirmed by DINESH LOPEZ JORDAN ([id number]) on 3/21/2024 10:30:00 AM     Results:     CBC:    Lab Results   Component Value Date    WBC 9.9 03/22/2024    WBC 12.0 (H) 03/21/2024    WBC 7.8 03/21/2024     Lab Results   Component Value Date    HGB 11.3 (L) 03/22/2024    HGB 9.0 (L) 03/21/2024    HGB 13.8 03/21/2024      Lab Results   Component Value Date    .0 (L) 03/22/2024    .0 (L) 03/21/2024    .0 (L) 03/21/2024       Recent Labs   Lab 03/21/24 2022 03/22/24  0359 03/22/24  1455 03/22/24  1601   * 141*  --  115*   BUN 17 18  --  13   CREATSERUM 0.78 0.88  --  0.69*   CA 7.3* 7.7*  --  7.4*   * 144  --  138   K 4.4 3.8 5.7* 4.2   * 113*  --  112   CO2 25.0 25.0  --  23.0             Assessment and Plan:       Dissection of thoracoabdominal aorta (HCC)   S/p aortic dissection repair with 30 mm x 32 mm Hemashield graft secondary to 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 (acute type a aortic dissection) -POD #1   On dobutamine, precedex , propofol and insulin drip  Plan for extubation today if passes breathing trial   Pericardial drain in place - monitor output  Chest tube - monitor output   Maintain bp less then 130     Thrombocytopenia - monitor cbc daily     DVT ppx lovenox            Keya Workman,          Chart reviewed, including current vitals, notes, labs and imaging  Labs ordered and medications adjusted as outlined above  Coordinate care with care  team/consultants  Discussed with patient results of tests, management plan as outlined above, and the need for ongoing hospitalization  D/w RN     [facility]        3/22/2024     **Certification      PHYSICIAN Certification of Need for Inpatient Hospitalization - Initial Certification    Patient will require inpatient services that will reasonably be expected to span two midnight's based on the clinical documentation in H+P.   Based on patients current state of illness, I anticipate that, after discharge, patient will require TBD.

## 2024-03-22 NOTE — PLAN OF CARE
Received patient from OR, AET 2003. S/P dissection from aortic arch to iliac arteries including occlusion of SMA and extending into carotid arteries bilaterally. Monitoring for ischemic bowel and R side pulses (lost pre-op).     Patient received intubated and sedated, plan to remain that way overnight per Dr. Taveras. Extremely hypertensive upon arrival to room, per anesthesiologist - 12 mg morphine given, precedex increased, propofol started, cleviprex started, nitroglycerin increased significantly. BP improved to goal of <120 sbp.    1u PRBC given. EKG & CXR completed. Dr. Giraldo notified of admission to PCCU. Dr. Maurer nocturnalist at bedside.    Good urine output throughout night. Shift total = 810 ml  CT (pleural Y) shift total = 132 ml  CT (pericardial taryn) shift total = 240 ml    Gtts: Amicar (completed), Nitroglycerine, Cleviprex, Propofol, Precedex, Insulin (Alg 6), Dobutamine    Lines: RIJ [place]/cordis, ETT 7.5@23, OG@60, L radial a-line, CT x2 pleural, CT x1 taryn pericardial, echavarria    Incisions: midsternal, L femoral (cannulation site)          Problem: Patient Centered Care  Goal: Patient preferences are identified and integrated in the patient's plan of care  Description: Interventions:  - What would you like us to know as we care for you? From home with spouse Lucie, generally healthy overall  - Provide timely, complete, and accurate information to patient/family  - Incorporate patient and family knowledge, values, beliefs, and cultural backgrounds into the planning and delivery of care  - Encourage patient/family to participate in care and decision-making at the level they choose  - Honor patient and family perspectives and choices  Outcome: Progressing     Problem: Patient/Family Goals  Goal: Patient/Family Long Term Goal  Description: Patient's Long Term Goal: fully recover and return home with wife and family    Interventions:  - discharge planning when appropriate  - safe management during  post-op period  - See additional Care Plan goals for specific interventions  Outcome: Progressing  Goal: Patient/Family Short Term Goal  Description: Patient's Short Term Goal: recover in immediate post-op period, safe BP and hemodynamic stability - goals discussed w/ wife at bedside while patient intubated/sedated    Interventions:   - maintain intubation/sedation overnight for POD#0  - continue monitoring via swan/aieme throughout POD#0-1  - See additional Care Plan goals for specific interventions  Outcome: Progressing     Problem: CARDIOVASCULAR - ADULT  Goal: Maintains optimal cardiac output and hemodynamic stability  Description: INTERVENTIONS:  - Monitor vital signs, rhythm, and trends  - Monitor for bleeding, hypotension and signs of decreased cardiac output  - Evaluate effectiveness of vasoactive medications to optimize hemodynamic stability  - Monitor arterial and/or venous puncture sites for bleeding and/or hematoma  - Assess quality of pulses, skin color and temperature  - Assess for signs of decreased coronary artery perfusion - ex. Angina  - Evaluate fluid balance, assess for edema, trend weights  Outcome: Progressing  Goal: Absence of cardiac arrhythmias or at baseline  Description: INTERVENTIONS:  - Continuous cardiac monitoring, monitor vital signs, obtain 12 lead EKG if indicated  - Evaluate effectiveness of antiarrhythmic and heart rate control medications as ordered  - Initiate emergency measures for life threatening arrhythmias  - Monitor electrolytes and administer replacement therapy as ordered  Outcome: Progressing     Problem: RESPIRATORY - ADULT  Goal: Achieves optimal ventilation and oxygenation  Description: INTERVENTIONS:  - Assess for changes in respiratory status  - Assess for changes in mentation and behavior  - Position to facilitate oxygenation and minimize respiratory effort  - Oxygen supplementation based on oxygen saturation or ABGs  - Provide Smoking Cessation handout, if  applicable  - Encourage broncho-pulmonary hygiene including cough, deep breathe, Incentive Spirometry  - Assess the need for suctioning and perform as needed  - Assess and instruct to report SOB or any respiratory difficulty  - Respiratory Therapy support as indicated  - Manage/alleviate anxiety  - Monitor for signs/symptoms of CO2 retention  Outcome: Progressing     Problem: GASTROINTESTINAL - ADULT  Goal: Minimal or absence of nausea and vomiting  Description: INTERVENTIONS:  - Maintain adequate hydration with IV or PO as ordered and tolerated  - Nasogastric tube to low intermittent suction as ordered  - Evaluate effectiveness of ordered antiemetic medications  - Provide nonpharmacologic comfort measures as appropriate  - Advance diet as tolerated, if ordered  - Obtain nutritional consult as needed  - Evaluate fluid balance  Outcome: Progressing  Goal: Maintains or returns to baseline bowel function  Description: INTERVENTIONS:  - Assess bowel function  - Maintain adequate hydration with IV or PO as ordered and tolerated  - Evaluate effectiveness of GI medications  - Encourage mobilization and activity  - Obtain nutritional consult as needed  - Establish a toileting routine/schedule  - Consider collaborating with pharmacy to review patient's medication profile  Outcome: Progressing     Problem: METABOLIC/FLUID AND ELECTROLYTES - ADULT  Goal: Hemodynamic stability and optimal renal function maintained  Description: INTERVENTIONS:  - Monitor labs and assess for signs and symptoms of volume excess or deficit  - Monitor intake, output and patient weight  - Monitor urine specific gravity, serum osmolarity and serum sodium as indicated or ordered  - Monitor response to interventions for patient's volume status, including labs, urine output, blood pressure (other measures as available)  - Encourage oral intake as appropriate  - Instruct patient on fluid and nutrition restrictions as appropriate  Outcome: Progressing      Problem: HEMATOLOGIC - ADULT  Goal: Maintains hematologic stability  Description: INTERVENTIONS  - Assess for signs and symptoms of bleeding or hemorrhage  - Monitor labs and vital signs for trends  - Administer supportive blood products/factors, fluids and medications as ordered and appropriate  - Administer supportive blood products/factors as ordered and appropriate  Outcome: Progressing

## 2024-03-23 LAB
ANION GAP SERPL CALC-SCNC: 1 MMOL/L (ref 0–18)
BASE EXCESS BLD CALC-SCNC: 2.5 MMOL/L (ref ?–2)
BASOPHILS # BLD AUTO: 0.01 X10(3) UL (ref 0–0.2)
BASOPHILS NFR BLD AUTO: 0.1 %
BUN BLD-MCNC: 17 MG/DL (ref 9–23)
BUN/CREAT SERPL: 21.3 (ref 10–20)
CA-I BLD-SCNC: 1.15 MMOL/L (ref 0.95–1.32)
CALCIUM BLD-MCNC: 8.1 MG/DL (ref 8.7–10.4)
CHLORIDE SERPL-SCNC: 117 MMOL/L (ref 98–112)
CO2 SERPL-SCNC: 24 MMOL/L (ref 21–32)
COHGB MFR BLD: 1.5 % (ref 0–3)
CREAT BLD-MCNC: 0.8 MG/DL
DEPRECATED RDW RBC AUTO: 50.4 FL (ref 35.1–46.3)
EGFRCR SERPLBLD CKD-EPI 2021: 93 ML/MIN/1.73M2 (ref 60–?)
EOSINOPHIL # BLD AUTO: 0 X10(3) UL (ref 0–0.7)
EOSINOPHIL NFR BLD AUTO: 0 %
ERYTHROCYTE [DISTWIDTH] IN BLOOD BY AUTOMATED COUNT: 14.7 % (ref 11–15)
GLUCOSE BLD-MCNC: 135 MG/DL (ref 70–99)
GLUCOSE BLDC GLUCOMTR-MCNC: 102 MG/DL (ref 70–99)
GLUCOSE BLDC GLUCOMTR-MCNC: 103 MG/DL (ref 70–99)
GLUCOSE BLDC GLUCOMTR-MCNC: 109 MG/DL (ref 70–99)
GLUCOSE BLDC GLUCOMTR-MCNC: 114 MG/DL (ref 70–99)
GLUCOSE BLDC GLUCOMTR-MCNC: 118 MG/DL (ref 70–99)
GLUCOSE BLDC GLUCOMTR-MCNC: 119 MG/DL (ref 70–99)
GLUCOSE BLDC GLUCOMTR-MCNC: 119 MG/DL (ref 70–99)
GLUCOSE BLDC GLUCOMTR-MCNC: 120 MG/DL (ref 70–99)
GLUCOSE BLDC GLUCOMTR-MCNC: 122 MG/DL (ref 70–99)
GLUCOSE BLDC GLUCOMTR-MCNC: 123 MG/DL (ref 70–99)
GLUCOSE BLDC GLUCOMTR-MCNC: 124 MG/DL (ref 70–99)
GLUCOSE BLDC GLUCOMTR-MCNC: 126 MG/DL (ref 70–99)
GLUCOSE BLDC GLUCOMTR-MCNC: 128 MG/DL (ref 70–99)
GLUCOSE BLDC GLUCOMTR-MCNC: 130 MG/DL (ref 70–99)
GLUCOSE BLDC GLUCOMTR-MCNC: 132 MG/DL (ref 70–99)
GLUCOSE BLDC GLUCOMTR-MCNC: 136 MG/DL (ref 70–99)
GLUCOSE BLDC GLUCOMTR-MCNC: 139 MG/DL (ref 70–99)
GLUCOSE BLDC GLUCOMTR-MCNC: 144 MG/DL (ref 70–99)
HCO3 BLDA-SCNC: 26.9 MEQ/L (ref 21–27)
HCT VFR BLD AUTO: 32.4 %
HGB BLD-MCNC: 11.1 G/DL
HGB BLD-MCNC: 8.2 G/DL
IMM GRANULOCYTES # BLD AUTO: 0.06 X10(3) UL (ref 0–1)
IMM GRANULOCYTES NFR BLD: 0.4 %
LACTATE BLD-SCNC: 1.1 MMOL/L (ref 0.5–2)
LYMPHOCYTES # BLD AUTO: 1.33 X10(3) UL (ref 1–4)
LYMPHOCYTES NFR BLD AUTO: 8.7 %
MCH RBC QN AUTO: 31.8 PG (ref 26–34)
MCHC RBC AUTO-ENTMCNC: 34.3 G/DL (ref 31–37)
MCV RBC AUTO: 92.8 FL
METHGB MFR BLD: 0.2 % SAT (ref 0.4–1.5)
MONOCYTES # BLD AUTO: 1.51 X10(3) UL (ref 0.1–1)
MONOCYTES NFR BLD AUTO: 9.8 %
NEUTROPHILS # BLD AUTO: 12.42 X10 (3) UL (ref 1.5–7.7)
NEUTROPHILS # BLD AUTO: 12.42 X10(3) UL (ref 1.5–7.7)
NEUTROPHILS NFR BLD AUTO: 81 %
O2 CT BLD-SCNC: 11.1 VOL% (ref 15–23)
O2/TOTAL GAS SETTING VFR VENT: 30 %
OSMOLALITY SERPL CALC.SUM OF ELEC: 298 MOSM/KG (ref 275–295)
PCO2 BLDA: 33 MM HG (ref 35–45)
PEEP SETTING VENT: 5 CM H2O
PH BLDA: 7.5 [PH] (ref 7.35–7.45)
PLATELET # BLD AUTO: 121 10(3)UL (ref 150–450)
PLATELETS.RETICULATED NFR BLD AUTO: 5.3 % (ref 0–7)
PO2 BLDA: 85 MM HG (ref 80–100)
POTASSIUM BLD-SCNC: 3.8 MMOL/L (ref 3.6–5.1)
POTASSIUM SERPL-SCNC: 3.9 MMOL/L (ref 3.5–5.1)
PRESSURE SUPPORT SETTING VENT: 5 CM H2O
PUNCTURE CHARGE: NO
RBC # BLD AUTO: 3.49 X10(6)UL
SAO2 % BLDA: 95.9 % (ref 94–100)
SODIUM BLD-SCNC: 141 MMOL/L (ref 135–145)
SODIUM SERPL-SCNC: 142 MMOL/L (ref 136–145)
WBC # BLD AUTO: 15.3 X10(3) UL (ref 4–11)

## 2024-03-23 PROCEDURE — 99233 SBSQ HOSP IP/OBS HIGH 50: CPT | Performed by: HOSPITALIST

## 2024-03-23 PROCEDURE — 99233 SBSQ HOSP IP/OBS HIGH 50: CPT | Performed by: INTERNAL MEDICINE

## 2024-03-23 RX ORDER — CLONAZEPAM 0.5 MG/1
0.5 TABLET ORAL 2 TIMES DAILY PRN
Status: DISCONTINUED | OUTPATIENT
Start: 2024-03-23 | End: 2024-03-28

## 2024-03-23 RX ORDER — ACETAMINOPHEN 10 MG/ML
INJECTION, SOLUTION INTRAVENOUS
Status: COMPLETED
Start: 2024-03-23 | End: 2024-03-23

## 2024-03-23 RX ORDER — ACETAMINOPHEN 10 MG/ML
1000 INJECTION, SOLUTION INTRAVENOUS EVERY 6 HOURS PRN
Status: DISCONTINUED | OUTPATIENT
Start: 2024-03-23 | End: 2024-03-28

## 2024-03-23 NOTE — PROGRESS NOTES
[facility]  part of [facility]    Progress Note    Talib Gonzalez Patient Status:  Inpatient    1950 MRN [id number]   Location [facility] 2W/SW Attending Keya Workman,    Hosp Day # 2 PCP No primary care provider on file.     Chief complaint aortic dissection     Subjective:   Talib Gonzalez is a(n) 73 year old male pt sedated and on ventilator.     Unable to do ros     Objective:   Blood pressure 105/49, pulse 69, temperature 98.6 °F (37 °C), temperature source Pulmonary Artery, resp. rate 17, height 6' (1.829 m), weight 240 lb 8.4 oz (109.1 kg), SpO2 100%.      Intake/Output Summary (Last 24 hours) at 3/23/2024 1521  Last data filed at 3/23/2024 1400  Gross per 24 hour   Intake 2228.9 ml   Output 1418 ml   Net 810.9 ml       Patient Weight(s) for the past 336 hrs:   Weight   24 0700 240 lb 8.4 oz (109.1 kg)   24 0208 216 lb 14.9 oz (98.4 kg)   24 1248 217 lb (98.4 kg)   24 1231 217 lb (98.4 kg)           General appearance: sedated   Pulmonary:  clear to auscultation bilaterally  Cardiovascular: S1, S2 normal, no murmur, click, rub or gallop, regular rate and rhythm  Abdominal: soft, non-tender; bowel sounds normal; no masses,  no organomegaly, chest tubes in place  Extremities: extremities normal, atraumatic, no cyanosis or edema        Medicines:           Lab Results   Component Value Date    WBC 15.3 (H) 2024    HGB 11.1 (L) 2024    HCT 32.4 (L) 2024    .0 (L) 2024    CREATSERUM 0.80 2024    BUN 17 2024     2024    K 3.9 2024     (H) 2024    CO2 24.0 2024     (H) 2024    CA 8.1 (L) 2024    ALB 3.7 2023    ALKPHO 74 2023    BILT 0.5 2023    TP 7.4 2023    AST 21 2023    ALT 26 2023    PTT 37.3 (H) 2024    INR 1.50 (H) 2024    TSH 2.503 2024    PSA 4.0 2017    DDIMER >20.00 (H)  03/21/2024    MG 1.9 03/22/2024       XR CHEST AP PORTABLE  (CPT=71045)    Result Date: 3/22/2024  PROCEDURE: XR CHEST AP PORTABLE  (CPT=71045) TIME: 642  COMPARISON: [facility], XR CHEST AP PORTABLE (CPT=71045), 3/21/2024, 12:33 PM.  [facility], CTA CHEST+CTA ABDOMEN DISSECT SET (CPT=71275/61455), 3/21/2024, 11:44 AM.  [facility], XR CHEST AP PORTABLE (CPT=71045), 3/21/2024, 8:25 PM.  INDICATIONS: Follow up open heart surgery.  TECHNIQUE:   Single view.   Findings and impression:  ETT 5.5 cm above the christiano enteric tube beneath the diaphragm  PA catheter is in the right ventricle, similar to prior  Mediastinal and pericardial drain in place.   Normal heart size.  Diffuse widening of the upper mediastinum is unchanged from immediate postoperative image, not present on preoperative imaging.  This may represent hematoma  There is no pulmonary edema  No pneumothorax or large effusion  Minimal atelectasis at the bases      Dictated by (CST): Sebastian Alicea MD on 3/22/2024 at 8:21 AM     Finalized by (CST): Sebastian Alicea MD on 3/22/2024 at 8:27 AM          XR CHEST AP PORTABLE  (CPT=71045)    Result Date: 3/21/2024  CONCLUSION:   Postoperative changes involving the thoracic aorta with multiple support devices, which are described in detail above.  Mild pulmonary venous congestion.  No focal opacity, pleural effusion, or pneumothorax.   Dictated by (CST): William Brown MD on 3/21/2024 at 9:00 PM     Finalized by (CST): William Brown MD on 3/21/2024 at 9:05 PM         EKG 12 Lead    Result Date: 3/22/2024  Sinus rhythm with 1st degree A-V block Right bundle branch block Abnormal ECG When compared with ECG of 21-MAR-2024 20:11, No significant change was found Confirmed by DINESH LOPEZ JORDAN ([id number]) on 3/22/2024 8:02:23 AM    EKG 12 Lead    Result Date: 3/22/2024  Sinus rhythm with marked sinus arrythmia with 1st degree A-V block with occasional ventricular-paced  complexes Right bundle branch block Abnormal ECG When compared with ECG of 21-MAR-2024 23:33, Electronic ventricular pacemaker has replaced Sinus rhythm Confirmed by DINESH LOPEZ JORDAN ([id number]) on 3/22/2024 8:01:57 AM    EKG 12 Lead    Result Date: 3/22/2024  Sinus rhythm with 1st degree A-V block Right bundle branch block Abnormal ECG When compared with ECG of 21-MAR-2024 20:14, No significant change was found Confirmed by DINESH LOPEZ JORDAN ([id number]) on 3/22/2024 8:00:45 AM     Results:     CBC:    Lab Results   Component Value Date    WBC 15.3 (H) 03/23/2024    WBC 9.9 03/22/2024    WBC 12.0 (H) 03/21/2024     Lab Results   Component Value Date    HGB 11.1 (L) 03/23/2024    HGB 11.3 (L) 03/22/2024    HGB 9.0 (L) 03/21/2024      Lab Results   Component Value Date    .0 (L) 03/23/2024    .0 (L) 03/22/2024    .0 (L) 03/21/2024       Recent Labs   Lab 03/22/24  0359 03/22/24  1455 03/22/24  1601 03/23/24  0501   *  --  115* 135*   BUN 18  --  13 17   CREATSERUM 0.88  --  0.69* 0.80   CA 7.7*  --  7.4* 8.1*     --  138 142   K 3.8 5.7* 4.2 3.9   *  --  112 117*   CO2 25.0  --  23.0 24.0             Assessment and Plan:       Dissection of thoracoabdominal aorta (HCC)   S/p aortic dissection repair with 30 mm x 32 mm Hemashield graft secondary to 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 (acute type a aortic dissection) -POD #2  On dobutamine and clevidipine per cardiology   On metoprolol and asa   Holding off on extubation due to mentation   Pericardial drain in place - monitor output  Chest tube - monitor output   Maintain bp less then 130     Thrombocytopenia - monitor cbc daily     DVT ppx lovenox            Keya Workman DO         Chart reviewed, including current vitals, notes, labs and imaging  Labs ordered and medications adjusted as outlined above  Coordinate care with care team/consultants  Discussed with patient results of tests, management plan as  outlined above, and the need for ongoing hospitalization  D/w RN     [facility] high          PHYSICIAN Certification of Need for Inpatient Hospitalization - Initial Certification    Patient will require inpatient services that will reasonably be expected to span two midnight's based on the clinical documentation in H+P.   Based on patients current state of illness, I anticipate that, after discharge, patient will require TBD.

## 2024-03-23 NOTE — PLAN OF CARE
Problem: RESPIRATORY - ADULT  Goal: Achieves optimal ventilation and oxygenation  Description: INTERVENTIONS:  - Assess for changes in respiratory status  - Assess for changes in mentation and behavior  - Position to facilitate oxygenation and minimize respiratory effort  - Oxygen supplementation based on oxygen saturation or ABGs  - Provide Smoking Cessation handout, if applicable  - Encourage broncho-pulmonary hygiene including cough, deep breathe, Incentive Spirometry  - Assess the need for suctioning and perform as needed  - Assess and instruct to report SOB or any respiratory difficulty  - Respiratory Therapy support as indicated  - Manage/alleviate anxiety  - Monitor for signs/symptoms of CO2 retention  Outcome: Progressing   Received intubated pt on the following vent settings: VC/AC 12, 600, +5, 50%.  FiO2 weaned to 30% per bedside SpO2, pt tolerating well. No SBT performed d/t pt unable to follow commands off sedation. ETT remains secured at 23cm at the lip. RT to continue to monitor.

## 2024-03-23 NOTE — PLAN OF CARE
Restraints for pt safety, pt agitated off sedation and attempting to pull ET tube, pt assessed q 2 hr, no injury noted, family and patient aware of safety plan.       Problem: Safety Risk - Non-Violent Restraints  Goal: Patient will remain free from self-harm  Description: INTERVENTIONS:  - Apply the least restrictive restraint to prevent harm  - Notify patient and family of reasons restraints applied  - Assess for any contributing factors to confusion (electrolyte disturbances, delirium, medications)  - Discontinue any unnecessary medical devices as soon as possible  - Assess the patient's physical comfort, circulation, skin condition, hydration, nutrition and elimination needs   - Reorient and redirection as needed  - Assess for the need to continue restraints  Outcome: Not Progressing

## 2024-03-23 NOTE — PROGRESS NOTES
[facility]  part of [facility]     Progress Note    Talib Gonzalez Patient Status:  Inpatient    1950 MRN [id number]   Location [facility] 2W/SW Attending Keya Workman DO   Hosp Day # 2 PCP No primary care provider on file.       Subjective:   Seen and examined.  Intubated, sedated.  Moving all extremities but not following commands.    Objective:   Blood pressure 102/55, pulse 70, temperature 98.3 °F (36.8 °C), temperature source Pulmonary Artery, resp. rate 18, height 6' (1.829 m), weight 240 lb 8.4 oz (109.1 kg), SpO2 99%.  Intake/Output:   Last 3 shifts: I/O last 3 completed shifts:  In: 8376.2 [I.V.:5426.2; Blood:2250; IV PIGGYBACK:700]  Out: 3588 [Urine:2540; Emesis/NG output:350; Chest Tube:698]   This shift: I/O this shift:  In: 100 [IV PIGGYBACK:100]  Out: 50 [Urine:50]     Vent Settings: Vent Mode: CPAP;PS  FiO2 (%):  [30 %] 30 %  S RR:  [12] 12  S VT:  [600 mL] 600 mL  PEEP/CPAP (cm H2O):  [5 cm H20] 5 cm H20  MAP (cm H2O):  [5.1-8.8] 6.9    Hemodynamic parameters (last 24 hours): PAP: (15-27)/(-3-4) 22/0  CO:  [4.2 L/min-6.9 L/min] 6.9 L/min  CI:  [1.9 L/min/m2-3.1 L/min/m2] 3.1 L/min/m2    Scheduled Meds:         Continuous Infusions:    dextrose 5%-sodium chloride 0.9% 40 mL/hr (24 0500)    DOBUTamine Stopped (24 0900)    nitroGLYCERIN in dextrose 5% Stopped (24 1700)    norepinephrine      dexmedetomidine 0.5 mcg/kg/hr (24 0919)    insulin regular Stopped (24 1000)    clevidipine 2 mg/hr (24 0515)    fentanyl      propofol Stopped (24 0900)       Physical Exam  Constitutional: no acute distress, intubated, sedated  Eyes: PERRL  ENT: nares pateint  Neck: supple, no JVD  Cardio: RRR, S1 S2  Respiratory: Basilar crackles  GI: abdomen soft, non tender, active bowel sounds, no organomegaly  Extremities: no clubbing, cyanosis, edema  Neurologic: no gross motor deficits  Skin: warm, dry      Results:     Lab Results    Component Value Date    WBC 15.3 03/23/2024    HGB 11.1 03/23/2024    HCT 32.4 03/23/2024    .0 03/23/2024    CREATSERUM 0.80 03/23/2024    BUN 17 03/23/2024     03/23/2024    K 3.9 03/23/2024     03/23/2024    CO2 24.0 03/23/2024     03/23/2024    CA 8.1 03/23/2024    MG 1.9 03/22/2024       XR CHEST AP PORTABLE  (CPT=71045)    Result Date: 3/22/2024  PROCEDURE: XR CHEST AP PORTABLE  (CPT=71045) TIME: 642  COMPARISON: [facility], XR CHEST AP PORTABLE (CPT=71045), 3/21/2024, 12:33 PM.  [facility], CTA CHEST+CTA ABDOMEN DISSECT SET (CPT=71275/81772), 3/21/2024, 11:44 AM.  [facility], XR CHEST AP PORTABLE (CPT=71045), 3/21/2024, 8:25 PM.  INDICATIONS: Follow up open heart surgery.  TECHNIQUE:   Single view.   Findings and impression:  ETT 5.5 cm above the christiano enteric tube beneath the diaphragm  PA catheter is in the right ventricle, similar to prior  Mediastinal and pericardial drain in place.   Normal heart size.  Diffuse widening of the upper mediastinum is unchanged from immediate postoperative image, not present on preoperative imaging.  This may represent hematoma  There is no pulmonary edema  No pneumothorax or large effusion  Minimal atelectasis at the bases      Dictated by (CST): Sebastian Alicea MD on 3/22/2024 at 8:21 AM     Finalized by (CST): Sebastian Alicea MD on 3/22/2024 at 8:27 AM          XR CHEST AP PORTABLE  (CPT=71045)    Result Date: 3/21/2024  CONCLUSION:   Postoperative changes involving the thoracic aorta with multiple support devices, which are described in detail above.  Mild pulmonary venous congestion.  No focal opacity, pleural effusion, or pneumothorax.   Dictated by (CST): William Brown MD on 3/21/2024 at 9:00 PM     Finalized by (CST): William Brown MD on 3/21/2024 at 9:05 PM          XR CHEST AP PORTABLE  (CPT=71045)    Result Date: 3/21/2024  PROCEDURE: XR CHEST AP PORTABLE  (CPT=71045) TIME: 1233   COMPARISON: None.  INDICATIONS: Central Line Placement  TECHNIQUE:   Single view.   Findings and impression:  Right IJ catheter in the mid SVC  Normal heart size with no edema  Lungs are clear  Normal pleura  No free air     Dictated by (CST): Sebastian Alicea MD on 3/21/2024 at 12:52 PM     Finalized by (CST): Sebastian Alicea MD on 3/21/2024 at 12:54 PM          CTA CHEST+CTA ABDOMEN DISSECT SET (CPT=71275/82694)    Result Date: 3/21/2024  CONCLUSION:  Massive type A dissection extending from the aortic root to the bilateral common iliac arteries.  The dissection extends into the aortic arch great vessels, celiac axis, and SMA.  99% stenosis with near complete occlusion involving the proximal right common carotid artery and majority of the SMA.  90% stenosis involving the left proximal common carotid artery.  Mild wall thickening of the ascending colon may be secondary to early changes of ischemia.  No pneumatosis or pneumoperitoneum.  Critical results were communicated to Dr. Maurer on 3/21/2024 at 1211 hours   Dictated by (CST): Silverio Rahman MD on 3/21/2024 at 12:07 PM     Finalized by (CST): Silverio Rahman MD on 3/21/2024 at 12:20 PM          CT ABDOMEN+PELVIS(CONTRAST ONLY)(CPT=74177)    Result Date: 3/21/2024  CONCLUSION:  1. Extensive aortic dissection, which extends from the imaged aortic root throughout descending thoracic and abdominal aorta as well as into the right greater than left common iliac arteries.  Notably, the dissection flap extends into the superior mesenteric artery and this results in severe long segment SMA stenosis.  More distal SMA branches are patent.  However, some of the proximal right SMA branches are not definitively opacified.  There is also suspected long segment ascending colonic wall thickening with mild surrounding inflammatory stranding and abnormal fluid in the proximal colon.  These findings raise suspicion for early ascending colonic ischemia.  Suggest a follow-up/completion CT  angiogram of the chest for complete evaluation.  Vascular surgery assessment is also recommended.  Findings were discussed with Dr. Maurer at the time of dictation. 2. No definite pneumatosis intestinalis, portal venous gas, free intraperitoneal air, or well-defined/drainable intra-abdominal collection. 3. Colonic diverticulosis.  Mild rectosigmoid fecal impaction. 4. Slab a below small hepatic cyst. 5. Probable contiguous left adrenal adenomas. 6. Coronary and peripheral atherosclerosis. 7. Mild prostatomegaly. 8. Lesser incidental findings as above.   elm-remote  Dictated by (CST): Saúl Isbell MD on 3/21/2024 at 11:13 AM     Finalized by (CST): Saúl Isbell MD on 3/21/2024 at 11:35 AM           EKG 12 Lead    Result Date: 3/22/2024  Sinus rhythm with 1st degree A-V block Right bundle branch block Abnormal ECG When compared with ECG of 21-MAR-2024 20:11, No significant change was found Confirmed by DINESH LOPEZ JORDAN ([id number]) on 3/22/2024 8:02:23 AM    EKG 12 Lead    Result Date: 3/22/2024  Sinus rhythm with marked sinus arrythmia with 1st degree A-V block with occasional ventricular-paced complexes Right bundle branch block Abnormal ECG When compared with ECG of 21-MAR-2024 23:33, Electronic ventricular pacemaker has replaced Sinus rhythm Confirmed by DINESH LOPEZ JORDAN ([id number]) on 3/22/2024 8:01:57 AM    EKG 12 Lead    Result Date: 3/22/2024  Sinus rhythm with 1st degree A-V block Right bundle branch block Abnormal ECG When compared with ECG of 21-MAR-2024 20:14, No significant change was found Confirmed by DINESH LOPEZ JORDAN ([id number]) on 3/22/2024 8:00:45 AM    EKG 12 Lead    Result Date: 3/21/2024  Marked sinus bradycardia with marked sinus arrythmia with 1st degree A-V block Right bundle branch block Abnormal ECG When compared with ECG of 21-MAR-2024 09:02, No significant change was found Confirmed by DINESH LOPEZ JORDAN ([id number]) on 3/21/2024 4:40:59 PM     Assessment   1.  Type A aortic  dissection POD #2 status post repair  2.  Acute respiratory failure  3.  Carotid stenosis  4.  Anemia  5.  Thrombocytopenia     Plan   -Patient presents with evidence of sudden onset abdominal discomfort with some generalized malaise earlier this morning.  No significant chest pain or back pain per patient.  -CT abdomen pelvis and subsequent CT chest abdomen pelvis with evidence of extensive type B dissection extending from aortic root to bilateral common iliac arteries.  Mild wall thickening of the ascending colon secondary to changes likely secondary to early ischemia.  -Status post emergent repair of type A dissection on 3/21/2024.  -Spontaneous breathing trial as tolerated but will hold off extubation given patient not clearly following commands but does move all extremities.  Continue close neuromonitoring.    -Wean nitroglycerin and Cleviprex as tolerated  -Close hemodynamic monitoring  -Closely monitor hemoglobin  -DVT prophylaxis: SCDs, Lovenox  -Discussed with family    Ruddy Rubi, DO  Pulmonary Critical Care Medicine  [facility]

## 2024-03-23 NOTE — PLAN OF CARE
Ongoing management s/p AAA repair. Continues to have goal SBP <120. Currently requiring cleviprex, nitroglycerin not used at this time. Also continued use of Dobutamine to maintain goal CI>2. Remains sedated on Propofol and Precedex, sedation level lighter tonight, winces to pain, (+) cough/gag. Plan to reattempt SAT on day shift again. Vent settings unchanged. Pacer settings adjusted as pt continues to have inappropriate sensing and pacing. Underlying rhythm of SR/SB with PACs & PVCs w/ sinus pauses. Discussed current GOC with patient's wife and daughters.     Total shift urine output = 495  [place] drain CT output = 50  Pleural Y CT output = 40      Addendum: 0345 - patient coughed and woke up, tracked RN around the room with encouragement. Was not able to squeeze hands or follow further direction but moved all extremities. Wincing in pain - IV acetaminophen given.      Problem: Patient Centered Care  Goal: Patient preferences are identified and integrated in the patient's plan of care  Description: Interventions:  - What would you like us to know as we care for you? From home with spouse Lucie, generally healthy overall  - Provide timely, complete, and accurate information to patient/family  - Incorporate patient and family knowledge, values, beliefs, and cultural backgrounds into the planning and delivery of care  - Encourage patient/family to participate in care and decision-making at the level they choose  - Honor patient and family perspectives and choices  Outcome: Progressing     Problem: Patient/Family Goals  Goal: Patient/Family Long Term Goal  Description: Patient's Long Term Goal: fully recover and return home with wife and family    Interventions:  - discharge planning when appropriate  - safe management during post-op period  - See additional Care Plan goals for specific interventions  Outcome: Progressing  Goal: Patient/Family Short Term Goal  Description: Patient's Short Term Goal: recover in  immediate post-op period, safe BP and hemodynamic stability - goals discussed w/ wife at bedside while patient intubated/sedated    Interventions:   - maintain intubation/sedation overnight for POD#0  - continue monitoring via swan/aimee throughout POD#0-1  - See additional Care Plan goals for specific interventions  Outcome: Progressing     Problem: CARDIOVASCULAR - ADULT  Goal: Maintains optimal cardiac output and hemodynamic stability  Description: INTERVENTIONS:  - Monitor vital signs, rhythm, and trends  - Monitor for bleeding, hypotension and signs of decreased cardiac output  - Evaluate effectiveness of vasoactive medications to optimize hemodynamic stability  - Monitor arterial and/or venous puncture sites for bleeding and/or hematoma  - Assess quality of pulses, skin color and temperature  - Assess for signs of decreased coronary artery perfusion - ex. Angina  - Evaluate fluid balance, assess for edema, trend weights  Outcome: Progressing  Goal: Absence of cardiac arrhythmias or at baseline  Description: INTERVENTIONS:  - Continuous cardiac monitoring, monitor vital signs, obtain 12 lead EKG if indicated  - Evaluate effectiveness of antiarrhythmic and heart rate control medications as ordered  - Initiate emergency measures for life threatening arrhythmias  - Monitor electrolytes and administer replacement therapy as ordered  Outcome: Progressing     Problem: RESPIRATORY - ADULT  Goal: Achieves optimal ventilation and oxygenation  Description: INTERVENTIONS:  - Assess for changes in respiratory status  - Assess for changes in mentation and behavior  - Position to facilitate oxygenation and minimize respiratory effort  - Oxygen supplementation based on oxygen saturation or ABGs  - Provide Smoking Cessation handout, if applicable  - Encourage broncho-pulmonary hygiene including cough, deep breathe, Incentive Spirometry  - Assess the need for suctioning and perform as needed  - Assess and instruct to report SOB  or any respiratory difficulty  - Respiratory Therapy support as indicated  - Manage/alleviate anxiety  - Monitor for signs/symptoms of CO2 retention  Outcome: Progressing     Problem: GASTROINTESTINAL - ADULT  Goal: Minimal or absence of nausea and vomiting  Description: INTERVENTIONS:  - Maintain adequate hydration with IV or PO as ordered and tolerated  - Nasogastric tube to low intermittent suction as ordered  - Evaluate effectiveness of ordered antiemetic medications  - Provide nonpharmacologic comfort measures as appropriate  - Advance diet as tolerated, if ordered  - Obtain nutritional consult as needed  - Evaluate fluid balance  Outcome: Progressing  Goal: Maintains or returns to baseline bowel function  Description: INTERVENTIONS:  - Assess bowel function  - Maintain adequate hydration with IV or PO as ordered and tolerated  - Evaluate effectiveness of GI medications  - Encourage mobilization and activity  - Obtain nutritional consult as needed  - Establish a toileting routine/schedule  - Consider collaborating with pharmacy to review patient's medication profile  Outcome: Progressing     Problem: METABOLIC/FLUID AND ELECTROLYTES - ADULT  Goal: Hemodynamic stability and optimal renal function maintained  Description: INTERVENTIONS:  - Monitor labs and assess for signs and symptoms of volume excess or deficit  - Monitor intake, output and patient weight  - Monitor urine specific gravity, serum osmolarity and serum sodium as indicated or ordered  - Monitor response to interventions for patient's volume status, including labs, urine output, blood pressure (other measures as available)  - Encourage oral intake as appropriate  - Instruct patient on fluid and nutrition restrictions as appropriate  Outcome: Progressing     Problem: HEMATOLOGIC - ADULT  Goal: Maintains hematologic stability  Description: INTERVENTIONS  - Assess for signs and symptoms of bleeding or hemorrhage  - Monitor labs and vital signs for  trends  - Administer supportive blood products/factors, fluids and medications as ordered and appropriate  - Administer supportive blood products/factors as ordered and appropriate  Outcome: Progressing

## 2024-03-23 NOTE — PROGRESS NOTES
Cardiology Progress Note    Talib Gonzalez Patient Status:  Inpatient    1950 MRN [id number]   Location [facility] 2W/SW Attending Keya Workman,    Hosp Day # 2 PCP No primary care provider on file.     Interval Note:  Patient postop day 1 from emergent repair of ruptured descending thoracic aortic aneurysm with Hemashield graft.  Currently on dobutamine and clevidipine infusions  Currently intubated-concern for altered mental status      --------------------------------------------------------------------------------------------------------------------------------  ROS 12 systems reviewed, pertinent findings above.  ROS    History:  Past Medical History:   Diagnosis Date    Anxiety     Smoker      History reviewed. No pertinent surgical history.  No family history on file.   reports that he has been smoking cigarettes. He does not have any smokeless tobacco history on file.    Objective:   Temp: 98.3 °F (36.8 °C)  Pulse: 76  Resp: 19  BP: 110/59  AO: 128/48  FiO2 (%): 30 %    Intake/Output:     Intake/Output Summary (Last 24 hours) at 3/23/2024 1005  Last data filed at 3/23/2024 0700  Gross per 24 hour   Intake 2284.26 ml   Output 1295 ml   Net 989.26 ml       Physical Exam:    General: Intubated  HEENT: Normocephalic, anicteric sclera, neck supple.  Neck: No JVD, carotids 2+, no bruits.  Cardiac: Regular rate and rhythm. S1, S2 normal. No murmur, pericardial rub, S3.  Lungs: Clear without wheezes, rales, rhonchi or dullness.  Normal excursions and effort.  Abdomen: Soft, non-tender. BS-present.  Extremities: Without clubbing, cyanosis or edema.  Peripheral pulses are 2+.  Neurologic: Non-focal  Skin: Warm and dry.       Assessment   Acute type A thoracic aortic dissection status postrepair 2024  Ventilator dependent postop  Shock-recovered  Altered mental status      Plan  Patient luis in ICU in critical condition.  After emergent repair of type a dissection.  Patient currently on  clevidipine and dobutamine to maintain cardiac output and vasodilate.  Continue metoprolol, aspirin  Infusion and line management per critical care and CT surgery    Thank you for allowing me to take part in the care of Talib Carlos. Please call with any questions of concerns.      Level of care: L3    Marcos Garcia DO  [facility]   Interventional Cardiac and Vascular Services      Marcos Garcia DO  March 23, 2024  10:05 AM

## 2024-03-23 NOTE — PROGRESS NOTES
Patient received on full vent support of AC/12/600/+5/30%, ET tube 7.5 secured at 23 @ the lips. PS trial 5/5, 30% done for 40 min. Pt.back on full support due to no plan to extubate today.    ABG result and parameters are below:      03/23/24 1050   Spontaneous Parameters   Spontaneous RR Rate 15   Spontaneous Minute Volume 11   Average Spontaneous Tidal Volume 657   $ Spontaneous Vital Capacity 1.9   Negative Inspiratory Force 35   Total RSBI 20        Latest Reference Range & Units 03/23/24 11:14   ABG PH 7.35 - 7.45  7.50 (H)   ABG PCO2 35 - 45 mm Hg 33 (L)   ABG PO2 80 - 100 mm Hg 85   ABG HCO3 21.0 - 27.0 mEq/L 26.9   ABG O2 SATURATION 94.0 - 100.0 % 95.9   Blood Gas Base Excess -2.0 - 2.0 mmol/L 2.5 (H)   TOTAL HEMOGLOBIN 13.0 - 17.5 g/dL 8.2 (L)   METHEMOGLOBIN 0.4 - 1.5 % SAT 0.2 (L)   POTASSIUM BLOOD GAS 3.6 - 5.1 mmol/L 3.8   SODIUM BLOOD  - 145 mmol/L 141   Lactic Acid (Blood Gas) 0.5 - 2.0 mmol/L 1.1   Oxygen Delivery Device  Vent   Blood Gas Vent Mode  CPAP   Oxygen Content 15.0 - 23.0 Vol% 11.1 (L)   CARBOXYHEMOGLOBIN 0.0 - 3.0 % 1.5   FiO2  30.00   IONIZED CALCIUM 0.95 - 1.32 mmol/L 1.15   SAMPLE SITE  Arterial Line   PEEP cm H2O 5.0   PRESSURE SUPPORT cm H2O 5.0

## 2024-03-23 NOTE — CM/SW NOTE
Reservation made to [facility] per pt spouse.    SW/CM to remain available for support and/or discharge planning.      LOULOU Manriquez, LSW  Social Work   Ext:#[phone]

## 2024-03-23 NOTE — PROGRESS NOTES
[facility]  part of [facility]    Progress Note    Talib Gonzalez Patient Status:  Inpatient    1950 MRN [id number]   Location [facility] 2W/SW Attending Keya Workman DO   Hosp Day # 2 PCP No primary care provider on file.     Subjective:  Patient seen in bed, intubated on full support and sedated. Moving all four extremities, not to command. Attempting awakening trial. Wife and daughters bedside.    Objective:  /55 (BP Location: Right arm)   Pulse 70   Temp 98.6 °F (37 °C) (Pulmonary Artery)   Resp 18   Ht 182.9 cm (6')   Wt 109.1 kg (240 lb 8.4 oz)   SpO2 97%   BMI 32.62 kg/m²     Temp (24hrs), Av.9 °F (37.2 °C), Min:98.3 °F (36.8 °C), Max:99.3 °F (37.4 °C)  Telemetry NSR    Intake/Output:    Intake/Output Summary (Last 24 hours) at 3/23/2024 1127  Last data filed at 3/23/2024 0700  Gross per 24 hour   Intake 2189.5 ml   Output 1230 ml   Net 959.5 ml       Wt Readings from Last 3 Encounters:   24 109.1 kg (240 lb 8.4 oz)       Allergies:  Allergies   Allergen Reactions    Serotonin Reuptake Inhibitors DIARRHEA       Labs:  Lab Results   Component Value Date    WBC 15.3 2024    HGB 11.1 2024    HCT 32.4 2024    .0 2024    CREATSERUM 0.80 2024    BUN 17 2024     2024    K 3.9 2024     2024    CO2 24.0 2024     2024    CA 8.1 2024    MG 1.9 2024       Physical Exam:  Blood pressure 102/55, pulse 70, temperature 98.6 °F (37 °C), temperature source Pulmonary Artery, resp. rate 18, height 182.9 cm (6'), weight 109.1 kg (240 lb 8.4 oz), SpO2 97%.  General: intubated and sedated  Neck: IVAN d/t body habitus   Lungs: CTA bilat  Heart: RRR, S1, S2  Abdomen: Soft, mild distention, hypoactive BS, OG to LIS  Extremities: Warm, dry, no LE edema bilat  Pulses: 1+ bilat DP  Skin: sternotomy stable, incision CDI  Neurological:  sedated, on SAT follows commands  and nods appropriately    Assessment/Plan:  S/p aortic dissection repair with 30 mm x 32 mm Hemashield graft secondary to 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 (acute type a aortic dissection) -POD #2  Wean vent/extubate-management per pulmonary, CXR reviewed, repeat in AM  Pain meds as needed, use IV Tylenol, limit narcotics with SAT/SBT trial/assess mental status  Currently hemodynamically stable on low-dose Dobutamine and Cleviprex- wean off as tolerated. DC [place] once stable off dobutamine. Maintain goal SBP less than 130.   Increase activity once extubated-OOB to chair/ambulate, likely PT/OT consult  DVT prevention - SCDs/Lovenox  Expected postop anemia - mild/stable, consider starting Iron if Hgb <10, currently Hgb stable 11.1- received multiple products in OR  Expected postop volume overload - monitor daily weights, I/Os, lasix twice daily  Insulin coverage per protocol  Thrombocytopenia-as expected-monitor CBC, hold ASA today, can resume if platelets do not continue to downtrend.  Hipa sent     Discharge planning:  Patient lives at home with wife, has supportive family.  Anticipate DC home once medically stable.  Will continue to assess as he progresses.     Discussed with family, RN and rounding CV Surgeon Dr. Anu MOTTA, RN  3/23/2024  11:27 AM

## 2024-03-23 NOTE — PLAN OF CARE
Pt alert and following commands off sedation, anxiety noted and pt able to nod in pain, VSS, dobutamine and swan dc'ed, insulin/propofol/precedex/cleviprex con't, two chest tubes present, echavarria intact.     Total CT output:  Mediastinal- 10  Pericardial- 30    Echavarria output x 8 hrs- 450 cc  Problem: Patient/Family Goals  Goal: Patient/Family Long Term Goal  Description: Patient's Long Term Goal: fully recover and return home with wife and family    Interventions:  - discharge planning when appropriate  - safe management during post-op period  - See additional Care Plan goals for specific interventions  Outcome: Progressing  Goal: Patient/Family Short Term Goal  Description: Patient's Short Term Goal: recover in immediate post-op period, safe BP and hemodynamic stability - goals discussed w/ wife at bedside while patient intubated/sedated    Interventions:   - maintain intubation/sedation overnight for POD#0  - continue monitoring via swan/aimee throughout POD#0-1  - See additional Care Plan goals for specific interventions  Outcome: Progressing     Problem: CARDIOVASCULAR - ADULT  Goal: Maintains optimal cardiac output and hemodynamic stability  Description: INTERVENTIONS:  - Monitor vital signs, rhythm, and trends  - Monitor for bleeding, hypotension and signs of decreased cardiac output  - Evaluate effectiveness of vasoactive medications to optimize hemodynamic stability  - Monitor arterial and/or venous puncture sites for bleeding and/or hematoma  - Assess quality of pulses, skin color and temperature  - Assess for signs of decreased coronary artery perfusion - ex. Angina  - Evaluate fluid balance, assess for edema, trend weights  Outcome: Progressing     Problem: RESPIRATORY - ADULT  Goal: Achieves optimal ventilation and oxygenation  Description: INTERVENTIONS:  - Assess for changes in respiratory status  - Assess for changes in mentation and behavior  - Position to facilitate oxygenation and minimize respiratory  effort  - Oxygen supplementation based on oxygen saturation or ABGs  - Provide Smoking Cessation handout, if applicable  - Encourage broncho-pulmonary hygiene including cough, deep breathe, Incentive Spirometry  - Assess the need for suctioning and perform as needed  - Assess and instruct to report SOB or any respiratory difficulty  - Respiratory Therapy support as indicated  - Manage/alleviate anxiety  - Monitor for signs/symptoms of CO2 retention  Outcome: Progressing     Problem: METABOLIC/FLUID AND ELECTROLYTES - ADULT  Goal: Hemodynamic stability and optimal renal function maintained  Description: INTERVENTIONS:  - Monitor labs and assess for signs and symptoms of volume excess or deficit  - Monitor intake, output and patient weight  - Monitor urine specific gravity, serum osmolarity and serum sodium as indicated or ordered  - Monitor response to interventions for patient's volume status, including labs, urine output, blood pressure (other measures as available)  - Encourage oral intake as appropriate  - Instruct patient on fluid and nutrition restrictions as appropriate  Outcome: Progressing     Problem: HEMATOLOGIC - ADULT  Goal: Maintains hematologic stability  Description: INTERVENTIONS  - Assess for signs and symptoms of bleeding or hemorrhage  - Monitor labs and vital signs for trends  - Administer supportive blood products/factors, fluids and medications as ordered and appropriate  - Administer supportive blood products/factors as ordered and appropriate  Outcome: Progressing     Problem: SAFETY ADULT - FALL  Goal: Free from fall injury  Description: INTERVENTIONS:  - Assess pt frequently for physical needs  - Identify cognitive and physical deficits and behaviors that affect risk of falls.  - [place] fall precautions as indicated by assessment.  - Educate pt/family on patient safety including physical limitations  - Instruct pt to call for assistance with activity based on assessment  - Modify  environment to reduce risk of injury  - Provide assistive devices as appropriate  - Consider OT/PT consult to assist with strengthening/mobility  - Encourage toileting schedule  Outcome: Progressing     Problem: DISCHARGE PLANNING  Goal: Discharge to home or other facility with appropriate resources  Description: INTERVENTIONS:  - Identify barriers to discharge w/pt and caregiver  - Include patient/family/discharge partner in discharge planning  - Arrange for needed discharge resources and transportation as appropriate  - Identify discharge learning needs (meds, wound care, etc)  - Arrange for interpreters to assist at discharge as needed  - Consider post-discharge preferences of patient/family/discharge partner  - Complete POLST form as appropriate  - Assess patient's ability to be responsible for managing their own health  - Refer to Case Management Department for coordinating discharge planning if the patient needs post-hospital services based on physician/LIP order or complex needs related to functional status, cognitive ability or social support system  Outcome: Not Progressing

## 2024-03-24 ENCOUNTER — APPOINTMENT (OUTPATIENT)
Dept: GENERAL RADIOLOGY | Facility: HOSPITAL | Age: 74
End: 2024-03-24
Attending: THORACIC SURGERY (CARDIOTHORACIC VASCULAR SURGERY)
Payer: MEDICARE

## 2024-03-24 ENCOUNTER — APPOINTMENT (OUTPATIENT)
Dept: GENERAL RADIOLOGY | Facility: HOSPITAL | Age: 74
DRG: 219 | End: 2024-03-24
Attending: THORACIC SURGERY (CARDIOTHORACIC VASCULAR SURGERY)
Payer: MEDICARE

## 2024-03-24 LAB
ANION GAP SERPL CALC-SCNC: 5 MMOL/L (ref 0–18)
BASOPHILS # BLD AUTO: 0.02 X10(3) UL (ref 0–0.2)
BASOPHILS NFR BLD AUTO: 0.1 %
BLOOD TYPE BARCODE: 1700
BLOOD TYPE BARCODE: 6200
BLOOD TYPE BARCODE: 7300
BUN BLD-MCNC: 20 MG/DL (ref 9–23)
BUN/CREAT SERPL: 31.7 (ref 10–20)
CALCIUM BLD-MCNC: 7.9 MG/DL (ref 8.7–10.4)
CHLORIDE SERPL-SCNC: 117 MMOL/L (ref 98–112)
CO2 SERPL-SCNC: 24 MMOL/L (ref 21–32)
CREAT BLD-MCNC: 0.63 MG/DL
DEPRECATED RDW RBC AUTO: 51.1 FL (ref 35.1–46.3)
EGFRCR SERPLBLD CKD-EPI 2021: 100 ML/MIN/1.73M2 (ref 60–?)
EOSINOPHIL # BLD AUTO: 0.01 X10(3) UL (ref 0–0.7)
EOSINOPHIL NFR BLD AUTO: 0.1 %
ERYTHROCYTE [DISTWIDTH] IN BLOOD BY AUTOMATED COUNT: 14.7 % (ref 11–15)
GLUCOSE BLD-MCNC: 142 MG/DL (ref 70–99)
GLUCOSE BLDC GLUCOMTR-MCNC: 100 MG/DL (ref 70–99)
GLUCOSE BLDC GLUCOMTR-MCNC: 106 MG/DL (ref 70–99)
GLUCOSE BLDC GLUCOMTR-MCNC: 110 MG/DL (ref 70–99)
GLUCOSE BLDC GLUCOMTR-MCNC: 118 MG/DL (ref 70–99)
GLUCOSE BLDC GLUCOMTR-MCNC: 120 MG/DL (ref 70–99)
GLUCOSE BLDC GLUCOMTR-MCNC: 129 MG/DL (ref 70–99)
GLUCOSE BLDC GLUCOMTR-MCNC: 131 MG/DL (ref 70–99)
GLUCOSE BLDC GLUCOMTR-MCNC: 132 MG/DL (ref 70–99)
GLUCOSE BLDC GLUCOMTR-MCNC: 137 MG/DL (ref 70–99)
GLUCOSE BLDC GLUCOMTR-MCNC: 139 MG/DL (ref 70–99)
GLUCOSE BLDC GLUCOMTR-MCNC: 141 MG/DL (ref 70–99)
GLUCOSE BLDC GLUCOMTR-MCNC: 142 MG/DL (ref 70–99)
GLUCOSE BLDC GLUCOMTR-MCNC: 143 MG/DL (ref 70–99)
GLUCOSE BLDC GLUCOMTR-MCNC: 145 MG/DL (ref 70–99)
GLUCOSE BLDC GLUCOMTR-MCNC: 145 MG/DL (ref 70–99)
GLUCOSE BLDC GLUCOMTR-MCNC: 150 MG/DL (ref 70–99)
GLUCOSE BLDC GLUCOMTR-MCNC: 155 MG/DL (ref 70–99)
GLUCOSE BLDC GLUCOMTR-MCNC: 84 MG/DL (ref 70–99)
HCT VFR BLD AUTO: 31.4 %
HGB BLD-MCNC: 10.5 G/DL
IMM GRANULOCYTES # BLD AUTO: 0.09 X10(3) UL (ref 0–1)
IMM GRANULOCYTES NFR BLD: 0.6 %
LYMPHOCYTES # BLD AUTO: 1.48 X10(3) UL (ref 1–4)
LYMPHOCYTES NFR BLD AUTO: 9.7 %
MAGNESIUM SERPL-MCNC: 1.9 MG/DL (ref 1.6–2.6)
MCH RBC QN AUTO: 31.4 PG (ref 26–34)
MCHC RBC AUTO-ENTMCNC: 33.4 G/DL (ref 31–37)
MCV RBC AUTO: 94 FL
MONOCYTES # BLD AUTO: 1.26 X10(3) UL (ref 0.1–1)
MONOCYTES NFR BLD AUTO: 8.3 %
NEUTROPHILS # BLD AUTO: 12.37 X10 (3) UL (ref 1.5–7.7)
NEUTROPHILS # BLD AUTO: 12.37 X10(3) UL (ref 1.5–7.7)
NEUTROPHILS NFR BLD AUTO: 81.2 %
OSMOLALITY SERPL CALC.SUM OF ELEC: 307 MOSM/KG (ref 275–295)
PLATELET # BLD AUTO: 98 10(3)UL (ref 150–450)
PLATELETS.RETICULATED NFR BLD AUTO: 6.2 % (ref 0–7)
POTASSIUM SERPL-SCNC: 4 MMOL/L (ref 3.5–5.1)
Q-T INTERVAL: 426 MS
QRS DURATION: 132 MS
QTC CALCULATION (BEZET): 432 MS
R AXIS: 50 DEGREES
RBC # BLD AUTO: 3.34 X10(6)UL
SODIUM SERPL-SCNC: 146 MMOL/L (ref 136–145)
T AXIS: 1 DEGREES
TSI SER-ACNC: 0.64 MIU/ML (ref 0.55–4.78)
UNIT VOLUME: 284 ML
UNIT VOLUME: 286 ML
UNIT VOLUME: 319 ML
UNIT VOLUME: 75 ML
VENTRICULAR RATE: 62 BPM
WBC # BLD AUTO: 15.2 X10(3) UL (ref 4–11)

## 2024-03-24 PROCEDURE — 71045 X-RAY EXAM CHEST 1 VIEW: CPT | Performed by: THORACIC SURGERY (CARDIOTHORACIC VASCULAR SURGERY)

## 2024-03-24 PROCEDURE — 99233 SBSQ HOSP IP/OBS HIGH 50: CPT | Performed by: INTERNAL MEDICINE

## 2024-03-24 PROCEDURE — 99233 SBSQ HOSP IP/OBS HIGH 50: CPT | Performed by: HOSPITALIST

## 2024-03-24 RX ORDER — ASPIRIN 81 MG/1
81 TABLET, CHEWABLE ORAL DAILY
Status: DISCONTINUED | OUTPATIENT
Start: 2024-03-24 | End: 2024-03-28

## 2024-03-24 RX ORDER — AMIODARONE HYDROCHLORIDE 200 MG/1
200 TABLET ORAL
Status: DISCONTINUED | OUTPATIENT
Start: 2024-03-24 | End: 2024-03-28

## 2024-03-24 NOTE — PLAN OF CARE
Patient extubated, soft restraints removed. A&Ox3, calm and cooperative.     Problem: Safety Risk - Non-Violent Restraints  Goal: Patient will remain free from self-harm  Description: INTERVENTIONS:  - Apply the least restrictive restraint to prevent harm  - Notify patient and family of reasons restraints applied  - Assess for any contributing factors to confusion (electrolyte disturbances, delirium, medications)  - Discontinue any unnecessary medical devices as soon as possible  - Assess the patient's physical comfort, circulation, skin condition, hydration, nutrition and elimination needs   - Reorient and redirection as needed  - Assess for the need to continue restraints  Outcome: Completed

## 2024-03-24 NOTE — PLAN OF CARE
Problem: RESPIRATORY - ADULT  Goal: Achieves optimal ventilation and oxygenation  Description: INTERVENTIONS:  - Assess for changes in respiratory status  - Assess for changes in mentation and behavior  - Position to facilitate oxygenation and minimize respiratory effort  - Oxygen supplementation based on oxygen saturation or ABGs  - Provide Smoking Cessation handout, if applicable  - Encourage broncho-pulmonary hygiene including cough, deep breathe, Incentive Spirometry  - Assess the need for suctioning and perform as needed  - Assess and instruct to report SOB or any respiratory difficulty  - Respiratory Therapy support as indicated  - Manage/alleviate anxiety  - Monitor for signs/symptoms of CO2 retention  Outcome: Progressing     RESPIRATORY THERAPY MECHANICAL VENTILATION PROGRESS NOTE    Ventilator Weaning:  Patient meets criteria for weaning? yes Weaning was attempted yes using pressure support 5 cmH2O + PEEP 5 cmH2O. The patient tolerated well for 45 minutes. Patient was extubated to 6 liter nasal cannula. Patient appears comfortable with no signs of increased WOB or SOB. Suctioned minimal amount thick white secretions.      Current Ventilator Data:     03/24/24 0904   Spontaneous Breathing Trial   Spontaneous Breathing Trial Complete Y   Is the FiO2 <= 0.5? (titrated for sats 92-94%) Y   Is the PEEP <= 5? Y   Is the RSBI <=104 Y   Is the patient off pressor and narcotic / sedation drips? Y   Is the patient free of ventricular arrhythmias in the past 24 hours? Y   Is the patient's cough adequate? Y   Is the patient alert (neuro), able to follow commands? Y   Daily Screen Meets All Criteria Yes   Spontaneous Parameters   Sedation holiday (date) 03/24/24   Sedation holiday (time) 0905   Spontaneous RR Rate 16   Spontaneous Minute Volume 11   Average Spontaneous Tidal Volume 730   $ Spontaneous Vital Capacity 1.4   Negative Inspiratory Force -30   Total RSBI 15         Therapist recommendations:   ZENON  recommends Titrate nasal cannula as tolerated by patient.

## 2024-03-24 NOTE — PROGRESS NOTES
Notified by primary RN-Yolanda, patient was having pauses earlier in the evening.  Rhythm has now converted to atrial fibrillation-rate controlled.  Patient continues on Cleviprex for blood pressure control.

## 2024-03-24 NOTE — PLAN OF CARE
Pt alert and nods approp.He follows commands and does not reach for his ETT or lines.Restraints removed he nods understanding not to reach for his tubes.His wife is at the bedside and agrees .      Problem: Safety Risk - Non-Violent Restraints  Goal: Patient will remain free from self-harm  Description: INTERVENTIONS:  - Apply the least restrictive restraint to prevent harm  - Notify patient and family of reasons restraints applied  - Assess for any contributing factors to confusion (electrolyte disturbances, delirium, medications)  - Discontinue any unnecessary medical devices as soon as possible  - Assess the patient's physical comfort, circulation, skin condition, hydration, nutrition and elimination needs   - Reorient and redirection as needed  - Assess for the need to continue restraints  Outcome: Progressing

## 2024-03-24 NOTE — PROGRESS NOTES
[facility]  part of [facility]    Progress Note    Talib Gonzalez Patient Status:  Inpatient    1950 MRN [id number]   Location [facility] 2W/SW Attending Keya Workman DO   Hosp Day # 3 PCP No primary care provider on file.     Chief complaint aortic dissection     Subjective:   Talib Gonzalez is a(n) 73 year old male pt more awake. No c/o's     On ventilator. Unable to do ros     Objective:   Blood pressure 105/55, pulse 65, temperature 97.7 °F (36.5 °C), temperature source Temporal, resp. rate 16, height 6' (1.829 m), weight 239 lb 3.2 oz (108.5 kg), SpO2 100%.      Intake/Output Summary (Last 24 hours) at 3/24/2024 1402  Last data filed at 3/24/2024 1233  Gross per 24 hour   Intake 792 ml   Output 1145 ml   Net -353 ml       Patient Weight(s) for the past 336 hrs:   Weight   24 0600 239 lb 3.2 oz (108.5 kg)   24 0700 240 lb 8.4 oz (109.1 kg)   24 0208 216 lb 14.9 oz (98.4 kg)   24 1248 217 lb (98.4 kg)   24 1231 217 lb (98.4 kg)           General appearance: awake  Pulmonary:  clear to auscultation bilaterally  Cardiovascular: S1, S2 normal, no murmur, click, rub or gallop, regular rate and rhythm  Abdominal: soft, non-tender; bowel sounds normal; no masses,  no organomegaly, chest tubes in place  Extremities: extremities normal, atraumatic, no cyanosis or edema        Medicines:           Lab Results   Component Value Date    WBC 15.2 (H) 2024    HGB 10.5 (L) 2024    HCT 31.4 (L) 2024    PLT 98.0 (L) 2024    CREATSERUM 0.63 (L) 2024    BUN 20 2024     (H) 2024    K 4.0 2024     (H) 2024    CO2 24.0 2024     (H) 2024    CA 7.9 (L) 2024    ALB 3.7 2023    ALKPHO 74 2023    BILT 0.5 2023    TP 7.4 2023    AST 21 2023    ALT 26 2023    PTT 37.3 (H) 2024    INR 1.50 (H) 2024    TSH 0.643 2024    PSA  4.0 12/05/2017    DDIMER >20.00 (H) 03/21/2024    MG 1.9 03/24/2024       XR CHEST AP PORTABLE  (CPT=71045)    Result Date: 3/24/2024  CONCLUSION:   Persistent low lung volumes.  Question small bilateral pleural effusions.  The [place] catheter has been removed.  Multiple additional lines and tubes are unchanged.    Dictated by (CST): Cristóbal Donaldson MD on 3/24/2024 at 9:13 AM     Finalized by (CST): Cristóbal Donaldson MD on 3/24/2024 at 9:15 AM         EKG 12 Lead    Result Date: 3/24/2024  Atrial fibrillation Right bundle branch block Abnormal ECG When compared with ECG of 22-MAR-2024 06:23, Atrial fibrillation has replaced Electronic ventricular pacemaker     Results:     CBC:    Lab Results   Component Value Date    WBC 15.2 (H) 03/24/2024    WBC 15.3 (H) 03/23/2024    WBC 9.9 03/22/2024     Lab Results   Component Value Date    HGB 10.5 (L) 03/24/2024    HGB 11.1 (L) 03/23/2024    HGB 11.3 (L) 03/22/2024      Lab Results   Component Value Date    PLT 98.0 (L) 03/24/2024    .0 (L) 03/23/2024    .0 (L) 03/22/2024       Recent Labs   Lab 03/22/24  1601 03/23/24  0501 03/24/24  0354   * 135* 142*   BUN 13 17 20   CREATSERUM 0.69* 0.80 0.63*   CA 7.4* 8.1* 7.9*    142 146*   K 4.2 3.9 4.0    117* 117*   CO2 23.0 24.0 24.0             Assessment and Plan:       Dissection of thoracoabdominal aorta (HCC)   S/p aortic dissection repair with 30 mm x 32 mm Hemashield graft secondary to 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 (acute type a aortic dissection) -POD #3  On clevidipine per cv surgery   On metoprolol and asa   Plan extubation today   Pericardial drain in place - monitor output  Chest tube - monitor output   Maintain bp less then 130     Thrombocytopenia - monitor cbc daily     Hyperglycemia - cont iss     DVT ppx lovenox            Keya Workman, DO         Chart reviewed, including current vitals, notes, labs and imaging  Labs ordered and medications adjusted as  outlined above  Coordinate care with care team/consultants  Discussed with patient results of tests, management plan as outlined above, and the need for ongoing hospitalization  D/w RN     MDM high          PHYSICIAN Certification of Need for Inpatient Hospitalization - Initial Certification    Patient will require inpatient services that will reasonably be expected to span two midnight's based on the clinical documentation in H+P.   Based on patients current state of illness, I anticipate that, after discharge, patient will require TBD.

## 2024-03-24 NOTE — PROGRESS NOTES
Cardiology Progress Note    Talib Gonzalez Patient Status:  Inpatient    1950 MRN [id number]   Location [facility] 2W/SW Attending Keya Workman,    Hosp Day # 3 PCP No primary care provider on file.     Interval Note:  Patient seen and examined  Family bedside, questions answered  Stable hemodynamics    --------------------------------------------------------------------------------------------------------------------------------  ROS 12 systems reviewed, pertinent findings above.  ROS    History:  Past Medical History:   Diagnosis Date    Anxiety     Smoker      History reviewed. No pertinent surgical history.  No family history on file.   reports that he has been smoking cigarettes. He does not have any smokeless tobacco history on file.    Objective:   Temp: 98.7 °F (37.1 °C)  Pulse: 74  Resp: 14  BP: 120/65  AO: 142/55  FiO2 (%): 30 %    Intake/Output:     Intake/Output Summary (Last 24 hours) at 3/24/2024 0811  Last data filed at 3/24/2024 0600  Gross per 24 hour   Intake 802.4 ml   Output 1345 ml   Net -542.6 ml       Physical Exam:    General: Intubated, arousable  HEENT: Normocephalic, anicteric sclera, neck supple.  Neck: No JVD, carotids 2+, no bruits.  Cardiac: Regular rate and rhythm. S1, S2 normal. No murmur, pericardial rub, S3.  Lungs: Clear without wheezes, rales, rhonchi or dullness.  Normal excursions and effort.  Abdomen: Soft, non-tender. BS-present.  Extremities: Without clubbing, cyanosis or edema.  Peripheral pulses are 2+.  Neurologic: Non-focal  Skin: Warm and dry.       Assessment   Acute type A thoracic aortic dissection status postrepair 2024  Ventilator dependent postop  Shock-recovered  Altered mental status  Paroxysmal atrial fibrillation      Plan  Patient's clinical status improved, neurological status also improving-arousable and interacting with family  Vitals acceptable  Agree with amiodarone for rhythm control-convert to oral  Currently on Cleviprex  infusion, management per CT surgery  Patient remains in critical condition, will continue to follow    Thank you for allowing me to take part in the care of Talib Ghoshrdt. Please call with any questions of concerns.      Level of care: L3    Marcos Garcia DO  [facility]   Interventional Cardiac and Vascular Services      Marcos Garcia DO  March 24, 2024  12:00 PM

## 2024-03-24 NOTE — PROGRESS NOTES
[facility]  part of [facility]    Progress Note    Talib Gonzalez Patient Status:  Inpatient    1950 MRN [id number]   Location [facility] 2W/SW Attending Keya Workman DO   Hosp Day # 3 PCP No primary care provider on file.     Subjective:  Patient seen and examined while in bed, intubated with light sedation.  Eye opens, tracks and follows commands X4. Daughters and wife bedside    Objective:  /55   Pulse 72   Temp 98.7 °F (37.1 °C) (Temporal)   Resp 16   Ht 182.9 cm (6')   Wt 108.5 kg (239 lb 3.2 oz)   SpO2 100%   BMI 32.44 kg/m²     Temp (24hrs), Av.3 °F (36.8 °C), Min:97.7 °F (36.5 °C), Max:98.9 °F (37.2 °C)  Telemetry AFibb    Intake/Output:    Intake/Output Summary (Last 24 hours) at 3/24/2024 1034  Last data filed at 3/24/2024 0957  Gross per 24 hour   Intake 1074.4 ml   Output 1545 ml   Net -470.6 ml       Wt Readings from Last 3 Encounters:   24 108.5 kg (239 lb 3.2 oz)       Allergies:  Allergies   Allergen Reactions    Serotonin Reuptake Inhibitors DIARRHEA       Labs:  Lab Results   Component Value Date    WBC 15.2 2024    HGB 10.5 2024    HCT 31.4 2024    PLT 98.0 2024    CREATSERUM 0.63 2024    BUN 20 2024     2024    K 4.0 2024     2024    CO2 24.0 2024     2024    CA 7.9 2024       Physical Exam:  Blood pressure 139/55, pulse 72, temperature 98.7 °F (37.1 °C), temperature source Temporal, resp. rate 16, height 182.9 cm (6'), weight 108.5 kg (239 lb 3.2 oz), SpO2 100%.  General: intubated and lightly sedated  Neck: IVAN d/t body habitus  Lungs: CTA bilat  Heart: Rate normal, irregular rhythm with new AFibb  Abdomen: Soft, rounded, NT/ND, BS+x4, OG to LIS  Extremities: Warm, dry, no LE edema bilat  Pulses: 1+ bilat DP  Skin: sternotomy stable, incision CDI  Neurological:  Sedated. 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 Maintain goal SBP less than 130.   New Afibb- Amio protocol added  Increase activity once extubated-OOB to chair/ambulate, likely PT/OT consult  DVT prevention - SCDs/Lovenox  Expected postop anemia - mild/stable, consider starting Iron if Hgb <10, currently Hgb stable 10.5- received multiple products in OR  Expected postop volume overload - monitor daily weights, I/Os, lasix twice daily  Insulin coverage per protocol  Thrombocytopenia-as expected-monitor CBC, hold ASA again today, can resume if platelets do not continue to downtrend.  Hipa sent, awaiting results     Discharge planning:  Patient lives at home with wife, has supportive family.  Anticipate DC home once medically stable.  Will continue to assess as he progresses.     Discussed with family, RN and rounding CV Surgeon Dr. Anu MOTTA, RN  3/24/2024  10:34 AM

## 2024-03-24 NOTE — PLAN OF CARE
Restraints resumed pt reaching for ETT.        Problem: Safety Risk - Non-Violent Restraints  Goal: Patient will remain free from self-harm  Description: INTERVENTIONS:  - Apply the least restrictive restraint to prevent harm  - Notify patient and family of reasons restraints applied  - Assess for any contributing factors to confusion (electrolyte disturbances, delirium, medications)  - Discontinue any unnecessary medical devices as soon as possible  - Assess the patient's physical comfort, circulation, skin condition, hydration, nutrition and elimination needs   - Reorient and redirection as needed  - Assess for the need to continue restraints  3/24/2024 0740 by Yolanda Samuels, RN  Outcome: Progressing

## 2024-03-24 NOTE — PROGRESS NOTES
Extubated at 0945am to 6L NC. Patient tolerated well. A&Ox3, following commands, calm and cooperative. Family at bedside.    No Head atraumatic, normal cephalic shape.

## 2024-03-24 NOTE — PLAN OF CARE
Problem: RESPIRATORY - ADULT  Goal: Achieves optimal ventilation and oxygenation  Description: INTERVENTIONS:  - Assess for changes in respiratory status  - Assess for changes in mentation and behavior  - Position to facilitate oxygenation and minimize respiratory effort  - Oxygen supplementation based on oxygen saturation or ABGs  - Provide Smoking Cessation handout, if applicable  - Encourage broncho-pulmonary hygiene including cough, deep breathe, Incentive Spirometry  - Assess the need for suctioning and perform as needed  - Assess and instruct to report SOB or any respiratory difficulty  - Respiratory Therapy support as indicated  - Manage/alleviate anxiety  - Monitor for signs/symptoms of CO2 retention  Outcome: Progressing     Pt remains intubated, on full support. Pt tolerating well, saturating appropriately, suction as needed. No changes at this time, RT will continue to monitor.    Vent settings and readings as follow:     03/23/24 1945   Vent Information   $ RT Standby Charge (per 15 min) 1  (Vent check)   Interface Invasive   Vent Type    Vent plugged into main power? Yes   Vent ID    Vent Mode VC/AC   Settings   FiO2 (%) 30 %   Resp Rate (Set) 12   Vt (Set, mL) 600 mL   Waveform Decelerating ramp   PEEP/CPAP (cm H2O) 5 cm H20   Peak Flow LPM 50   Trigger Sensitivity Flow (L/min) 3 L/min   Humidification Heat and moisture exchanger   Readings   Total RR 13   Minute Ventilation (L/min) 7.8 L/min   Expiratory Tidal Volume 639 mL   PIP Observed (cm H2O) 22 cm H2O   MAP (cm H2O) 8.1   I/E Ratio 1:2.8   Plateau Pressure (cm H2O) 11 cm H2O   Static Compliance (L/cm H2O) 95   Dynamic Compliance (L/cm H2O) 33 L/cm H2O   Alarms   High RR 40   Insp Pressure High (cm H2O) 40 cm H2O   MV High (L/min) 20 L/min   MV Low (L/min) 3 L/min   Apnea Interval (sec) 20 seconds   Apnea Rate 12   Apnea Volume (mL) 600 mL      03/23/24 1945   ETT   Placement Date/Time: 03/21/24 (c) 0756   Airway Size: 7.5 mm   Cuffed: Cuffed  Insertion attempts: 1  Technique: Direct laryngoscopy  Placement Verification: Capnometry  Placed By: Anesthesiologist   Secured at (cm) 23 cm   Suctioned? N   Measured From Lips   Secured Location Right   Secured by Commercial tube gamble   Req'd equipment at bedside Bag mask   Additional Assessments   Pulse 63   Resp 13   SpO2 100 %

## 2024-03-24 NOTE — PROGRESS NOTES
[facility]  part of [facility]     Progress Note    Talib Gonzalez Patient Status:  Inpatient    1950 MRN [id number]   Location [facility] 2W/SW Attending Keya Workman DO   Hosp Day # 3 PCP No primary care provider on file.       Subjective:   Seen and examined.  Moving all extremities following commands on spontaneous breathing trial earlier this morning    Objective:   Blood pressure 105/55, pulse 65, temperature 97.7 °F (36.5 °C), temperature source Temporal, resp. rate 16, height 6' (1.829 m), weight 239 lb 3.2 oz (108.5 kg), SpO2 100%.  Intake/Output:   Last 3 shifts: I/O last 3 completed shifts:  In: .4 [I.V.:1782.4; NG/GT:40; IV PIGGYBACK:240]  Out:  [Urine:1545; Emesis/NG output:250; Chest Tube:250]   This shift: I/O this shift:  In: 558 [I.V.:308; NG/GT:250]  Out: 200 [Urine:200]     Vent Settings: Vent Mode: PS;CPAP  FiO2 (%):  [30 %] 30 %  S RR:  [12] 12  S VT:  [600 mL] 600 mL  PEEP/CPAP (cm H2O):  [5 cm H20] 5 cm H20  MAP (cm H2O):  [6-8.9] 6    Hemodynamic parameters (last 24 hours):      Scheduled Meds:         Continuous Infusions:    amiodarone 1 mg/min (24 1041)    Followed by    amiodarone      dextrose 5%-sodium chloride 0.9% 40 mL/hr (24 0500)    DOBUTamine Stopped (24 0900)    nitroGLYCERIN in dextrose 5% Stopped (24 1700)    norepinephrine      dexmedetomidine 0.5 mcg/kg/hr (24 0947)    insulin regular 2 Units/hr (24 1000)    clevidipine Stopped (24 0949)    propofol Stopped (24 0900)       Physical Exam  Constitutional: no acute distress, intubated, sedated  Eyes: PERRL  ENT: nares pateint  Neck: supple, no JVD  Cardio: RRR, S1 S2  Respiratory: Basilar crackles  GI: abdomen soft, non tender, active bowel sounds, no organomegaly  Extremities: no clubbing, cyanosis, edema  Neurologic: no gross motor deficits  Skin: warm, dry      Results:     Lab Results   Component Value Date    WBC 15.2  03/24/2024    HGB 10.5 03/24/2024    HCT 31.4 03/24/2024    PLT 98.0 03/24/2024    CREATSERUM 0.63 03/24/2024    BUN 20 03/24/2024     03/24/2024    K 4.0 03/24/2024     03/24/2024    CO2 24.0 03/24/2024     03/24/2024    CA 7.9 03/24/2024    TSH 0.643 03/24/2024    MG 1.9 03/24/2024       XR CHEST AP PORTABLE  (CPT=71045)    Result Date: 3/24/2024  CONCLUSION:   Persistent low lung volumes.  Question small bilateral pleural effusions.  The [place] catheter has been removed.  Multiple additional lines and tubes are unchanged.    Dictated by (CST): Cristóbal Donaldson MD on 3/24/2024 at 9:13 AM     Finalized by (CST): Cristóbal Donaldson MD on 3/24/2024 at 9:15 AM           EKG 12 Lead    Result Date: 3/24/2024  Atrial fibrillation Right bundle branch block Abnormal ECG When compared with ECG of 22-MAR-2024 06:23, Atrial fibrillation has replaced Electronic ventricular pacemaker     Assessment   1.  Type A aortic dissection POD #2 status post repair  2.  Acute respiratory failure  3.  Carotid stenosis  4.  Anemia  5.  Thrombocytopenia     Plan   -Patient presents with evidence of sudden onset abdominal discomfort with some generalized malaise earlier this morning.  No significant chest pain or back pain per patient.  -CT abdomen pelvis and subsequent CT chest abdomen pelvis with evidence of extensive type B dissection extending from aortic root to bilateral common iliac arteries.  Mild wall thickening of the ascending colon secondary to changes likely secondary to early ischemia.  -Status post emergent repair of type A dissection on 3/21/2024.  -Tolerated spontaneous breathing trial.  Proceeded with extubation.  -Wean nitroglycerin and Cleviprex as tolerated  -Close hemodynamic monitoring  -Closely monitor hemoglobin  -DVT prophylaxis: SCDs, Lovenox  -Discussed with family    Ruddy Rubi, DO  Pulmonary Critical Care Medicine  [facility]

## 2024-03-24 NOTE — PLAN OF CARE
Pt had 1st degree AV Block early in the shift and  he had freq pauses < 2sec his pacer paced but he did infrequently have non sensed beats landing close to the T wave.He did go  into AFib at a rate of 60s - lower 70s.He remains on Cleviprex at 1- 2 mg  which is keeping his B.P < 130.CT and Milad drain w/minimal drainage.u/o good.He is Anxious despite being on Propofol and Precedex and having  had a dose of Klonopin.He constantly wants his wife at his side. He suffers severe anxiety at home.    Problem: Patient Centered Care  Goal: Patient preferences are identified and integrated in the patient's plan of care  Description: Interventions:  - What would you like us to know as we care for you? From home with spouse Lucie, generally healthy overall  - Provide timely, complete, and accurate information to patient/family  - Incorporate patient and family knowledge, values, beliefs, and cultural backgrounds into the planning and delivery of care  - Encourage patient/family to participate in care and decision-making at the level they choose  - Honor patient and family perspectives and choices  Outcome: Progressing     Problem: Patient/Family Goals  Goal: Patient/Family Short Term Goal  Description: Patient's Short Term Goal: recover in immediate post-op period, safe BP and hemodynamic stability - goals discussed w/ wife at bedside while patient intubated/sedated    Interventions:   - maintain intubation/sedation overnight for POD#0  - continue monitoring via swan/aimee throughout POD#0-1  - See additional Care Plan goals for specific interventions  Outcome: Progressing     Problem: CARDIOVASCULAR - ADULT  Goal: Maintains optimal cardiac output and hemodynamic stability  Description: INTERVENTIONS:  - Monitor vital signs, rhythm, and trends  - Monitor for bleeding, hypotension and signs of decreased cardiac output  - Evaluate effectiveness of vasoactive medications to optimize hemodynamic stability  - Monitor arterial  and/or venous puncture sites for bleeding and/or hematoma  - Assess quality of pulses, skin color and temperature  - Assess for signs of decreased coronary artery perfusion - ex. Angina  - Evaluate fluid balance, assess for edema, trend weights  Outcome: Progressing  Goal: Absence of cardiac arrhythmias or at baseline  Description: INTERVENTIONS:  - Continuous cardiac monitoring, monitor vital signs, obtain 12 lead EKG if indicated  - Evaluate effectiveness of antiarrhythmic and heart rate control medications as ordered  - Initiate emergency measures for life threatening arrhythmias  - Monitor electrolytes and administer replacement therapy as ordered  Outcome: Not Progressing     Problem: RESPIRATORY - ADULT  Goal: Achieves optimal ventilation and oxygenation  Description: INTERVENTIONS:  - Assess for changes in respiratory status  - Assess for changes in mentation and behavior  - Position to facilitate oxygenation and minimize respiratory effort  - Oxygen supplementation based on oxygen saturation or ABGs  - Provide Smoking Cessation handout, if applicable  - Encourage broncho-pulmonary hygiene including cough, deep breathe, Incentive Spirometry  - Assess the need for suctioning and perform as needed  - Assess and instruct to report SOB or any respiratory difficulty  - Respiratory Therapy support as indicated  - Manage/alleviate anxiety  - Monitor for signs/symptoms of CO2 retention  Outcome: Progressing     Problem: GASTROINTESTINAL - ADULT  Goal: Minimal or absence of nausea and vomiting  Description: INTERVENTIONS:  - Maintain adequate hydration with IV or PO as ordered and tolerated  - Nasogastric tube to low intermittent suction as ordered  - Evaluate effectiveness of ordered antiemetic medications  - Provide nonpharmacologic comfort measures as appropriate  - Advance diet as tolerated, if ordered  - Obtain nutritional consult as needed  - Evaluate fluid balance  Outcome: Progressing  Goal: Maintains or  returns to baseline bowel function  Description: INTERVENTIONS:  - Assess bowel function  - Maintain adequate hydration with IV or PO as ordered and tolerated  - Evaluate effectiveness of GI medications  - Encourage mobilization and activity  - Obtain nutritional consult as needed  - Establish a toileting routine/schedule  - Consider collaborating with pharmacy to review patient's medication profile  Outcome: Not Progressing     Problem: METABOLIC/FLUID AND ELECTROLYTES - ADULT  Goal: Hemodynamic stability and optimal renal function maintained  Description: INTERVENTIONS:  - Monitor labs and assess for signs and symptoms of volume excess or deficit  - Monitor intake, output and patient weight  - Monitor urine specific gravity, serum osmolarity and serum sodium as indicated or ordered  - Monitor response to interventions for patient's volume status, including labs, urine output, blood pressure (other measures as available)  - Encourage oral intake as appropriate  - Instruct patient on fluid and nutrition restrictions as appropriate  Outcome: Progressing     Problem: HEMATOLOGIC - ADULT  Goal: Maintains hematologic stability  Description: INTERVENTIONS  - Assess for signs and symptoms of bleeding or hemorrhage  - Monitor labs and vital signs for trends  - Administer supportive blood products/factors, fluids and medications as ordered and appropriate  - Administer supportive blood products/factors as ordered and appropriate  Outcome: Progressing     Problem: SAFETY ADULT - FALL  Goal: Free from fall injury  Description: INTERVENTIONS:  - Assess pt frequently for physical needs  - Identify cognitive and physical deficits and behaviors that affect risk of falls.  - [place] fall precautions as indicated by assessment.  - Educate pt/family on patient safety including physical limitations  - Instruct pt to call for assistance with activity based on assessment  - Modify environment to reduce risk of injury  - Provide  assistive devices as appropriate  - Consider OT/PT consult to assist with strengthening/mobility  - Encourage toileting schedule  Outcome: Progressing     Problem: Safety Risk - Non-Violent Restraints  Goal: Patient will remain free from self-harm  Description: INTERVENTIONS:  - Apply the least restrictive restraint to prevent harm  - Notify patient and family of reasons restraints applied  - Assess for any contributing factors to confusion (electrolyte disturbances, delirium, medications)  - Discontinue any unnecessary medical devices as soon as possible  - Assess the patient's physical comfort, circulation, skin condition, hydration, nutrition and elimination needs   - Reorient and redirection as needed  - Assess for the need to continue restraints  Outcome: Progressing

## 2024-03-25 LAB
ANION GAP SERPL CALC-SCNC: 8 MMOL/L (ref 0–18)
BASOPHILS # BLD AUTO: 0.03 X10(3) UL (ref 0–0.2)
BASOPHILS NFR BLD AUTO: 0.2 %
BUN BLD-MCNC: 20 MG/DL (ref 9–23)
BUN/CREAT SERPL: 27.8 (ref 10–20)
CALCIUM BLD-MCNC: 7.8 MG/DL (ref 8.7–10.4)
CHLORIDE SERPL-SCNC: 119 MMOL/L (ref 98–112)
CO2 SERPL-SCNC: 20 MMOL/L (ref 21–32)
CREAT BLD-MCNC: 0.72 MG/DL
DEPRECATED RDW RBC AUTO: 52.2 FL (ref 35.1–46.3)
EGFRCR SERPLBLD CKD-EPI 2021: 96 ML/MIN/1.73M2 (ref 60–?)
EOSINOPHIL # BLD AUTO: 0.15 X10(3) UL (ref 0–0.7)
EOSINOPHIL NFR BLD AUTO: 0.9 %
ERYTHROCYTE [DISTWIDTH] IN BLOOD BY AUTOMATED COUNT: 14.4 % (ref 11–15)
GLUCOSE BLD-MCNC: 117 MG/DL (ref 70–99)
GLUCOSE BLDC GLUCOMTR-MCNC: 110 MG/DL (ref 70–99)
GLUCOSE BLDC GLUCOMTR-MCNC: 111 MG/DL (ref 70–99)
GLUCOSE BLDC GLUCOMTR-MCNC: 113 MG/DL (ref 70–99)
GLUCOSE BLDC GLUCOMTR-MCNC: 116 MG/DL (ref 70–99)
GLUCOSE BLDC GLUCOMTR-MCNC: 118 MG/DL (ref 70–99)
GLUCOSE BLDC GLUCOMTR-MCNC: 120 MG/DL (ref 70–99)
GLUCOSE BLDC GLUCOMTR-MCNC: 124 MG/DL (ref 70–99)
GLUCOSE BLDC GLUCOMTR-MCNC: 124 MG/DL (ref 70–99)
GLUCOSE BLDC GLUCOMTR-MCNC: 125 MG/DL (ref 70–99)
GLUCOSE BLDC GLUCOMTR-MCNC: 127 MG/DL (ref 70–99)
GLUCOSE BLDC GLUCOMTR-MCNC: 139 MG/DL (ref 70–99)
GLUCOSE BLDC GLUCOMTR-MCNC: 142 MG/DL (ref 70–99)
GLUCOSE BLDC GLUCOMTR-MCNC: 144 MG/DL (ref 70–99)
GLUCOSE BLDC GLUCOMTR-MCNC: 147 MG/DL (ref 70–99)
GLUCOSE BLDC GLUCOMTR-MCNC: 151 MG/DL (ref 70–99)
HCT VFR BLD AUTO: 29.4 %
HEPARIN AB PPP QL PL AGG: NEGATIVE
HGB BLD-MCNC: 9.7 G/DL
IMM GRANULOCYTES # BLD AUTO: 0.1 X10(3) UL (ref 0–1)
IMM GRANULOCYTES NFR BLD: 0.6 %
LYMPHOCYTES # BLD AUTO: 1.9 X10(3) UL (ref 1–4)
LYMPHOCYTES NFR BLD AUTO: 11.9 %
MCH RBC QN AUTO: 32.3 PG (ref 26–34)
MCHC RBC AUTO-ENTMCNC: 33 G/DL (ref 31–37)
MCV RBC AUTO: 98 FL
MONOCYTES # BLD AUTO: 1.01 X10(3) UL (ref 0.1–1)
MONOCYTES NFR BLD AUTO: 6.3 %
NEUTROPHILS # BLD AUTO: 12.81 X10 (3) UL (ref 1.5–7.7)
NEUTROPHILS # BLD AUTO: 12.81 X10(3) UL (ref 1.5–7.7)
NEUTROPHILS NFR BLD AUTO: 80.1 %
OSMOLALITY SERPL CALC.SUM OF ELEC: 308 MOSM/KG (ref 275–295)
PLATELET # BLD AUTO: 104 10(3)UL (ref 150–450)
PLATELETS.RETICULATED NFR BLD AUTO: 6.2 % (ref 0–7)
POTASSIUM SERPL-SCNC: 3.5 MMOL/L (ref 3.5–5.1)
RBC # BLD AUTO: 3 X10(6)UL
SODIUM SERPL-SCNC: 147 MMOL/L (ref 136–145)
TRIGL SERPL-MCNC: 97 MG/DL (ref 30–149)
WBC # BLD AUTO: 16 X10(3) UL (ref 4–11)

## 2024-03-25 PROCEDURE — 99233 SBSQ HOSP IP/OBS HIGH 50: CPT | Performed by: HOSPITALIST

## 2024-03-25 PROCEDURE — 99232 SBSQ HOSP IP/OBS MODERATE 35: CPT | Performed by: INTERNAL MEDICINE

## 2024-03-25 RX ORDER — DOCUSATE SODIUM 100 MG/1
100 CAPSULE, LIQUID FILLED ORAL 2 TIMES DAILY
Status: DISCONTINUED | OUTPATIENT
Start: 2024-03-25 | End: 2024-03-28

## 2024-03-25 RX ORDER — CLONAZEPAM 0.25 MG/1
0.5 TABLET, ORALLY DISINTEGRATING ORAL EVERY 6 HOURS
Status: DISCONTINUED | OUTPATIENT
Start: 2024-03-25 | End: 2024-03-28

## 2024-03-25 RX ORDER — FUROSEMIDE 10 MG/ML
20 INJECTION INTRAMUSCULAR; INTRAVENOUS
Status: COMPLETED | OUTPATIENT
Start: 2024-03-25 | End: 2024-03-26

## 2024-03-25 RX ORDER — LORAZEPAM 1 MG/1
1 TABLET ORAL ONCE
Status: COMPLETED | OUTPATIENT
Start: 2024-03-25 | End: 2024-03-25

## 2024-03-25 RX ORDER — SIMETHICONE 80 MG
80 TABLET,CHEWABLE ORAL 4 TIMES DAILY PRN
Status: DISCONTINUED | OUTPATIENT
Start: 2024-03-25 | End: 2024-03-28

## 2024-03-25 NOTE — OCCUPATIONAL THERAPY NOTE
OCCUPATIONAL THERAPY EVALUATION - INPATIENT     Room Number: 234/234-A  Evaluation Date: 3/25/2024  Type of Evaluation: Initial  Presenting Problem: type a aortic dissection status postsurgical repair    Physician Order: IP Consult to Occupational Therapy  Reason for Therapy: ADL/IADL Dysfunction and Discharge Planning    OCCUPATIONAL THERAPY ASSESSMENT   Patient is a 73 year old male admitted 3/21/2024 for dissection of thoracoabdominal aorta.  Prior to admission, patient's baseline is I with all ADLs and IADLs, driving, active.  Patient is currently functioning below baseline with toileting, bathing, upper body dressing, lower body dressing, grooming, bed mobility, transfers, dynamic sitting balance, functional standing tolerance, energy conservation strategies, aerobic capacity, and performing household tasks.  Patient is requiring  mod assist x2  as a result of the following impairments: decreased functional strength, decreased functional reach, decreased endurance, pain, impaired dynamic balance, decreased muscular endurance, difficulty maintaining precautions, medical status, and increased O2 needs from baseline. Occupational Therapy will continue to follow for duration of hospitalization.    Patient will benefit from continued skilled OT Services to facilitate return to prior level of function as patient demonstrates high motivation with excellent tolerance to an intensive therapy program     PLAN  OT Treatment Plan: Balance activities;Energy conservation/work simplification techniques;ADL training;IADL training;Functional transfer training;UE strengthening/ROM;Endurance training;Patient/Family education;Patient/Family training;Equipment eval/education  OT Device Recommendations: TBD    OCCUPATIONAL THERAPY MEDICAL/SOCIAL HISTORY   Problem List  Principal Problem:    Dissection of thoracoabdominal aorta (HCC)    HOME SITUATION  Type of Home: House  Home Layout: One level (1 JOCE)  Lives With: Spouse  Toilet  and Equipment: Standard height toilet  Shower/Tub and Equipment: Walk-in shower; Shower chair  Occupation/Status: Retired, driving, active  Hand Dominance: Right  Drives: Yes  Patient Regularly Uses: Glasses    Stairs in Home: 1 level, 1 juliocesar  Use of Assistive Device(s): None    Prior Level of [place]: Pt I with all ADLs and ADLs. Driving, I with community mobility.    SUBJECTIVE  \"I can walk to the bed\"    OCCUPATIONAL THERAPY EXAMINATION    OBJECTIVE  Precautions: Sternal; Chest tube  Fall Risk: Standard fall risk    PAIN ASSESSMENT  Ratin  Location: chest  Management Techniques: Activity promotion; Body mechanics; Repositioning; Nurse notified    ACTIVITY TOLERANCE  Pulse: 86  Heart Rate Source: Monitor  Resp: 22  BP: 133/85  BP Location: Right arm  BP Method: Automatic  Patient Position: Sitting    O2 SATURATIONS  Oxygen Therapy  SPO2% on Oxygen at Rest: 99  At rest oxygen flow (liters per minute): 2  SPO2% Ambulation on Oxygen: 97  Ambulation oxygen flow (liters per minute): 2    COGNITION  Overall Cognitive Status:  WFL - within functional limits    VISION  Current Vision: wears glasses all the time    PERCEPTION  Overall Perception Status:   WFL - within functional limits    SENSATION  Light touch:  intact    Communication: WFL    Behavioral/Emotional/Social: WFL, alert, cooperative, agreeable    RANGE OF MOTION   Upper extremity ROM is within functional limits     STRENGTH ASSESSMENT  Upper extremity strength is within functional limits     COORDINATION  Gross Motor: WFL   Fine Motor: WFL     ACTIVITIES OF DAILY LIVING ASSESSMENT  AM-PAC ‘6-Clicks’ Inpatient Daily Activity Short Form  How much help from another person does the patient currently need…  -   Putting on and taking off regular lower body clothing?: A Lot  -   Bathing (including washing, rinsing, drying)?: A Lot  -   Toileting, which includes using toilet, bedpan or urinal? : A Lot  -   Putting on and taking off regular upper body  clothing?: A Lot  -   Taking care of personal grooming such as brushing teeth?: A Little  -   Eating meals?: A Little    AM-PAC Score:  Score: 14  Approx Degree of Impairment: 59.67%  Standardized Score (AM-PAC Scale): 33.39  CMS Modifier (G-Code): CK    FUNCTIONAL TRANSFER ASSESSMENT  Sit to Stand: Edge of Bed; Chair  Edge of Bed: Moderate Assist (x2)  Chair: Moderate Assist (x2)    BED MOBILITY  Sit to Supine (OT): Moderate Assist (Mod a x2)    BALANCE ASSESSMENT  Static Standing: Moderate Assist    FUNCTIONAL ADL ASSESSMENT  Eating: Stand-by Assist  Grooming Seated: Minimal Assist  Bathing Seated: Maximum Assist  UB Dressing Seated: Maximum Assist  LB Dressing Seated: Maximum Assist  Toileting Seated: Moderate Assist    THERAPEUTIC EXERCISE     Skilled Therapy Provided: Pt seen for skilled OT evaluation. Pt family present and supportive. Pt requiring MOD A x2 for functional mobility, bed t/fs, and MAX A for ADLs at this time. Edu provided re: role of OT, AE/DME recommendations, sternal precautions, importance of OOB activity, ADL modification. Pt and family demo'd understanidng of all concepts covered.     EDUCATION PROVIDED  Patient: Role of Occupational Therapy; Plan of Care; Adaptive Equipment Recommendations; Discharge Recommendations; Functional Transfer Techniques; DME Recommendations; Fall Prevention; Surgical Precautions; Posture/Positioning; Energy Conservation; Compensatory ADL Techniques  Patient's Response to Education: Verbalized Understanding; Returned Demonstration  Family/Caregiver: Role of Occupational Therapy; Plan of Care; Adaptive Equipment Recommendations; Discharge Recommendations; Functional Transfer Techniques; DME Recommendations; Fall Prevention; Surgical Precautions; Posture/Positioning; Energy Conservation; Compensatory ADL Techniques  Family/Caregiver's Response to Education: Verbalized Understanding; Returned Demonstration    The patient's Approx Degree of Impairment: 59.67% has been  calculated based on documentation in the [facility] '6 clicks' Inpatient Daily Activity Short Form.  Research supports that patients with this level of impairment may benefit from IRF.  Final disposition will be made by interdisciplinary medical team.     Patient End of Session: In bed;Family present;All patient questions and concerns addressed;RN aware of session/findings;Call light within reach;Needs met    OT Goals  Patients self stated goal is: To get stronger       Patient will complete functional transfer with MOD I   Comment:     Patient will complete toileting with MOD I   Comment:     Patient will tolerate standing for 5 minutes in prep for adls with MOD I    Comment:    Patient will complete item retrieval with MOD I   Comment:          Goals  on: 24  Frequency: 3-5x/week    Patient Evaluation Complexity Level:   Occupational Profile/Medical History MODERATE - Expanded review of history including review of medical or therapy record   Specific performance deficits impacting engagement in ADL/IADL MODERATE  3 - 5 performance deficits   Client Assessment/Performance Deficits MODERATE - Comorbidities and min to mod modifications of tasks    Clinical Decision Making MODERATE - Analysis of occupational profile, detailed assessments, several treatment options    Overall Complexity MODERATE     OT Session Time: 30 minutes  Self-Care Home Management: 15 minutes  Therapeutic Activity: 15 minutes    Consuelo Thompson MS, OTR/L

## 2024-03-25 NOTE — PROGRESS NOTES
[facility]  part of [facility]    Progress Note    Talib Gonzalez Patient Status:  Inpatient    1950 MRN [id number]   Location [facility] 2W/SW Attending Keya Workman,    Hosp Day # 4 PCP No primary care provider on file.     Subjective:  Pt sitting in the chair with his wife at bedside and daughter just arrived. He is awake, alert & oriented x 3, calm and appropriate w/no neuro deficits noted. He c/o mild sternal incision discomfort. Denies SOB. Does explain his history of anxiety and medication he takes for it.     Objective:  /82 (BP Location: Right arm)   Pulse 86   Temp 98.1 °F (36.7 °C) (Temporal)   Resp 18   Ht 6' (1.829 m)   Wt 239 lb 13.8 oz (108.8 kg)   SpO2 98%   BMI 32.53 kg/m²     Temp (24hrs), Av.2 °F (36.8 °C), Min:97.2 °F (36.2 °C), Max:98.8 °F (37.1 °C)      Intake/Output:    Intake/Output Summary (Last 24 hours) at 3/25/2024 1233  Last data filed at 3/25/2024 1200  Gross per 24 hour   Intake 2187.48 ml   Output 1825 ml   Net 362.48 ml       Wt Readings from Last 3 Encounters:   24 239 lb 13.8 oz (108.8 kg)       Allergies:  Allergies   Allergen Reactions    Serotonin Reuptake Inhibitors DIARRHEA       Labs:  Lab Results   Component Value Date    WBC 16.0 2024    HGB 9.7 2024    HCT 29.4 2024    .0 2024    CREATSERUM 0.72 2024    BUN 20 2024     2024    K 3.5 2024     2024    CO2 20.0 2024     2024    CA 7.8 2024       Physical Exam:  Blood pressure 125/82, pulse 86, temperature 98.1 °F (36.7 °C), temperature source Temporal, resp. rate 18, height 6' (1.829 m), weight 239 lb 13.8 oz (108.8 kg), SpO2 98%.  General: NAD  Neck: No JVD  Lungs: Clear and diminished throughout  [place] drain=50cc/12 hours  Pleural chest tubes= 680cc/12 hours- no air leak noted  Heart: RRR, S1, S2  Abdomen: Soft/Round, NT/ND, BS+x4/no Flatus/no BM    Extremities: Warm, dry, no LE edema bilat  Pulses: 1+ bilat DP  Skin: sternotomy incision drsg: CDI w/TPW Intact/Right femoral drsg: CDI   Neurological:  AAOx3, MAEW-weak    Assessment/Plan:  S/P EMERGENT TYPE A AORTIC DISSECTION REPAIR POD # 4  Encourage pulm toilet: IS - currently on 4 liters NC: wean as able.   + smoker: encouraged smoking cessation/explained importance- pt agrees to quit and is ready  Pain meds as needed  Increase activity-oob to chair and ambulate- asking PT/OT to eval/treat  Scds/Lovenox prophylaxis DVT prevention   Continues hemodynamically stable- remove A-line. Keep ICU status [id number] tomorrow  IV Insulin protocol: no hx: DM- transition today  Swallow eval prior to oral intake due to prolonged intubation- follow recs  Expected post op volume overload: Lasix BID today  Expected post op anemia w/o clinical significance/stable.   Hx: Anxiety- resumed home med regimen as pt described.   Intermittent post op AF- on Amio-currently SR  Thrombocytopenia resolved as plts trending upward >100,000. HIPA 3/24=negative  Discharge planning: pt lives w/his wife. Continue to eval as pt progresses. Goal is home.     Plan of care discussed w/patient/wife/RN and CV Surgeon: Dr. Darcy Curtis, APRN  3/25/2024  12:33 PM

## 2024-03-25 NOTE — DIETARY NOTE
NUTRITION EDUCATION NOTE     Received consult for cardiac nutrition education. Verbally reviewed basic cardiac diet restrictions. Provided with Eating Heart-Healthy and NCM handout to reinforce. Receptive to instruction. Would benefit from outpt f/u. Expect fair compliance.        Ching Soria RD, LDN  Clinical Dietitian  P: [phone]

## 2024-03-25 NOTE — PROGRESS NOTES
[facility]  part of [facility]    Progress Note    Talib Gonzalez Patient Status:  Inpatient    1950 MRN [id number]   Location [facility] 2W/SW Attending Keya Workman,    Hosp Day # 4 PCP No primary care provider on file.         Subjective:     Constitutional:  Negative for fever.   Respiratory:  Negative for cough and shortness of breath.    Cardiovascular:  Negative for chest pain.   Gastrointestinal:  Negative for abdominal distention.   Neurological: Negative.    Psychiatric/Behavioral: Negative.       Patient was seen and examined  Up to the chair  Comfortable on 2 L of oxygen  Denies chest pain or dyspnea or cough  Objective:   Blood pressure 134/69, pulse 81, temperature 98.1 °F (36.7 °C), temperature source Temporal, resp. rate 22, height 6' (1.829 m), weight 239 lb 13.8 oz (108.8 kg), SpO2 99%.  Physical Exam  Constitutional:       General: He is not in acute distress.     Appearance: He is not ill-appearing.   HENT:      Head: Atraumatic.      Mouth/Throat:      Mouth: Mucous membranes are moist.   Eyes:      General: No scleral icterus.  Cardiovascular:      Rate and Rhythm: Normal rate.      Heart sounds:      No gallop.   Pulmonary:      Effort: No respiratory distress.      Breath sounds: No stridor. No wheezing, rhonchi or rales.   Abdominal:      General: Abdomen is flat. Bowel sounds are normal.      Palpations: Abdomen is soft.   Musculoskeletal:      Cervical back: Neck supple.   Skin:     General: Skin is dry.   Neurological:      General: No focal deficit present.      Mental Status: He is oriented to person, place, and time.         Results:   Lab Results   Component Value Date    WBC 16.0 (H) 2024    HGB 9.7 (L) 2024    HCT 29.4 (L) 2024    .0 (L) 2024    CREATSERUM 0.72 2024    BUN 20 2024     (H) 2024    K 3.5 2024     (H) 2024    CO2 20.0 (L) 2024     (H)  03/25/2024    CA 7.8 (L) 03/25/2024    ALB 3.7 03/30/2023    ALKPHO 74 03/30/2023    BILT 0.5 03/30/2023    TP 7.4 03/30/2023    AST 21 03/30/2023    ALT 26 03/30/2023    PTT 37.3 (H) 03/21/2024    INR 1.50 (H) 03/21/2024    TSH 0.643 03/24/2024    PSA 4.0 12/05/2017    DDIMER >20.00 (H) 03/21/2024    MG 1.9 03/24/2024       XR CHEST AP PORTABLE  (CPT=71045)    Result Date: 3/24/2024  CONCLUSION:   Persistent low lung volumes.  Question small bilateral pleural effusions.  The [place] catheter has been removed.  Multiple additional lines and tubes are unchanged.    Dictated by (CST): Cristóbal Donaldson MD on 3/24/2024 at 9:13 AM     Finalized by (CST): Cristóbal Donaldson MD on 3/24/2024 at 9:15 AM         EKG 12 Lead    Result Date: 3/24/2024  Atrial fibrillation Right bundle branch block Abnormal ECG When compared with ECG of 22-MAR-2024 06:23, Atrial fibrillation is noted Confirmed by DINESH MARI, LLUVIA ([id number]) on 3/24/2024 11:44:12 PM     Assessment & Plan:       1-type a aortic dissection status postsurgical repair  POD# 3   Doing well   On 2 L of oxygen with 98% O2 sat continue to wean O2  Pain management  Incentive spirometry    Renal status stable  Platelet improving  Pulmonary and hemodynamically stable    2-DVT prophylaxis  Lovenox subcu                Anjali Moe MD  3/25/2024

## 2024-03-25 NOTE — PHYSICAL THERAPY NOTE
PHYSICAL THERAPY EVALUATION - INPATIENT     Room Number: 234/234-A  Evaluation Date: 3/25/2024  Type of Evaluation: Initial   Physician Order: PT Eval and Treat    Presenting Problem: aortic dissection status postsurgical repair on 3/22/24  Co-Morbidities : Anxiety  Reason for Therapy: Mobility Dysfunction and Discharge Planning    PHYSICAL THERAPY ASSESSMENT   Patient is a 73 year old male admitted 3/21/2024 for aortic dissection status postsurgical repair on 3/22/24.  Prior to admission, patient's baseline is [place] in ADL's and IADL's +driving.  Patient is currently functioning below baseline with bed mobility, transfers, gait, stair negotiation, standing prolonged periods, and performing household tasks.  Patient is requiring moderate assist and maximum assist as a result of the following impairments: decreased functional strength, decreased endurance/aerobic capacity, pain, impaired dynamic standing balance, decreased muscular endurance, difficulty maintaining precautions, medical status, and increased O2 needs from baseline.  Physical Therapy will continue to follow for duration of hospitalization.    Patient will benefit from continued skilled PT Services to facilitate return to prior level of function as patient demonstrates high motivation with excellent tolerance to an intensive therapy program .    PLAN  PT Treatment Plan: Bed mobility;Body mechanics;Energy conservation;Endurance;Patient education;Family education;Gait training;Stoop training;Stair training;Strengthening;Transfer training;Balance training;Range of motion  Rehab Potential : Good  Frequency (Obs): Daily    PHYSICAL THERAPY MEDICAL/SOCIAL HISTORY     Problem List  Principal Problem:    Dissection of thoracoabdominal aorta (HCC)      HOME SITUATION  Home Situation  Type of Home: House  Home Layout: One level (1 JOCE)  Stairs to Enter : 1  Lives With: Spouse  Drives: Yes  Patient Owned Equipment: None  Patient Regularly Uses: Glasses        SUBJECTIVE  \"I love [employer] and working in the yard\"     PHYSICAL THERAPY EXAMINATION   OBJECTIVE  Precautions: Sternal;Chest tube  Fall Risk: Standard fall risk    WEIGHT BEARING RESTRICTION  Weight Bearing Restriction: None      PAIN ASSESSMENT  Ratin  Location: incisional  Management Techniques: Activity promotion;Body mechanics;Repositioning    COGNITION  Overall Cognitive Status:  WFL - within functional limits  Following Commands:  follows multistep commands with increased time    RANGE OF MOTION AND STRENGTH ASSESSMENT  Upper extremity ROM and strength are within functional limits   Lower extremity ROM is within functional limits   Lower extremity strength is impaired bilateral LE's: 4/5    BALANCE  Static Sitting: Fair +  Dynamic Sitting: Fair  Static Standing: Fair -  Dynamic Standing: Poor +    NEUROLOGICAL FINDINGS           Sensation: baseline neuropathy bilateral LE's          ACTIVITY TOLERANCE  Pulse: 86  Heart Rate Source: Monitor     BP: 133/85  BP Location: Right arm  BP Method: Automatic  Patient Position: Sitting    O2 WALK  Oxygen Therapy  SPO2% on Oxygen at Rest: 99  At rest oxygen flow (liters per minute): 2  SPO2% Ambulation on Oxygen: 97  Ambulation oxygen flow (liters per minute): 2    AM-PAC '6-Clicks' INPATIENT SHORT FORM - BASIC MOBILITY  How much difficulty does the patient currently have...  Patient Difficulty: Turning over in bed (including adjusting bedclothes, sheets and blankets)?: A Lot   Patient Difficulty: Sitting down on and standing up from a chair with arms (e.g., wheelchair, bedside commode, etc.): A Lot   Patient Difficulty: Moving from lying on back to sitting on the side of the bed?: A Lot   How much help from another person does the patient currently need...   Help from Another: Moving to and from a bed to a chair (including a wheelchair)?: A Lot   Help from Another: Need to walk in hospital room?: A Lot   Help from Another: Climbing 3-5 steps with a  railing?: A Lot     AM-PAC Score:  Raw Score: 12   Approx Degree of Impairment: 68.66%   Standardized Score (AM-PAC Scale): 35.33   CMS Modifier (G-Code): CL    FUNCTIONAL ABILITY STATUS  Functional Mobility/Gait Assessment  Gait Assistance: Moderate assistance (chair follow)  Distance (ft): 15ft  Assistive Device:  (cardiac cart)  Pattern: Shuffle (decreased juliano speed, guarded posture. Cues for upright posture and assist wtih cardiac cart with turn)  Rolling: moderate assist  Sit to Supine: moderate assist and assist x 2. Cues/assist for log roll technique.   Sit to Stand: moderate assist and assist x 2. Cues for widening base of support. Instruction on anterior<>posterior weight shift to gain momentum to stand. Cues to avoid UE weight bearing to maintain compliance toward sternal precautions. Min A static standing with bilateral UE support on cardiac cart.     Exercise/Education Provided:  Bed mobility  Body mechanics  Energy conservation  Functional activity tolerated  Gait training  Posture  ROM  Strengthening  Transfer training    The patient's Approx Degree of Impairment: 68.66% has been calculated based on documentation in the [facility] '6 clicks' Inpatient Basic Mobility Short Form.  Research supports that patients with this level of impairment may benefit from rehab.  Final disposition will be made by interdisciplinary medical team.    Patient received sitting in bedside chair with family present. RN approved activity. Coordinated session with OT. Therapist educated pt on POC and physiological benefits of mobilization. Educated pt on sternal precautions along with positioning techniques to maintain compliance. Patient agreeable to participate. Primary complaint is incisional pain which he rates at 4 out of 10 per pain scale. Vitals stable throughout.   Patient End of Session: In bed;Needs met;Call light within reach;RN aware of session/findings;Family present    CURRENT GOALS  Goals to be met by:  4/8/24  Patient Goal Patient's self-stated goal is: return to PLOF   Goal #1 Patient is able to demonstrate supine - sit EOB @ level: supervision     Goal #1   Current Status    Goal #2 Patient is able to demonstrate transfers Sit to/from Stand at assistance level: supervision with walker - rolling     Goal #2  Current Status    Goal #3 Patient is able to ambulate 150 feet with assist device: walker - rolling at assistance level: supervision   Goal #3   Current Status    Goal #4 Patient will negotiate curb w/ assistive device and supervision   Goal #4   Current Status    Goal #5 Patient to demonstrate independence with home activity/exercise instructions provided to patient in preparation for discharge.   Goal #5   Current Status    Goal #6 Patient will demonstrate compliance toward post operative sternal precautions 100% of time to optimize healing- comprehension will be assessed with pt verbalizing 8 out of 8 precautions.    Goal #6  Current Status      Patient Evaluation Complexity Level:  History Moderate - 1 or 2 personal factors and/or co-morbidities   Examination of body systems Moderate - addressing a total of 3 or more elements   Clinical Presentation  Moderate - Evolving   Clinical Decision Making  Moderate Complexity     Gait Training: 10 minutes  Therapeutic Activity:  15 minutes

## 2024-03-25 NOTE — PROGRESS NOTES
[facility]  part of [facility]    Progress Note    Talib Gonzalez Patient Status:  Inpatient    1950 MRN [id number]   Location [facility] 2W/SW Attending Keya Workman,    Hosp Day # 4 PCP No primary care provider on file.     Chief complaint aortic dissection     Subjective:   Talib Gonzalez is a(n) 73 year old male pt off vent. Doing well     Objective:   Blood pressure 133/85, pulse 86, temperature 98.1 °F (36.7 °C), temperature source Temporal, resp. rate 22, height 6' (1.829 m), weight 239 lb 13.8 oz (108.8 kg), SpO2 99%.      Intake/Output Summary (Last 24 hours) at 3/25/2024 1517  Last data filed at 3/25/2024 1422  Gross per 24 hour   Intake 1740.75 ml   Output 1775 ml   Net -34.25 ml       Patient Weight(s) for the past 336 hrs:   Weight   24 0600 239 lb 13.8 oz (108.8 kg)   24 0600 239 lb 3.2 oz (108.5 kg)   24 0700 240 lb 8.4 oz (109.1 kg)   24 0208 216 lb 14.9 oz (98.4 kg)   24 1248 217 lb (98.4 kg)   24 1231 217 lb (98.4 kg)           General appearance: awake  Pulmonary:  clear to auscultation bilaterally  Cardiovascular: S1, S2 normal, no murmur, click, rub or gallop, regular rate and rhythm  Abdominal: soft, non-tender; bowel sounds normal; no masses,  no organomegaly, chest tubes in place  Extremities: extremities normal, atraumatic, no cyanosis or edema        Medicines:           Lab Results   Component Value Date    WBC 16.0 (H) 2024    HGB 9.7 (L) 2024    HCT 29.4 (L) 2024    .0 (L) 2024    CREATSERUM 0.72 2024    BUN 20 2024     (H) 2024    K 3.5 2024     (H) 2024    CO2 20.0 (L) 2024     (H) 2024    CA 7.8 (L) 2024    ALB 3.7 2023    ALKPHO 74 2023    BILT 0.5 2023    TP 7.4 2023    AST 21 2023    ALT 26 2023    PTT 37.3 (H) 2024    INR 1.50 (H) 2024    TSH 0.643  03/24/2024    PSA 4.0 12/05/2017    DDIMER >20.00 (H) 03/21/2024    MG 1.9 03/24/2024       XR CHEST AP PORTABLE  (CPT=71045)    Result Date: 3/24/2024  CONCLUSION:   Persistent low lung volumes.  Question small bilateral pleural effusions.  The [place]-Saba catheter has been removed.  Multiple additional lines and tubes are unchanged.    Dictated by (CST): Cristóbal Donaldson MD on 3/24/2024 at 9:13 AM     Finalized by (CST): Cristóbal Donaldson MD on 3/24/2024 at 9:15 AM         EKG 12 Lead    Result Date: 3/24/2024  Atrial fibrillation Right bundle branch block Abnormal ECG When compared with ECG of 22-MAR-2024 06:23, Atrial fibrillation is noted Confirmed by DINESH MARI EVANS ([id number]) on 3/24/2024 11:44:12 PM     Results:     CBC:    Lab Results   Component Value Date    WBC 16.0 (H) 03/25/2024    WBC 15.2 (H) 03/24/2024    WBC 15.3 (H) 03/23/2024     Lab Results   Component Value Date    HGB 9.7 (L) 03/25/2024    HGB 10.5 (L) 03/24/2024    HGB 11.1 (L) 03/23/2024      Lab Results   Component Value Date    .0 (L) 03/25/2024    PLT 98.0 (L) 03/24/2024    .0 (L) 03/23/2024       Recent Labs   Lab 03/23/24  0501 03/24/24  0354 03/25/24  0401   * 142* 117*   BUN 17 20 20   CREATSERUM 0.80 0.63* 0.72   CA 8.1* 7.9* 7.8*    146* 147*   K 3.9 4.0 3.5   * 117* 119*   CO2 24.0 24.0 20.0*             Assessment and Plan:       Dissection of thoracoabdominal aorta (HCC)   S/p aortic dissection repair with 30 mm x 32 mm Hemashield graft secondary to 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 (acute type a aortic dissection) -POD #4  On metoprolol and asa   Extubated well   Chest tube - monitor output   Maintain bp less then 130     Thrombocytopenia - monitor cbc daily     Hyperglycemia - cont iss     DVT ppx lovenox            Keya Workman, DO         Chart reviewed, including current vitals, notes, labs and imaging  Labs ordered and medications adjusted as outlined above  Coordinate care with  care team/consultants  Discussed with patient results of tests, management plan as outlined above, and the need for ongoing hospitalization  D/w RN     MDM high          PHYSICIAN Certification of Need for Inpatient Hospitalization - Initial Certification    Patient will require inpatient services that will reasonably be expected to span two midnight's based on the clinical documentation in H+P.   Based on patients current state of illness, I anticipate that, after discharge, patient will require TBD.

## 2024-03-25 NOTE — PROGRESS NOTES
[facility]  part of [facility]    Cardiology Progress Note    Talib Gonzalez Patient Status:  Inpatient    1950 MRN [id number]   Location [facility] 2W/SW Attending Keya Workman,    Hosp Day # 4 PCP No primary care provider on file.     Subjective   Patient awake and alert  Sitting in the chair  Family by the bedside  Chest tube in place    Objective:   Patient Vitals for the past 24 hrs:   BP Temp Temp src Pulse Resp SpO2 Weight   24 0800 127/62 98.8 °F (37.1 °C) Temporal 82 13 99 % --   24 0700 119/60 -- -- 83 24 98 % --   24 0600 127/78 -- -- 100 22 95 % 239 lb 13.8 oz (108.8 kg)   24 0500 113/68 -- -- 87 17 95 % --   24 0400 120/61 98.8 °F (37.1 °C) Temporal 91 14 95 % --   24 0300 131/64 -- -- 76 14 95 % --   24 0200 121/65 -- -- 76 17 97 % --   24 0100 105/59 -- -- 71 14 97 % --   24 0000 116/62 97.2 °F (36.2 °C) Temporal 77 13 97 % --   24 2300 (!) 108/93 -- -- 78 22 98 % --   24 2200 105/67 -- -- 76 16 99 % --   24 2100 94/59 -- -- 75 21 98 % --   24 2000 91/55 97.6 °F (36.4 °C) -- 64 19 94 % --   24 1900 95/51 -- -- 71 14 98 % --   24 1800 95/59 -- -- 64 12 98 % --   24 1700 91/53 -- -- 68 20 96 % --   24 1600 92/57 98.7 °F (37.1 °C) Temporal 63 21 100 % --   24 1500 90/59 -- -- 73 20 100 % --   24 1400 99/55 -- -- 66 12 96 % --   24 1300 103/54 -- -- 63 17 100 % --   24 1200 105/55 97.7 °F (36.5 °C) Temporal 65 16 100 % --   24 1100 101/52 -- -- 66 13 98 % --       Intake/Output:   Last 3 shifts:   Intake/Output                   24 07 - 24 0659 24 07 - 24 0659 24 07 - 24 0659       Intake    I.V.  762.4  1506.5  725    I.V. 611.9 998 --    Volume (mL) Insulin 11.9 17.4 26.9    Volume (mL) Propofol 42.6 22 --    Volume (mL) Amiodarone -- 206 72.8    Volume (mL) Dexmedetomidine 96 132.1 --     Volume (mL) (dextrose 5%-sodium chloride 0.9% infusion) -- 131 625.3    NG/GT  40  250  --    Intake (mL) ([REMOVED] NG/OG Tube Orogastric 16 Fr Center mouth) 40 250 --    IV PIGGYBACK  100  200  --    Volume (mL) (acetaminophen (Ofirmev) 10 mg/mL infusion premix 1,000 mg) -- 100 --    Volume (mL) (potassium chloride 20 mEq/100mL IVPB premix 20 mEq) 100 100 --    Total Intake 902.4 1956.5 725       Output    Urine  1055  810  195    Output (mL) (Urethral Catheter Double-lumen;Latex;Temperature probe) 1055 810 195    Emesis/NG output  200  50  --    Output (mL) ([REMOVED] NG/OG Tube Orogastric 16 Fr Center mouth) 200 50 --    Chest Tube  140  810  160    Output (mL) (Y Chest Tube 1 and 2 1 Right Pericardial 32 Fr. 2 Left Pericardial) 80 740 140    Output (mL) (Chest Tube 1 Mediastinal 24 Fr.) 60 70 20    Total Output 1395 1670 355       Net I/O     -492.6 286.5 370             Vent Settings:      Hemodynamic parameters (last 24 hours):      Scheduled Meds:    clonazePAM  0.5 mg Oral q6h    docusate  100 mg Per G Tube BID    aspirin  81 mg Per G Tube Daily    amiodarone  200 mg Oral TID CC    pantoprazole  40 mg Intravenous Q12H    metoclopramide  10 mg Intravenous Q6H    metoprolol tartrate  25 mg Oral 2x Daily(Beta Blocker)    mupirocin  1 Application Nasal BID    chlorhexidine gluconate  15 mL Mouth/Throat BID    multivitamin  1 tablet Oral Daily    ascorbic acid  500 mg Oral TID    enoxaparin  40 mg Subcutaneous Daily    sennosides  8.6 mg Oral BID       Continuous Infusions:    amiodarone Stopped (03/25/24 1032)    dextrose 5%-sodium chloride 0.9% 30 mL/hr (03/25/24 0830)    DOBUTamine Stopped (03/23/24 0900)    nitroGLYCERIN in dextrose 5% Stopped (03/22/24 1700)    norepinephrine      dexmedetomidine Stopped (03/24/24 1609)    insulin regular 2 Units/hr (03/25/24 1000)    propofol Stopped (03/24/24 0900)       Results:     Lab Results   Component Value Date    WBC 16.0 (H) 03/25/2024    HGB 9.7 (L)  03/25/2024    HCT 29.4 (L) 03/25/2024    .0 (L) 03/25/2024    CREATSERUM 0.72 03/25/2024    BUN 20 03/25/2024     (H) 03/25/2024    K 3.5 03/25/2024     (H) 03/25/2024    CO2 20.0 (L) 03/25/2024     (H) 03/25/2024    CA 7.8 (L) 03/25/2024    ALB 3.7 03/30/2023    ALKPHO 74 03/30/2023    BILT 0.5 03/30/2023    TP 7.4 03/30/2023    AST 21 03/30/2023    ALT 26 03/30/2023    PTT 37.3 (H) 03/21/2024    INR 1.50 (H) 03/21/2024    TSH 0.643 03/24/2024    PSA 4.0 12/05/2017    DDIMER >20.00 (H) 03/21/2024    MG 1.9 03/24/2024       Recent Labs   Lab 03/23/24  0501 03/24/24  0354 03/25/24  0401   * 142* 117*   BUN 17 20 20   CREATSERUM 0.80 0.63* 0.72   CA 8.1* 7.9* 7.8*    146* 147*   K 3.9 4.0 3.5   * 117* 119*   CO2 24.0 24.0 20.0*     Recent Labs   Lab 03/23/24  0501 03/24/24  0354 03/25/24  0401   RBC 3.49* 3.34* 3.00*   HGB 11.1* 10.5* 9.7*   HCT 32.4* 31.4* 29.4*   MCV 92.8 94.0 98.0   MCH 31.8 31.4 32.3   MCHC 34.3 33.4 33.0   RDW 14.7 14.7 14.4   NEPRELIM 12.42* 12.37* 12.81*   WBC 15.3* 15.2* 16.0*   .0* 98.0* 104.0*       No results for input(s): \"BNPML\" in the last 168 hours.    No results for input(s): \"TROP\", \"CK\" in the last 168 hours.    XR CHEST AP PORTABLE  (CPT=71045)    Result Date: 3/24/2024  CONCLUSION:   Persistent low lung volumes.  Question small bilateral pleural effusions.  The [place]-Saba catheter has been removed.  Multiple additional lines and tubes are unchanged.    Dictated by (CST): Cristóbal Donaldson MD on 3/24/2024 at 9:13 AM     Finalized by (CST): Cristóbal Donaldson MD on 3/24/2024 at 9:15 AM         EKG 12 Lead    Result Date: 3/24/2024  Atrial fibrillation Right bundle branch block Abnormal ECG When compared with ECG of 22-MAR-2024 06:23, Atrial fibrillation is noted Confirmed by DINESH MARI EVANS ([id number]) on 3/24/2024 11:44:12 PM          Exam:     Physical Exam:  General: Alert and oriented x 3. No apparent distress.   HEENT: Normocephalic,  anicteric sclera, neck supple, no thyromegaly or adenopathy.  Neck: No JVD, carotids 2+, no bruits.  Cardiac: Regular rate and rhythm. S1, S2 normal. No murmur, pericardial rub, S3, or extra cardiac sounds.  Lungs: Clear without wheezes, rales, rhonchi or dullness.  Normal excursions and effort.  Abdomen: Soft, non-tender. No organosplenomegally, mass or rebound, BS-present.  Extremities: Without clubbing or cyanosis. No edema.    Neurologic: Alert and oriented, normal affect. No focal defects  Skin: Warm and dry.     Assessment    Acute type A aortic dissection along with ascending aortic aneurysm  Status post repair 2/22/2024  Extubated  Paroxysmal A-fib  Hypertension under control     Plan:  Off IV Amiodarone  Amiodarone 200 mg 3 times a day  Aspirin 81 mg daily  Metoprolol heart rate 25 mg twice a day    Juan Manuel Vance MD  [facility]  3/25/2024  L3

## 2024-03-25 NOTE — SLP NOTE
ADULT SWALLOWING EVALUATION    ASSESSMENT    ASSESSMENT/OVERALL IMPRESSION:  PPE REQUIRED. THIS THERAPIST WORE GLOVES, DROPLET MASK, AND GOGGLES FOR DURATION OF EVALUATION. HANDS WASHED UPON ENTRANCE/EXIT.      SLP BSSE orders received and acknowledged. A swallow evaluation warranted as pt at risk for aspiration s/p extubation. Pt afebrile with clear vocal quality, on 4L/Min, with oxygen saturation at 97. Pt with no prior hx of dysphagia at [facility]. Pt positioned upright in bedside chair. Pt with no complaints of pain, RN aware. Pt with adequate oral acceptance and bilabial seal across all trials. Pt with intact bite, mastication of solids, and timely A-P transit. Pt's swallow response appears timely with adequate hyolaryngeal elevation/excursion. No clinical signs of aspiration (e.g., immediate/delayed throat clear, immediate/delayed cough, wet vocal quality, increased O2 effort) observed across all trials. Oxygen status remained >96 t/o the entire evaluation.     At this time, pt presents with adequate oral and pharyngeal phase for safe oral intake. Recommend a regular diet and thin liquids with strict adherence to safe swallowing compensatory strategies. Results and recommendations reviewed with RN, pt, and family. Pt/pt's family v/u to all results/recommendations. Recommendations remain written on whiteboard.     PLAN: SLP to f/u x1-2 meal assessments, monitor CXR, and VFSS if clinically warranted.     RECOMMENDATIONS   Diet Recommendations - Solids: Regular  Diet Recommendations - Liquids: Thin Liquids    Compensatory Strategies Recommended: No straws;Slow rate;Alternate consistencies;Small bites and sips  Aspiration Precautions: Upright position;Slow rate;Small bites and sips;No straw  Medication Administration Recommendations: One pill at a time  Treatment Plan/Recommendations: Aspiration precautions    HISTORY   MEDICAL HISTORY  Reason for Referral: R/O aspiration    Problem List  Principal Problem:     Dissection of thoracoabdominal aorta (HCC)      Past Medical History  Past Medical History:   Diagnosis Date    Anxiety     Smoker        Prior Living Situation: Home with spouse  Diet Prior to Admission: Regular;Thin liquids  Precautions: Aspiration    Patient/Family Goals: pt is thirsty    SWALLOWING HISTORY  Current Diet Consistency: NPO  Dysphagia History: None at [facility]    Imaging Results:     CXR 3/24/24:  CONCLUSION:      Persistent low lung volumes.  Question small bilateral pleural effusions.      The [place]-Saba catheter has been removed.  Multiple additional lines and tubes are unchanged.            Dictated by (CST): Cristóbal Donaldson MD on 3/24/2024 at 9:13 AM       Finalized by (CST): Cristóbal Donaldson MD on 3/24/2024 at 9:15 AM     OBJECTIVE   ORAL MOTOR EXAMINATION  Dentition: Natural;Functional  Symmetry: Within Functional Limits  Strength: Within Functional Limits     Range of Motion: Within Functional Limits  Rate of Motion: Within Functional Limits    Voice Quality: Clear  Respiratory Status: Supplemental O2;Nasal cannula;Recently extubated  Consistencies Trialed: Thin liquids;Puree;Hard solid  Method of Presentation: Self presentation;Staff/Clinician assistance;Spoon;Cup;Single sips  Patient Positioning: Upright;Midline    Oral Phase of Swallow: Within Functional Limits      Pharyngeal Phase of Swallow: Within Functional Limits           (Please note: Silent aspiration cannot be evaluated clinically. Videofluoroscopic Swallow Study is required to rule-out silent aspiration.)    Esophageal Phase of Swallow: No complaints consistent with possible esophageal involvement    GOALS  Goal #1 The patient will tolerate regular consistency and thin liquids without overt signs or symptoms of aspiration with 100 % accuracy over 1-2 session(s).  In Progress   Goal #2 The patient/family/caregiver will demonstrate understanding and implementation of aspiration precautions and swallow strategies independently over 1-2  session(s).    In Progress     FOLLOW UP  Treatment Plan/Recommendations: Aspiration precautions  Number of Visits to Meet Established Goals:  (1-2)  Follow Up Needed (Documentation Required): Yes  SLP Follow-up Date: 03/26/24    Thank you for your referral.   If you have any questions, please contact MICK Limon M.S. CCC-SLP  Speech Language Pathologist  Phone Number [phone]

## 2024-03-25 NOTE — CM/SW NOTE
Anticipated therapy need: Intensive Therapy Program.    CM reached out to APRN and Attending to see if they wanted to see how patient progresses or order a PMR now at this time.     CM did not hear back on status update.  Will continue to follow to see how Talib progresses.    / to remain available for support and/or discharge planning.      Marylou Zuniga MBA BSN RN CRRN   RN Case Manager  [phone]

## 2024-03-25 NOTE — PLAN OF CARE
Pt anxious during the night and needed a lot of reassurance.He remains on an Insulin and Amiodarone gtt.He  Complained of \"Neuropathy\" in his feet which he has at home but is not taking anything for it.He also c/o Gas pains.He had adequate u/o .Pt had a 600ML dump from his Pleural CT when he got up to the chair.He went in and out of AFib.Currently he is in his regular rhythm of SR w/1st degree AVB.          Problem: Patient Centered Care  Goal: Patient preferences are identified and integrated in the patient's plan of care  Description: Interventions:  - What would you like us to know as we care for you? From home with spouse Lucie, generally healthy overall  - Provide timely, complete, and accurate information to patient/family  - Incorporate patient and family knowledge, values, beliefs, and cultural backgrounds into the planning and delivery of care  - Encourage patient/family to participate in care and decision-making at the level they choose  - Honor patient and family perspectives and choices  Outcome: Progressing     Problem: Patient/Family Goals  Goal: Patient/Family Short Term Goal  Description: Patient's Short Term Goal: recover in immediate post-op period, safe BP and hemodynamic stability - goals discussed w/ wife at bedside while patient intubated/sedated    Interventions:   - maintain intubation/sedation overnight for POD#0  - continue monitoring via swan/aimee throughout POD#0-1  - See additional Care Plan goals for specific interventions  Outcome: Progressing     Problem: CARDIOVASCULAR - ADULT  Goal: Maintains optimal cardiac output and hemodynamic stability  Description: INTERVENTIONS:  - Monitor vital signs, rhythm, and trends  - Monitor for bleeding, hypotension and signs of decreased cardiac output  - Evaluate effectiveness of vasoactive medications to optimize hemodynamic stability  - Monitor arterial and/or venous puncture sites for bleeding and/or hematoma  - Assess quality of pulses,  skin color and temperature  - Assess for signs of decreased coronary artery perfusion - ex. Angina  - Evaluate fluid balance, assess for edema, trend weights  Outcome: Progressing  Goal: Absence of cardiac arrhythmias or at baseline  Description: INTERVENTIONS:  - Continuous cardiac monitoring, monitor vital signs, obtain 12 lead EKG if indicated  - Evaluate effectiveness of antiarrhythmic and heart rate control medications as ordered  - Initiate emergency measures for life threatening arrhythmias  - Monitor electrolytes and administer replacement therapy as ordered  Outcome: Progressing     Problem: RESPIRATORY - ADULT  Goal: Achieves optimal ventilation and oxygenation  Description: INTERVENTIONS:  - Assess for changes in respiratory status  - Assess for changes in mentation and behavior  - Position to facilitate oxygenation and minimize respiratory effort  - Oxygen supplementation based on oxygen saturation or ABGs  - Provide Smoking Cessation handout, if applicable  - Encourage broncho-pulmonary hygiene including cough, deep breathe, Incentive Spirometry  - Assess the need for suctioning and perform as needed  - Assess and instruct to report SOB or any respiratory difficulty  - Respiratory Therapy support as indicated  - Manage/alleviate anxiety  - Monitor for signs/symptoms of CO2 retention  Outcome: Progressing     Problem: GASTROINTESTINAL - ADULT  Goal: Minimal or absence of nausea and vomiting  Description: INTERVENTIONS:  - Maintain adequate hydration with IV or PO as ordered and tolerated  - Nasogastric tube to low intermittent suction as ordered  - Evaluate effectiveness of ordered antiemetic medications  - Provide nonpharmacologic comfort measures as appropriate  - Advance diet as tolerated, if ordered  - Obtain nutritional consult as needed  - Evaluate fluid balance  Outcome: Progressing  Goal: Maintains or returns to baseline bowel function  Description: INTERVENTIONS:  - Assess bowel function  -  Maintain adequate hydration with IV or PO as ordered and tolerated  - Evaluate effectiveness of GI medications  - Encourage mobilization and activity  - Obtain nutritional consult as needed  - Establish a toileting routine/schedule  - Consider collaborating with pharmacy to review patient's medication profile  Outcome: Progressing     Problem: METABOLIC/FLUID AND ELECTROLYTES - ADULT  Goal: Hemodynamic stability and optimal renal function maintained  Description: INTERVENTIONS:  - Monitor labs and assess for signs and symptoms of volume excess or deficit  - Monitor intake, output and patient weight  - Monitor urine specific gravity, serum osmolarity and serum sodium as indicated or ordered  - Monitor response to interventions for patient's volume status, including labs, urine output, blood pressure (other measures as available)  - Encourage oral intake as appropriate  - Instruct patient on fluid and nutrition restrictions as appropriate  Outcome: Progressing  Goal: Glucose maintained within prescribed range  Description: INTERVENTIONS:  - Monitor Blood Glucose as ordered  - Assess for signs and symptoms of hyperglycemia and hypoglycemia  - Administer ordered medications to maintain glucose within target range  - Assess barriers to adequate nutritional intake and initiate nutrition consult as needed  - Instruct patient on self management of diabetes  Outcome: Progressing  Goal: Electrolytes maintained within normal limits  Description: INTERVENTIONS:  - Monitor labs and rhythm and assess patient for signs and symptoms of electrolyte imbalances  - Administer electrolyte replacement as ordered  - Monitor response to electrolyte replacements, including rhythm and repeat lab results as appropriate  - Fluid restriction as ordered  - Instruct patient on fluid and nutrition restrictions as appropriate  Outcome: Progressing     Problem: HEMATOLOGIC - ADULT  Goal: Maintains hematologic stability  Description: INTERVENTIONS  -  Assess for signs and symptoms of bleeding or hemorrhage  - Monitor labs and vital signs for trends  - Administer supportive blood products/factors, fluids and medications as ordered and appropriate  - Administer supportive blood products/factors as ordered and appropriate  Outcome: Progressing     Problem: SKIN/TISSUE INTEGRITY - ADULT  Goal: Incision(s), wounds(s) or drain site(s) healing without S/S of infection  Description: INTERVENTIONS:  - Assess and document risk factors for pressure ulcer development  - Assess and document skin integrity  - Assess and document dressing/incision, wound bed, drain sites and surrounding tissue  - Implement wound care per orders  - Initiate isolation precautions as appropriate  - Initiate Pressure Ulcer prevention bundle as indicated  Outcome: Progressing     Problem: Impaired Swallowing  Goal: Minimize aspiration risk  Description: Interventions:  - Patient should be alert and upright for all feedings (90 degrees preferred)  - Offer food and liquids at a slow rate  - No straws  - Encourage small bites of food and small sips of liquid  - Offer pills one at a time, crush or deliver with applesauce as needed  - Discontinue feeding and notify MD (or speech pathologist) if coughing or persistent throat clearing or wet/gurgly vocal quality is noted  Outcome: Progressing     Problem: SAFETY ADULT - FALL  Goal: Free from fall injury  Description: INTERVENTIONS:  - Assess pt frequently for physical needs  - Identify cognitive and physical deficits and behaviors that affect risk of falls.  - [place] fall precautions as indicated by assessment.  - Educate pt/family on patient safety including physical limitations  - Instruct pt to call for assistance with activity based on assessment  - Modify environment to reduce risk of injury  - Provide assistive devices as appropriate  - Consider OT/PT consult to assist with strengthening/mobility  - Encourage toileting schedule  Outcome:  Progressing

## 2024-03-26 LAB
ANION GAP SERPL CALC-SCNC: 6 MMOL/L (ref 0–18)
BASOPHILS # BLD AUTO: 0.03 X10(3) UL (ref 0–0.2)
BASOPHILS NFR BLD AUTO: 0.2 %
BUN BLD-MCNC: 19 MG/DL (ref 9–23)
BUN/CREAT SERPL: 22.4 (ref 10–20)
CALCIUM BLD-MCNC: 8.5 MG/DL (ref 8.7–10.4)
CHLORIDE SERPL-SCNC: 116 MMOL/L (ref 98–112)
CO2 SERPL-SCNC: 26 MMOL/L (ref 21–32)
CREAT BLD-MCNC: 0.85 MG/DL
DEPRECATED RDW RBC AUTO: 50.8 FL (ref 35.1–46.3)
EGFRCR SERPLBLD CKD-EPI 2021: 92 ML/MIN/1.73M2 (ref 60–?)
EOSINOPHIL # BLD AUTO: 0.27 X10(3) UL (ref 0–0.7)
EOSINOPHIL NFR BLD AUTO: 1.8 %
ERYTHROCYTE [DISTWIDTH] IN BLOOD BY AUTOMATED COUNT: 14.2 % (ref 11–15)
GLUCOSE BLD-MCNC: 122 MG/DL (ref 70–99)
GLUCOSE BLDC GLUCOMTR-MCNC: 134 MG/DL (ref 70–99)
GLUCOSE BLDC GLUCOMTR-MCNC: 136 MG/DL (ref 70–99)
HCT VFR BLD AUTO: 29.2 %
HGB BLD-MCNC: 9.6 G/DL
IMM GRANULOCYTES # BLD AUTO: 0.07 X10(3) UL (ref 0–1)
IMM GRANULOCYTES NFR BLD: 0.5 %
LYMPHOCYTES # BLD AUTO: 1.69 X10(3) UL (ref 1–4)
LYMPHOCYTES NFR BLD AUTO: 11.1 %
MCH RBC QN AUTO: 32.1 PG (ref 26–34)
MCHC RBC AUTO-ENTMCNC: 32.9 G/DL (ref 31–37)
MCV RBC AUTO: 97.7 FL
MONOCYTES # BLD AUTO: 1.25 X10(3) UL (ref 0.1–1)
MONOCYTES NFR BLD AUTO: 8.2 %
NEUTROPHILS # BLD AUTO: 11.9 X10 (3) UL (ref 1.5–7.7)
NEUTROPHILS # BLD AUTO: 11.9 X10(3) UL (ref 1.5–7.7)
NEUTROPHILS NFR BLD AUTO: 78.2 %
OSMOLALITY SERPL CALC.SUM OF ELEC: 310 MOSM/KG (ref 275–295)
PLATELET # BLD AUTO: 113 10(3)UL (ref 150–450)
POTASSIUM SERPL-SCNC: 3.9 MMOL/L (ref 3.5–5.1)
RBC # BLD AUTO: 2.99 X10(6)UL
SODIUM SERPL-SCNC: 148 MMOL/L (ref 136–145)
WBC # BLD AUTO: 15.2 X10(3) UL (ref 4–11)

## 2024-03-26 PROCEDURE — 99233 SBSQ HOSP IP/OBS HIGH 50: CPT | Performed by: HOSPITALIST

## 2024-03-26 PROCEDURE — 99232 SBSQ HOSP IP/OBS MODERATE 35: CPT | Performed by: INTERNAL MEDICINE

## 2024-03-26 RX ORDER — AMIODARONE HYDROCHLORIDE 200 MG/1
200 TABLET ORAL 2 TIMES DAILY WITH MEALS
Status: DISCONTINUED | OUTPATIENT
Start: 2024-03-30 | End: 2024-03-28

## 2024-03-26 RX ORDER — AMIODARONE HYDROCHLORIDE 200 MG/1
200 TABLET ORAL DAILY
Status: DISCONTINUED | OUTPATIENT
Start: 2024-04-06 | End: 2024-03-28

## 2024-03-26 NOTE — PHYSICAL THERAPY NOTE
PHYSICAL THERAPY TREATMENT NOTE - INPATIENT     Room Number: 234/234-A       Presenting Problem: aortic dissection status postsurgical repair on 3/22/24  Co-Morbidities : Anxiety    Problem List  Principal Problem:    Dissection of thoracoabdominal aorta (HCC)      PHYSICAL THERAPY ASSESSMENT   Patient demonstrates good  progress this session, goals  remain in progress.    Patient continues to function below baseline with bed mobility, transfers, gait, stair negotiation, maintaining seated position, standing prolonged periods, and performing household tasks.  Contributing factors to remaining limitations include decreased functional strength, decreased endurance/aerobic capacity, impaired dynamic standing balance, decreased muscular endurance, and medical status.  Next session anticipate patient to progress bed mobility, transfers, gait, stair negotiation, maintaining seated position, standing prolonged periods, and performing household tasks.  Physical Therapy will continue to follow patient for duration of hospitalization.    Patient continues to benefit from continued skilled PT services: to facilitate return to prior level of function as patient demonstrates high motivation with excellent tolerance to an intensive therapy program .    PLAN  PT Treatment Plan: Bed mobility;Body mechanics;Energy conservation;Endurance;Patient education;Family education;Gait training;Stoop training;Stair training;Strengthening;Transfer training;Balance training;Range of motion  Frequency (Obs): Daily    SUBJECTIVE  \"Thank you so much!\"    OBJECTIVE  Precautions: Sternal;Chest tube    PAIN ASSESSMENT   Ratin  Location: incisional  Management Techniques: Activity promotion;Body mechanics;Repositioning    BALANCE  Static Sitting: Good  Dynamic Sitting: Fair +  Static Standing: Fair  Dynamic Standing: Fair -    ACTIVITY TOLERANCE  Pulse: 101 (127bpm with activity)  Heart Rate Source: Monitor  Resp: 26  BP: 137/69  BP Location: Left  arm  BP Method: Automatic  Patient Position: Sitting     O2 WALK  Oxygen Therapy  SPO2% on Room Air at Rest: 96  SPO2% Ambulation on Room Air: 100    AM-PAC '6-Clicks' INPATIENT SHORT FORM - BASIC MOBILITY  How much difficulty does the patient currently have...  Patient Difficulty: Turning over in bed (including adjusting bedclothes, sheets and blankets)?: A Little   Patient Difficulty: Sitting down on and standing up from a chair with arms (e.g., wheelchair, bedside commode, etc.): A Little   Patient Difficulty: Moving from lying on back to sitting on the side of the bed?: A Little   How much help from another person does the patient currently need...   Help from Another: Moving to and from a bed to a chair (including a wheelchair)?: A Little   Help from Another: Need to walk in hospital room?: A Little   Help from Another: Climbing 3-5 steps with a railing?: A Lot     AM-PAC Score:  Raw Score: 17   Approx Degree of Impairment: 50.57%   Standardized Score (AM-PAC Scale): 42.13   CMS Modifier (G-Code): CK    FUNCTIONAL ABILITY STATUS  Functional Mobility/Gait Assessment  Gait Assistance: Contact guard assist;Minimum assistance  Distance (ft): 50ft x 2  Assistive Device:  (Cardiac cart)  Pattern: Shuffle (decreased juliano speed, flexed posture, decreased step length. Cues for upright posture. Patient veering toward L, cues to ambulate a straight path. CG initially and Min as pt fatigued and with turns.)  Sit to Stand: minimal assist. Cues to widen base of support. Instruction on anterior<>posterior weight shift to gain momentum to stand. Upon standing, cue for upright posture and shoulder relaxation. CG to maintain static standing with UE support on cardiac cart.     Additional information: Patient received sitting in bedside chair with family present. RN approved activity. Coordinated session with OT. Therapist educated pt on POC and physiological benefits of mobilization. Cues for pacing, energy conservation and  pursed lipped breathing technique. Reviewed post operative sternal precautions- pt with good carryover. Introduced pt to rate of perceived exertion scale (RPE) and indication for rest breaks. Patient HR 101bpm at rest and 127bpm with activity; SpO2 % throughout, noted dyspnea with exertion, cues for standing rest breaks. RPE with ambulation: 6 out of 10    The patient's Approx Degree of Impairment: 50.57% has been calculated based on documentation in the [facility] '6 clicks' Inpatient Daily Activity Short Form.  Research supports that patients with this level of impairment may benefit from rehab.  Final disposition will be made by interdisciplinary medical team.      Patient End of Session: Up in chair;Needs met;Call light within reach;RN aware of session/findings;Family present    CURRENT GOALS   Goals to be met by: 4/8/24  Patient Goal Patient's self-stated goal is: return to PLOF   Goal #1 Patient is able to demonstrate supine - sit EOB @ level: supervision      Goal #1   Current Status  NT   Goal #2 Patient is able to demonstrate transfers Sit to/from Stand at assistance level: supervision with walker - rolling      Goal #2  Current Status  Min A x 1   Goal #3 Patient is able to ambulate 150 feet with assist device: walker - rolling at assistance level: supervision   Goal #3   Current Status  CG/Min  50ft x 2   Goal #4 Patient will negotiate curb w/ assistive device and supervision   Goal #4   Current Status  NT   Goal #5 Patient to demonstrate independence with home activity/exercise instructions provided to patient in preparation for discharge.   Goal #5   Current Status  ongoing   Goal #6 Patient will demonstrate compliance toward post operative sternal precautions 100% of time to optimize healing- comprehension will be assessed with pt verbalizing 8 out of 8 precautions.    Goal #6  Current Status  >75% of time     Gait Training: 15 minutes  Therapeutic Activity: 10 minutes

## 2024-03-26 NOTE — DISCHARGE PLANNING
Anticipated therapy need: Intensive Therapy Program    PMR ordered.  Will need follow-up.    AIR referrals sent.  Will need follow-up.    Possible AIR based on PMR recommendations and accepting facilities.    / to remain available for support and/or discharge planning.      Marylou MIKEA BSN RN CRRN   RN Case Manager  [phone]

## 2024-03-26 NOTE — PROGRESS NOTES
[facility]  part of [facility]     Progress Note    Talib Gonzalez Patient Status:  Inpatient    1950 MRN [id number]   Location [facility] 2W/SW Attending Keya Workman DO   Hosp Day # 5 PCP No primary care provider on file.       Subjective:   Seen and examined.  Sitting in chair.  Some mild discomfort around chest tube site.  Pain overall well-controlled.  Denies significant dyspnea    Objective:   Blood pressure 139/75, pulse 77, temperature 97.8 °F (36.6 °C), temperature source Temporal, resp. rate 17, height 6' (1.829 m), weight 205 lb 0.4 oz (93 kg), SpO2 93%.  Intake/Output:   Last 3 shifts: I/O last 3 completed shifts:  In: 1739.8 [I.V.:1539.8; IV PIGGYBACK:200]  Out: 2955 [Urine:1885; Chest Tube:1070]   This shift: I/O this shift:  In: -   Out: 180 [Urine:140; Chest Tube:40]     Vent Settings:      Hemodynamic parameters (last 24 hours):      Scheduled Meds:         Continuous Infusions:    amiodarone Stopped (24 1032)    DOBUTamine Stopped (24 0900)    nitroGLYCERIN in dextrose 5% Stopped (24 1700)    norepinephrine      dexmedetomidine Stopped (24 1609)    propofol Stopped (24 0900)       Physical Exam  Constitutional: no acute distress  Eyes: PERRL  ENT: nares pateint  Neck: supple, no JVD  Cardio: RRR, S1 S2  Respiratory: Basilar crackles  GI: abdomen soft, non tender, active bowel sounds, no organomegaly  Extremities: no clubbing, cyanosis, edema  Neurologic: no gross motor deficits  Skin: warm, dry      Results:     Lab Results   Component Value Date    WBC 15.2 2024    HGB 9.6 2024    HCT 29.2 2024    .0 2024    CREATSERUM 0.85 2024    BUN 19 2024     2024    K 3.9 2024     2024    CO2 26.0 2024     2024    CA 8.5 2024       No results found.          Assessment   1.  Type A aortic dissection POD #4 status post repair  2.  Acute  respiratory failure  3.  Carotid stenosis  4.  Anemia  5.  Thrombocytopenia     Plan   -Patient presents with evidence of sudden onset abdominal discomfort with some generalized malaise earlier this morning.  No significant chest pain or back pain per patient.  -CT abdomen pelvis and subsequent CT chest abdomen pelvis with evidence of extensive type B dissection extending from aortic root to bilateral common iliac arteries.  Mild wall thickening of the ascending colon secondary to changes likely secondary to early ischemia.  -Status post emergent repair of type A dissection on 3/21/2024.  -Tolerating extubation on 3/24/2024  -Chest tube management per CV surgery  -Up in chair.  Incentive spirometry.  -DVT prophylaxis: SCDs, Lovenox  -Discussed with family    Ruddy Rubi,   Pulmonary Critical Care Medicine  [facility]

## 2024-03-26 NOTE — PROGRESS NOTES
[facility]  part of [facility]    Progress Note    Talib Gonzalez Patient Status:  Inpatient    1950 MRN [id number]   Location [facility] 2W/SW Attending Keya Workman,    Hosp Day # 5 PCP No primary care provider on file.     Chief complaint aortic dissection     Subjective:   Talib Gonzalez is a(n) 73 year old male pt doing well today. Reports neuropathy for last 2 months. We discussed neurontin at [facility]     Objective:   Blood pressure 143/72, pulse 97, temperature 99.1 °F (37.3 °C), temperature source Oral, resp. rate 19, height 6' (1.829 m), weight 205 lb 0.4 oz (93 kg), SpO2 96%.      Intake/Output Summary (Last 24 hours) at 3/26/2024 1325  Last data filed at 3/26/2024 1228  Gross per 24 hour   Intake 12.1 ml   Output 2300 ml   Net -2287.9 ml       Patient Weight(s) for the past 336 hrs:   Weight   24 0615 205 lb 0.4 oz (93 kg)   24 0600 239 lb 13.8 oz (108.8 kg)   24 0600 239 lb 3.2 oz (108.5 kg)   24 0700 240 lb 8.4 oz (109.1 kg)   24 0208 216 lb 14.9 oz (98.4 kg)   24 1248 217 lb (98.4 kg)   24 1231 217 lb (98.4 kg)           General appearance: awake  Pulmonary:  clear to auscultation bilaterally  Cardiovascular: S1, S2 normal, no murmur, click, rub or gallop, regular rate and rhythm  Abdominal: soft, non-tender; bowel sounds normal; no masses,  no organomegaly, chest tubes in place  Extremities: extremities normal, atraumatic, no cyanosis or edema        Medicines:           Lab Results   Component Value Date    WBC 15.2 (H) 2024    HGB 9.6 (L) 2024    HCT 29.2 (L) 2024    .0 (L) 2024    CREATSERUM 0.85 2024    BUN 19 2024     (H) 2024    K 3.9 2024     (H) 2024    CO2 26.0 2024     (H) 2024    CA 8.5 (L) 2024    ALB 3.7 2023    ALKPHO 74 2023    BILT 0.5 2023    TP 7.4 2023    AST 21 2023    ALT  26 03/30/2023    PTT 37.3 (H) 03/21/2024    INR 1.50 (H) 03/21/2024    TSH 0.643 03/24/2024    PSA 4.0 12/05/2017    DDIMER >20.00 (H) 03/21/2024    MG 1.9 03/24/2024       No results found.        Results:     CBC:    Lab Results   Component Value Date    WBC 15.2 (H) 03/26/2024    WBC 16.0 (H) 03/25/2024    WBC 15.2 (H) 03/24/2024     Lab Results   Component Value Date    HGB 9.6 (L) 03/26/2024    HGB 9.7 (L) 03/25/2024    HGB 10.5 (L) 03/24/2024      Lab Results   Component Value Date    .0 (L) 03/26/2024    .0 (L) 03/25/2024    PLT 98.0 (L) 03/24/2024       Recent Labs   Lab 03/24/24  0354 03/25/24  0401 03/26/24  0501   * 117* 122*   BUN 20 20 19   CREATSERUM 0.63* 0.72 0.85   CA 7.9* 7.8* 8.5*   * 147* 148*   K 4.0 3.5 3.9   * 119* 116*   CO2 24.0 20.0* 26.0             Assessment and Plan:       Dissection of thoracoabdominal aorta (HCC)   S/p aortic dissection repair with 30 mm x 32 mm Hemashield graft secondary to 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 Cards on consult. Tele monitor.     Acute blood loss anemia - expected. Cont to monitor , cbc daily     Thrombocytopenia - monitor cbc daily     Hyperglycemia - cont iss     Hypernatremia - cont to push water     DVT ppx lovenox      PT/OT       Keya Workman,          Chart reviewed, including current vitals, notes, labs and imaging  Labs ordered and medications adjusted as outlined above  Coordinate care with care team/consultants  Discussed with patient results of tests, management plan as outlined above, and the need for ongoing hospitalization  D/w RN     MDM high          PHYSICIAN Certification of Need for Inpatient Hospitalization - Initial Certification    Patient will require inpatient services that will reasonably be expected to span two  midnight's based on the clinical documentation in H+P.   Based on patients current state of illness, I anticipate that, after discharge, patient will require TBD.

## 2024-03-26 NOTE — PLAN OF CARE
Cordis & A-Line removed. Amio gtt and Insulin gtt discontinued. Worked with PT/OT, ambulated in room x1, stand and march in place. SLP passed for diet and meds. Pain control. Titrated to RA. Maintained patient safety.   Problem: Patient Centered Care  Goal: Patient preferences are identified and integrated in the patient's plan of care  Description: Interventions:  - What would you like us to know as we care for you? From home with spouse Lucie, generally healthy overall  - Provide timely, complete, and accurate information to patient/family  - Incorporate patient and family knowledge, values, beliefs, and cultural backgrounds into the planning and delivery of care  - Encourage patient/family to participate in care and decision-making at the level they choose  - Honor patient and family perspectives and choices  Outcome: Progressing     Problem: Patient/Family Goals  Goal: Patient/Family Long Term Goal  Description: Patient's Long Term Goal: fully recover and return home with wife and family    Interventions:  - discharge planning when appropriate  - safe management during post-op period  - See additional Care Plan goals for specific interventions  Outcome: Progressing  Goal: Patient/Family Short Term Goal  Description: Patient's Short Term Goal: recover in immediate post-op period, safe BP and hemodynamic stability - goals discussed w/ wife at bedside while patient intubated/sedated    Interventions:   - maintain intubation/sedation overnight for POD#0  - continue monitoring via swan/aimee throughout POD#0-1  - See additional Care Plan goals for specific interventions  Outcome: Progressing     Problem: CARDIOVASCULAR - ADULT  Goal: Maintains optimal cardiac output and hemodynamic stability  Description: INTERVENTIONS:  - Monitor vital signs, rhythm, and trends  - Monitor for bleeding, hypotension and signs of decreased cardiac output  - Evaluate effectiveness of vasoactive medications to optimize hemodynamic  stability  - Monitor arterial and/or venous puncture sites for bleeding and/or hematoma  - Assess quality of pulses, skin color and temperature  - Assess for signs of decreased coronary artery perfusion - ex. Angina  - Evaluate fluid balance, assess for edema, trend weights  Outcome: Progressing  Goal: Absence of cardiac arrhythmias or at baseline  Description: INTERVENTIONS:  - Continuous cardiac monitoring, monitor vital signs, obtain 12 lead EKG if indicated  - Evaluate effectiveness of antiarrhythmic and heart rate control medications as ordered  - Initiate emergency measures for life threatening arrhythmias  - Monitor electrolytes and administer replacement therapy as ordered  Outcome: Progressing     Problem: RESPIRATORY - ADULT  Goal: Achieves optimal ventilation and oxygenation  Description: INTERVENTIONS:  - Assess for changes in respiratory status  - Assess for changes in mentation and behavior  - Position to facilitate oxygenation and minimize respiratory effort  - Oxygen supplementation based on oxygen saturation or ABGs  - Provide Smoking Cessation handout, if applicable  - Encourage broncho-pulmonary hygiene including cough, deep breathe, Incentive Spirometry  - Assess the need for suctioning and perform as needed  - Assess and instruct to report SOB or any respiratory difficulty  - Respiratory Therapy support as indicated  - Manage/alleviate anxiety  - Monitor for signs/symptoms of CO2 retention  Outcome: Progressing     Problem: GASTROINTESTINAL - ADULT  Goal: Minimal or absence of nausea and vomiting  Description: INTERVENTIONS:  - Maintain adequate hydration with IV or PO as ordered and tolerated  - Nasogastric tube to low intermittent suction as ordered  - Evaluate effectiveness of ordered antiemetic medications  - Provide nonpharmacologic comfort measures as appropriate  - Advance diet as tolerated, if ordered  - Obtain nutritional consult as needed  - Evaluate fluid balance  Outcome:  Progressing  Goal: Maintains or returns to baseline bowel function  Description: INTERVENTIONS:  - Assess bowel function  - Maintain adequate hydration with IV or PO as ordered and tolerated  - Evaluate effectiveness of GI medications  - Encourage mobilization and activity  - Obtain nutritional consult as needed  - Establish a toileting routine/schedule  - Consider collaborating with pharmacy to review patient's medication profile  Outcome: Progressing     Problem: METABOLIC/FLUID AND ELECTROLYTES - ADULT  Goal: Hemodynamic stability and optimal renal function maintained  Description: INTERVENTIONS:  - Monitor labs and assess for signs and symptoms of volume excess or deficit  - Monitor intake, output and patient weight  - Monitor urine specific gravity, serum osmolarity and serum sodium as indicated or ordered  - Monitor response to interventions for patient's volume status, including labs, urine output, blood pressure (other measures as available)  - Encourage oral intake as appropriate  - Instruct patient on fluid and nutrition restrictions as appropriate  Outcome: Progressing  Goal: Glucose maintained within prescribed range  Description: INTERVENTIONS:  - Monitor Blood Glucose as ordered  - Assess for signs and symptoms of hyperglycemia and hypoglycemia  - Administer ordered medications to maintain glucose within target range  - Assess barriers to adequate nutritional intake and initiate nutrition consult as needed  - Instruct patient on self management of diabetes  Outcome: Progressing  Goal: Electrolytes maintained within normal limits  Description: INTERVENTIONS:  - Monitor labs and rhythm and assess patient for signs and symptoms of electrolyte imbalances  - Administer electrolyte replacement as ordered  - Monitor response to electrolyte replacements, including rhythm and repeat lab results as appropriate  - Fluid restriction as ordered  - Instruct patient on fluid and nutrition restrictions as  appropriate  Outcome: Progressing     Problem: HEMATOLOGIC - ADULT  Goal: Maintains hematologic stability  Description: INTERVENTIONS  - Assess for signs and symptoms of bleeding or hemorrhage  - Monitor labs and vital signs for trends  - Administer supportive blood products/factors, fluids and medications as ordered and appropriate  - Administer supportive blood products/factors as ordered and appropriate  Outcome: Progressing     Problem: SAFETY ADULT - FALL  Goal: Free from fall injury  Description: INTERVENTIONS:  - Assess pt frequently for physical needs  - Identify cognitive and physical deficits and behaviors that affect risk of falls.  - [place] fall precautions as indicated by assessment.  - Educate pt/family on patient safety including physical limitations  - Instruct pt to call for assistance with activity based on assessment  - Modify environment to reduce risk of injury  - Provide assistive devices as appropriate  - Consider OT/PT consult to assist with strengthening/mobility  - Encourage toileting schedule  Outcome: Progressing     Problem: DISCHARGE PLANNING  Goal: Discharge to home or other facility with appropriate resources  Description: INTERVENTIONS:  - Identify barriers to discharge w/pt and caregiver  - Include patient/family/discharge partner in discharge planning  - Arrange for needed discharge resources and transportation as appropriate  - Identify discharge learning needs (meds, wound care, etc)  - Arrange for interpreters to assist at discharge as needed  - Consider post-discharge preferences of patient/family/discharge partner  - Complete POLST form as appropriate  - Assess patient's ability to be responsible for managing their own health  - Refer to Case Management Department for coordinating discharge planning if the patient needs post-hospital services based on physician/LIP order or complex needs related to functional status, cognitive ability or social support system  Outcome:  Progressing     Problem: SKIN/TISSUE INTEGRITY - ADULT  Goal: Incision(s), wounds(s) or drain site(s) healing without S/S of infection  Description: INTERVENTIONS:  - Assess and document risk factors for pressure ulcer development  - Assess and document skin integrity  - Assess and document dressing/incision, wound bed, drain sites and surrounding tissue  - Implement wound care per orders  - Initiate isolation precautions as appropriate  - Initiate Pressure Ulcer prevention bundle as indicated  Outcome: Progressing     Problem: Impaired Swallowing  Goal: Minimize aspiration risk  Description: Interventions:  - Patient should be alert and upright for all feedings (90 degrees preferred)  - Offer food and liquids at a slow rate  - No straws  - Encourage small bites of food and small sips of liquid  - Offer pills one at a time, crush or deliver with applesauce as needed  - Discontinue feeding and notify MD (or speech pathologist) if coughing or persistent throat clearing or wet/gurgly vocal quality is noted  Outcome: Progressing

## 2024-03-26 NOTE — PROGRESS NOTES
[facility]  part of [facility]    Cardiology Progress Note    Talib Gonzalez Patient Status:  Inpatient    1950 MRN [id number]   Location [facility] 2W/ Attending Keya Workman DO   Hosp Day # 5 PCP No primary care provider on file.     Subjective   Awake  Sitting in a chair  No distress  Family by the bedside       Objective:   Patient Vitals for the past 24 hrs:   BP Temp Temp src Pulse Resp SpO2 Weight   24 0615 -- -- -- -- -- -- 205 lb 0.4 oz (93 kg)   24 0600 139/75 -- -- 77 17 93 % --   24 0500 (!) 135/91 -- -- 76 13 96 % --   24 0400 136/74 97.8 °F (36.6 °C) Temporal 73 16 93 % --   24 0200 133/76 -- -- 74 14 93 % --   24 0000 140/78 98.2 °F (36.8 °C) Temporal 77 18 92 % --   24 2200 124/65 -- -- 81 17 95 % --   24 2100 139/69 -- -- 79 17 95 % --   24 2000 122/64 97 °F (36.1 °C) Temporal 76 18 93 % --   24 1900 136/70 -- -- 80 15 93 % --   24 1800 121/77 -- -- 99 15 94 % --   24 1700 143/62 -- -- 82 17 94 % --   24 1600 132/63 97.4 °F (36.3 °C) Temporal 81 14 93 % --   24 1500 127/62 -- -- 83 12 97 % --   24 1400 133/85 -- -- 89 (!) 28 99 % --   24 1351 133/85 -- -- 86 22 -- --   24 1350 133/85 -- -- 86 -- -- --   24 1300 134/69 -- -- 81 22 99 % --   24 1200 137/78 98.1 °F (36.7 °C) Temporal 85 16 98 % --   24 1100 125/82 -- -- 86 18 98 % --   24 1000 137/73 -- -- 87 18 97 % --       Intake/Output:   Last 3 shifts:   Intake/Output                   24 0700 - 24 0659 24 07 - 24 0659 24 07 - 24 0659       Intake    I.V.  1506.5  827.8  --    I.V. 998 12.1 --    Volume (mL) Insulin 17.4 32.6 --    Volume (mL) Propofol 22 -- --    Volume (mL) Amiodarone 206 72.8 --    Volume (mL) Dexmedetomidine 132.1 -- --    Volume (mL) (dextrose 5%-sodium chloride 0.9% infusion) 131 710.3 --    NG/GT  250  --  --     Intake (mL) ([REMOVED] NG/OG Tube Orogastric 16 Fr Center mouth) 250 -- --    IV PIGGYBACK  200  200  --    Volume (mL) (acetaminophen (Ofirmev) 10 mg/mL infusion premix 1,000 mg) 100 100 --    Volume (mL) (potassium chloride 20 mEq/100mL IVPB premix 20 mEq) 100 100 --    Total Intake 1956.5 1027.8 --       Output    Urine  810  1500  140    Output (mL) (Urethral Catheter Double-lumen;Latex;Temperature probe) 810 1500 140    Emesis/NG output  50  --  --    Output (mL) ([REMOVED] NG/OG Tube Orogastric 16 Fr Center mouth) 50 -- --    Chest Tube  810  340  40    Output (mL) (Y Chest Tube 1 and 2 1 Right Pericardial 32 Fr. 2 Left Pericardial) 740 220 20    Output (mL) (Chest Tube 1 Mediastinal 24 Fr.) 70 120 20    Total Output 1670 1840 180       Net I/O     286.5 -812.3 -180                     Scheduled Meds:    clonazePAM  0.5 mg Oral q6h    docusate sodium  100 mg Oral BID    insulin aspart  1-5 Units Subcutaneous TID CC    aspirin  81 mg Per G Tube Daily    amiodarone  200 mg Oral TID CC    pantoprazole  40 mg Intravenous Q12H    metoclopramide  10 mg Intravenous Q6H    metoprolol tartrate  25 mg Oral 2x Daily(Beta Blocker)    chlorhexidine gluconate  15 mL Mouth/Throat BID    multivitamin  1 tablet Oral Daily    ascorbic acid  500 mg Oral TID    enoxaparin  40 mg Subcutaneous Daily    sennosides  8.6 mg Oral BID       Continuous Infusions:    amiodarone Stopped (03/25/24 1032)    DOBUTamine Stopped (03/23/24 0900)    nitroGLYCERIN in dextrose 5% Stopped (03/22/24 1700)    norepinephrine      dexmedetomidine Stopped (03/24/24 1609)    propofol Stopped (03/24/24 0900)       Results:     Lab Results   Component Value Date    WBC 15.2 (H) 03/26/2024    HGB 9.6 (L) 03/26/2024    HCT 29.2 (L) 03/26/2024    .0 (L) 03/26/2024    CREATSERUM 0.85 03/26/2024    BUN 19 03/26/2024     (H) 03/26/2024    K 3.9 03/26/2024     (H) 03/26/2024    CO2 26.0 03/26/2024     (H) 03/26/2024    CA 8.5 (L)  03/26/2024    ALB 3.7 03/30/2023    ALKPHO 74 03/30/2023    BILT 0.5 03/30/2023    TP 7.4 03/30/2023    AST 21 03/30/2023    ALT 26 03/30/2023    PTT 37.3 (H) 03/21/2024    INR 1.50 (H) 03/21/2024    TSH 0.643 03/24/2024    PSA 4.0 12/05/2017    DDIMER >20.00 (H) 03/21/2024    MG 1.9 03/24/2024       Recent Labs   Lab 03/24/24  0354 03/25/24  0401 03/26/24  0501   * 117* 122*   BUN 20 20 19   CREATSERUM 0.63* 0.72 0.85   CA 7.9* 7.8* 8.5*   * 147* 148*   K 4.0 3.5 3.9   * 119* 116*   CO2 24.0 20.0* 26.0     Recent Labs   Lab 03/24/24  0354 03/25/24  0401 03/26/24  0501   RBC 3.34* 3.00* 2.99*   HGB 10.5* 9.7* 9.6*   HCT 31.4* 29.4* 29.2*   MCV 94.0 98.0 97.7   MCH 31.4 32.3 32.1   MCHC 33.4 33.0 32.9   RDW 14.7 14.4 14.2   NEPRELIM 12.37* 12.81* 11.90*   WBC 15.2* 16.0* 15.2*   PLT 98.0* 104.0* 113.0*       No results for input(s): \"BNPML\" in the last 168 hours.    No results for input(s): \"TROP\", \"CK\" in the last 168 hours.    XR CHEST AP PORTABLE  (CPT=71045)    Result Date: 3/24/2024  CONCLUSION:   Persistent low lung volumes.  Question small bilateral pleural effusions.  The Phelps-Saba catheter has been removed.  Multiple additional lines and tubes are unchanged.    Dictated by (CST): Cristóbal Donaldson MD on 3/24/2024 at 9:13 AM     Finalized by (CST): Cristóbal Donaldson MD on 3/24/2024 at 9:15 AM                    Exam:     Physical Exam:  General: Alert and oriented x 3. No apparent distress.   HEENT: Normocephalic, anicteric sclera, neck supple, no thyromegaly or adenopathy.  Neck: No JVD, carotids 2+, no bruits.  Cardiac: Regular rate and rhythm. S1, S2 normal. No murmur, pericardial rub, S3, or extra cardiac sounds.  Lungs: Clear without wheezes, rales, rhonchi or dullness.  Chest tubes in place  Abdomen: Soft, non-tender. No organosplenomegally, mass or rebound, BS-present.  Extremities: Without clubbing or cyanosis. No edema.    Neurologic: Alert and oriented, normal affect. No focal  defects  Skin: Warm and dry.     Assessment and Plan:      Acute type A aortic dissection along with ascending aortic aneurysm  Status post repair 2/22/2024  Extubated  Paroxysmal A-fib  Hypertension     Plan:  Off IV Amiodarone  Amiodarone 200 mg 3 times a day  Aspirin 81 mg daily  Increase Metoprolol heart rate 25 mg three times a day     Juan Manuel Vance MD  [facility]  3/26/2024    L3

## 2024-03-26 NOTE — OCCUPATIONAL THERAPY NOTE
OCCUPATIONAL THERAPY TREATMENT NOTE - INPATIENT        Room Number: 234/234-A  Number of Visits to Meet Established Goals: 3  Presenting Problem: type a aortic dissection status postsurgical repair    Problem List  Principal Problem:    Dissection of thoracoabdominal aorta (HCC)      OCCUPATIONAL THERAPY ASSESSMENT   Patient demonstrates good  progress this session, goals remain in progress.    Patient continues to function below baseline with toileting, bathing, upper body dressing, lower body dressing, grooming, bed mobility, transfers, dynamic sitting balance, dynamic standing balance, functional standing tolerance, energy conservation strategies, and aerobic capacity.   Contributing factors to remaining limitations include decreased functional strength, decreased functional reach, decreased endurance, pain, impaired dynamic balance, decreased muscular endurance, and difficulty maintaining precautions.  Next session anticipate patient to progress toileting, bathing, upper body dressing, lower body dressing, grooming, bed mobility, transfers, dynamic sitting balance, dynamic standing balance, functional standing tolerance, and energy conservation strategies.  Occupational Therapy will continue to follow patient for duration of hospitalization.    Patient continues to benefit from continued skilled OT services: to facilitate return to prior level of function as patient demonstrates high motivation with excellent tolerance to an intensive therapy program .     PLAN  OT Treatment Plan: Balance activities;Energy conservation/work simplification techniques;IADL training;ADL training;Visual perceptual training;Functional transfer training;UE strengthening/ROM;Patient/Family education;Patient/Family training  OT Device Recommendations: TBD    SUBJECTIVE  \"I am proud of myself\"    OBJECTIVE  Precautions: Sternal;Chest tube    WEIGHT BEARING RESTRICTION     PAIN ASSESSMENT  Ratin  Location: chest  Management  Techniques: Activity promotion; Body mechanics; Repositioning; Nurse notified    ACTIVITY TOLERANCE  Pulse: 101 (127bpm with activity)  Heart Rate Source: Monitor  Resp: 26  BP: 137/69  BP Location: Left arm  BP Method: Automatic  Patient Position: Sitting     O2 SATURATIONS  Oxygen Therapy  SPO2% on Room Air at Rest: 96  SPO2% Ambulation on Room Air: 100       ACTIVITIES OF DAILY LIVING ASSESSMENT  AM-PAC ‘6-Clicks’ Inpatient Daily Activity Short Form  How much help from another person does the patient currently need…  -   Putting on and taking off regular lower body clothing?: A Lot  -   Bathing (including washing, rinsing, drying)?: A Lot  -   Toileting, which includes using toilet, bedpan or urinal? : A Little  -   Putting on and taking off regular upper body clothing?: A Lot  -   Taking care of personal grooming such as brushing teeth?: A Little  -   Eating meals?: None    AM-PAC Score:  Score: 16  Approx Degree of Impairment: 53.32%  Standardized Score (AM-PAC Scale): 35.96  CMS Modifier (G-Code): CK    FUNCTIONAL TRANSFER ASSESSMENT  Sit to Stand: Chair; Edge of Bed  Edge of Bed: Minimal Assist  Chair: Minimal Assist    BED MOBILITY  Sit to Supine (OT): Minimal Assist    BALANCE ASSESSMENT  Static Standing: Moderate Assist    FUNCTIONAL ADL ASSESSMENT  Eating: Stand-by Assist  Grooming Seated: Minimal Assist  Bathing Seated: Maximum Assist  UB Dressing Seated: Moderate Assist  LB Dressing Seated: Moderate Assist  Toileting Seated: Moderate Assist    THERAPEUTIC EXERCISE     Skilled Therapy Provided: Pt seen for skilled OT session to address pt's I and safety with functional mobility, t/fs, and ADLs. Pt edu provided re: role of OT, sternal precautions, activity tolerance, pacing, importance of OOB activity, and AE/DME for ADL completion. Pt demo'd understanding of all concepts covered. Pt requiring MIN A for functional t/fs and pacing. Pt continues to require MAX A for LBD. Continue skilled OT to address pt's  I and safety.     EDUCATION PROVIDED  Patient: Role of Occupational Therapy; Discharge Recommendations; Plan of Care; Adaptive Equipment Recommendations; Functional Transfer Techniques; DME Recommendations; Fall Prevention; Surgical Precautions; Posture/Positioning; Compensatory ADL Techniques  Patient's Response to Education: Verbalized Understanding; Returned Demonstration  Family/Caregiver: Role of Occupational Therapy; Discharge Recommendations; Plan of Care; DME Recommendations; Functional Transfer Techniques; Fall Prevention; Posture/Positioning; Energy Conservation; Compensatory ADL Techniques  Family/Caregiver's Response to Education: Returned Demonstration; Verbalized Understanding    The patient's Approx Degree of Impairment: 53.32% has been calculated based on documentation in the [facility] '6 clicks' Inpatient Daily Activity Short Form.  Research supports that patients with this level of impairment may benefit from IRF.  Final disposition will be made by interdisciplinary medical team.    Patient End of Session: In bed;Needs met;Call light within reach;RN aware of session/findings;Alarm set;Family present    OT Goals  Patients self stated goal is: To get stronger                 Patient will complete functional transfer with MOD I   Comment: Progressing    Patient will complete toileting with MOD I   Comment: Progressing    Patient will tolerate standing for 5 minutes in prep for adls with MOD I    Comment: Progressing    Patient will complete item retrieval with MOD I   Comment:Progressing               OT Session Time: 30 minutes  Thereupetic exercise: 15 minutes  Therapeutic Activity: 15 minutes      Consuelo Thompson MS, OTR/L

## 2024-03-26 NOTE — CDS QUERY
How to Answer this Query    1.) Click \"Edit Button\" on the toolbar  2.) Type an \"X\" in the bracket for the diagnosis that applies. (You may also add additional clinical details as you feel necessary to substantiate your response).  3.) Finally click \"Sign\" to complete response.    Thank you        Dear Doctor AJIT Rubi:     PLEASE (X) DIAGNOSIS     SELECTION BY PROVIDER ONLY  ( )  Acute respiratory failure  ( ) Intubated for Airway protection  ( )  Other (please specify): _________________  _____________________________________________________________________________  Clinical Indicators:  Remained  intubated on ventilator status post emergent repair pf  Acute Type A aortic dissection on 3-.  3-22, 3-23, 3-24   Dr. AJIT Rubi:   1.  Type A aortic dissection POD #1 status post repair  2.  Acute respiratory failure    3-23 Dr Garcia: Ventilator dependent postop   3-24 9:49am  extubated        If you have any questions, please contact Clinical :  Yolanda Sorto RN at [phone]     Thank You!     THIS FORM IS A PERMANENT PART OF THE MEDICAL RECORD

## 2024-03-26 NOTE — PROGRESS NOTES
[facility]    Progress Note    Talib Gonzalez Patient Status:  Inpatient    1950 MRN [id number]   Location [facility] 2W/SW Attending Keya Workman,    Hosp Day # 5 PCP No primary care provider on file.     Subjective:  Pt sitting in the chair w/his wife at bedside. He has no complaints and feels okay overall.     Objective:  /75 (BP Location: Right arm)   Pulse 77   Temp 97.8 °F (36.6 °C) (Temporal)   Resp 17   Ht 6' (1.829 m)   Wt 205 lb 0.4 oz (93 kg)   SpO2 93%   BMI 27.81 kg/m²     Temp (24hrs), Av.7 °F (36.5 °C), Min:97 °F (36.1 °C), Max:98.2 °F (36.8 °C)      Intake/Output:    Intake/Output Summary (Last 24 hours) at 3/26/2024 1058  Last data filed at 3/26/2024 1000  Gross per 24 hour   Intake 302.77 ml   Output 1845 ml   Net -1542.23 ml       Wt Readings from Last 3 Encounters:   24 205 lb 0.4 oz (93 kg)       Allergies:  Allergies   Allergen Reactions    Serotonin Reuptake Inhibitors DIARRHEA       Labs:  Lab Results   Component Value Date    WBC 15.2 2024    HGB 9.6 2024    HCT 29.2 2024    .0 2024    CREATSERUM 0.85 2024    BUN 19 2024     2024    K 3.9 2024     2024    CO2 26.0 2024     2024    CA 8.5 2024       Physical Exam:  Blood pressure 139/75, pulse 77, temperature 97.8 °F (36.6 °C), temperature source Temporal, resp. rate 17, height 6' (1.829 m), weight 205 lb 0.4 oz (93 kg), SpO2 93%.  General: NAD  Neck: No JVD  Lungs: faint crackles bilaterally and diminished  Milad drain=20cc/12 hours  Pleural chest tubes=20cc/12 hours- no air leak   Heart: RRR, S1, S2  Abdomen: Soft/Round, NT/ND, BS+x 4/+Flatus/+BM   Extremities: Warm, dry, trace LE edema bilat  Pulses: 1+ bilat DP  Skin: sternotomy drsg: CDI w/TPW Intact/Right femoral incision drsg: CDI   Neurological:  AAOx3, MAEW    Assessment/Plan:  S/P EMERGENT TYPE A  AORTIC DISSECTION REPAIR POD # 5  Encourage pulm toilet: IS - currently on room air lizzeth well.  + smoker: continued to encourage smoking cessation/explained importance- pt agrees to quit and is ready  Pain meds as needed  Increase activity-oob to chair and ambulate- working with PT/OT  Scds/Lovenox prophylaxis DVT prevention   Continues hemodynamically stable- change status to PCU today: Hospitalist following  No hx: DM- able to stop glucose checks and correction scale coverage  Swallow eval prior to oral intake due to prolonged intubation- follow recs  Expected post op volume overload: Lasix today. Suspect wgt discrepancies post op due to various scales used.Pt states pre op home fet=569 lbs.   Expected post op anemia w/o clinical significance/stable.   Hx: Anxiety- resumed home med regimen as pt described.   Intermittent post op AF- on Amio-currently SR  Discharge planning: pt lives w/his wife. Continue to eval as pt progresses. Goal is home.     Plan of care discussed w/patient/wife/RN and CV Surgeon: Dr. Darcy Curtis, APRN  3/26/2024  10:58 AM

## 2024-03-26 NOTE — PLAN OF CARE
Y-tube CT removed, pacer wires removed. Flipped back into Afib w/ pauses, rate controlled. Ambulated in hallway x3. Maintained patient safety.   Problem: Patient Centered Care  Goal: Patient preferences are identified and integrated in the patient's plan of care  Description: Interventions:  - What would you like us to know as we care for you? From home with spouse Lucie, generally healthy overall  - Provide timely, complete, and accurate information to patient/family  - Incorporate patient and family knowledge, values, beliefs, and cultural backgrounds into the planning and delivery of care  - Encourage patient/family to participate in care and decision-making at the level they choose  - Honor patient and family perspectives and choices  Outcome: Progressing     Problem: Patient/Family Goals  Goal: Patient/Family Long Term Goal  Description: Patient's Long Term Goal: fully recover and return home with wife and family    Interventions:  - discharge planning when appropriate  - safe management during post-op period  - See additional Care Plan goals for specific interventions  Outcome: Progressing  Goal: Patient/Family Short Term Goal  Description: Patient's Short Term Goal: recover in immediate post-op period, safe BP and hemodynamic stability - goals discussed w/ wife at bedside while patient intubated/sedated    Interventions:   - maintain intubation/sedation overnight for POD#0  - continue monitoring via swan/aimee throughout POD#0-1  - See additional Care Plan goals for specific interventions  Outcome: Progressing     Problem: CARDIOVASCULAR - ADULT  Goal: Maintains optimal cardiac output and hemodynamic stability  Description: INTERVENTIONS:  - Monitor vital signs, rhythm, and trends  - Monitor for bleeding, hypotension and signs of decreased cardiac output  - Evaluate effectiveness of vasoactive medications to optimize hemodynamic stability  - Monitor arterial and/or venous puncture sites for bleeding  and/or hematoma  - Assess quality of pulses, skin color and temperature  - Assess for signs of decreased coronary artery perfusion - ex. Angina  - Evaluate fluid balance, assess for edema, trend weights  Outcome: Progressing  Goal: Absence of cardiac arrhythmias or at baseline  Description: INTERVENTIONS:  - Continuous cardiac monitoring, monitor vital signs, obtain 12 lead EKG if indicated  - Evaluate effectiveness of antiarrhythmic and heart rate control medications as ordered  - Initiate emergency measures for life threatening arrhythmias  - Monitor electrolytes and administer replacement therapy as ordered  Outcome: Progressing     Problem: RESPIRATORY - ADULT  Goal: Achieves optimal ventilation and oxygenation  Description: INTERVENTIONS:  - Assess for changes in respiratory status  - Assess for changes in mentation and behavior  - Position to facilitate oxygenation and minimize respiratory effort  - Oxygen supplementation based on oxygen saturation or ABGs  - Provide Smoking Cessation handout, if applicable  - Encourage broncho-pulmonary hygiene including cough, deep breathe, Incentive Spirometry  - Assess the need for suctioning and perform as needed  - Assess and instruct to report SOB or any respiratory difficulty  - Respiratory Therapy support as indicated  - Manage/alleviate anxiety  - Monitor for signs/symptoms of CO2 retention  Outcome: Progressing     Problem: GASTROINTESTINAL - ADULT  Goal: Minimal or absence of nausea and vomiting  Description: INTERVENTIONS:  - Maintain adequate hydration with IV or PO as ordered and tolerated  - Nasogastric tube to low intermittent suction as ordered  - Evaluate effectiveness of ordered antiemetic medications  - Provide nonpharmacologic comfort measures as appropriate  - Advance diet as tolerated, if ordered  - Obtain nutritional consult as needed  - Evaluate fluid balance  Outcome: Progressing  Goal: Maintains or returns to baseline bowel function  Description:  INTERVENTIONS:  - Assess bowel function  - Maintain adequate hydration with IV or PO as ordered and tolerated  - Evaluate effectiveness of GI medications  - Encourage mobilization and activity  - Obtain nutritional consult as needed  - Establish a toileting routine/schedule  - Consider collaborating with pharmacy to review patient's medication profile  Outcome: Progressing     Problem: METABOLIC/FLUID AND ELECTROLYTES - ADULT  Goal: Hemodynamic stability and optimal renal function maintained  Description: INTERVENTIONS:  - Monitor labs and assess for signs and symptoms of volume excess or deficit  - Monitor intake, output and patient weight  - Monitor urine specific gravity, serum osmolarity and serum sodium as indicated or ordered  - Monitor response to interventions for patient's volume status, including labs, urine output, blood pressure (other measures as available)  - Encourage oral intake as appropriate  - Instruct patient on fluid and nutrition restrictions as appropriate  Outcome: Progressing  Goal: Glucose maintained within prescribed range  Description: INTERVENTIONS:  - Monitor Blood Glucose as ordered  - Assess for signs and symptoms of hyperglycemia and hypoglycemia  - Administer ordered medications to maintain glucose within target range  - Assess barriers to adequate nutritional intake and initiate nutrition consult as needed  - Instruct patient on self management of diabetes  Outcome: Progressing  Goal: Electrolytes maintained within normal limits  Description: INTERVENTIONS:  - Monitor labs and rhythm and assess patient for signs and symptoms of electrolyte imbalances  - Administer electrolyte replacement as ordered  - Monitor response to electrolyte replacements, including rhythm and repeat lab results as appropriate  - Fluid restriction as ordered  - Instruct patient on fluid and nutrition restrictions as appropriate  Outcome: Progressing     Problem: HEMATOLOGIC - ADULT  Goal: Maintains  hematologic stability  Description: INTERVENTIONS  - Assess for signs and symptoms of bleeding or hemorrhage  - Monitor labs and vital signs for trends  - Administer supportive blood products/factors, fluids and medications as ordered and appropriate  - Administer supportive blood products/factors as ordered and appropriate  Outcome: Progressing     Problem: SAFETY ADULT - FALL  Goal: Free from fall injury  Description: INTERVENTIONS:  - Assess pt frequently for physical needs  - Identify cognitive and physical deficits and behaviors that affect risk of falls.  - [place] fall precautions as indicated by assessment.  - Educate pt/family on patient safety including physical limitations  - Instruct pt to call for assistance with activity based on assessment  - Modify environment to reduce risk of injury  - Provide assistive devices as appropriate  - Consider OT/PT consult to assist with strengthening/mobility  - Encourage toileting schedule  Outcome: Progressing     Problem: DISCHARGE PLANNING  Goal: Discharge to home or other facility with appropriate resources  Description: INTERVENTIONS:  - Identify barriers to discharge w/pt and caregiver  - Include patient/family/discharge partner in discharge planning  - Arrange for needed discharge resources and transportation as appropriate  - Identify discharge learning needs (meds, wound care, etc)  - Arrange for interpreters to assist at discharge as needed  - Consider post-discharge preferences of patient/family/discharge partner  - Complete POLST form as appropriate  - Assess patient's ability to be responsible for managing their own health  - Refer to Case Management Department for coordinating discharge planning if the patient needs post-hospital services based on physician/LIP order or complex needs related to functional status, cognitive ability or social support system  Outcome: Progressing     Problem: SKIN/TISSUE INTEGRITY - ADULT  Goal: Incision(s), wounds(s) or  drain site(s) healing without S/S of infection  Description: INTERVENTIONS:  - Assess and document risk factors for pressure ulcer development  - Assess and document skin integrity  - Assess and document dressing/incision, wound bed, drain sites and surrounding tissue  - Implement wound care per orders  - Initiate isolation precautions as appropriate  - Initiate Pressure Ulcer prevention bundle as indicated  Outcome: Progressing     Problem: Impaired Swallowing  Goal: Minimize aspiration risk  Description: Interventions:  - Patient should be alert and upright for all feedings (90 degrees preferred)  - Offer food and liquids at a slow rate  - No straws  - Encourage small bites of food and small sips of liquid  - Offer pills one at a time, crush or deliver with applesauce as needed  - Discontinue feeding and notify MD (or speech pathologist) if coughing or persistent throat clearing or wet/gurgly vocal quality is noted  Outcome: Progressing

## 2024-03-27 LAB
ANION GAP SERPL CALC-SCNC: 6 MMOL/L (ref 0–18)
BASOPHILS # BLD AUTO: 0.03 X10(3) UL (ref 0–0.2)
BASOPHILS NFR BLD AUTO: 0.2 %
BUN BLD-MCNC: 18 MG/DL (ref 9–23)
BUN/CREAT SERPL: 23.1 (ref 10–20)
CALCIUM BLD-MCNC: 8.5 MG/DL (ref 8.7–10.4)
CHLORIDE SERPL-SCNC: 115 MMOL/L (ref 98–112)
CO2 SERPL-SCNC: 26 MMOL/L (ref 21–32)
CREAT BLD-MCNC: 0.78 MG/DL
DEPRECATED RDW RBC AUTO: 49.3 FL (ref 35.1–46.3)
EGFRCR SERPLBLD CKD-EPI 2021: 94 ML/MIN/1.73M2 (ref 60–?)
EOSINOPHIL # BLD AUTO: 0.45 X10(3) UL (ref 0–0.7)
EOSINOPHIL NFR BLD AUTO: 3.2 %
ERYTHROCYTE [DISTWIDTH] IN BLOOD BY AUTOMATED COUNT: 13.9 % (ref 11–15)
GLUCOSE BLD-MCNC: 119 MG/DL (ref 70–99)
HCT VFR BLD AUTO: 27.9 %
HGB BLD-MCNC: 9.2 G/DL
IMM GRANULOCYTES # BLD AUTO: 0.07 X10(3) UL (ref 0–1)
IMM GRANULOCYTES NFR BLD: 0.5 %
LYMPHOCYTES # BLD AUTO: 1.55 X10(3) UL (ref 1–4)
LYMPHOCYTES NFR BLD AUTO: 11.2 %
MCH RBC QN AUTO: 32.1 PG (ref 26–34)
MCHC RBC AUTO-ENTMCNC: 33 G/DL (ref 31–37)
MCV RBC AUTO: 97.2 FL
MONOCYTES # BLD AUTO: 1.12 X10(3) UL (ref 0.1–1)
MONOCYTES NFR BLD AUTO: 8.1 %
NEUTROPHILS # BLD AUTO: 10.63 X10 (3) UL (ref 1.5–7.7)
NEUTROPHILS # BLD AUTO: 10.63 X10(3) UL (ref 1.5–7.7)
NEUTROPHILS NFR BLD AUTO: 76.8 %
OSMOLALITY SERPL CALC.SUM OF ELEC: 307 MOSM/KG (ref 275–295)
PLATELET # BLD AUTO: 126 10(3)UL (ref 150–450)
PLATELETS.RETICULATED NFR BLD AUTO: 7 % (ref 0–7)
POTASSIUM SERPL-SCNC: 3.4 MMOL/L (ref 3.5–5.1)
POTASSIUM SERPL-SCNC: 3.5 MMOL/L (ref 3.5–5.1)
RBC # BLD AUTO: 2.87 X10(6)UL
SODIUM SERPL-SCNC: 147 MMOL/L (ref 136–145)
WBC # BLD AUTO: 13.9 X10(3) UL (ref 4–11)

## 2024-03-27 PROCEDURE — 99232 SBSQ HOSP IP/OBS MODERATE 35: CPT | Performed by: INTERNAL MEDICINE

## 2024-03-27 PROCEDURE — 99233 SBSQ HOSP IP/OBS HIGH 50: CPT | Performed by: HOSPITALIST

## 2024-03-27 RX ORDER — FERROUS SULFATE 325(65) MG
325 TABLET, DELAYED RELEASE (ENTERIC COATED) ORAL 2 TIMES DAILY WITH MEALS
Status: DISCONTINUED | OUTPATIENT
Start: 2024-03-27 | End: 2024-03-28

## 2024-03-27 RX ORDER — POTASSIUM CHLORIDE 1.5 G/1.58G
40 POWDER, FOR SOLUTION ORAL EVERY 4 HOURS
Status: COMPLETED | OUTPATIENT
Start: 2024-03-27 | End: 2024-03-27

## 2024-03-27 RX ORDER — METOPROLOL TARTRATE 50 MG/1
50 TABLET, FILM COATED ORAL
Status: DISCONTINUED | OUTPATIENT
Start: 2024-03-27 | End: 2024-03-28

## 2024-03-27 RX ORDER — DEXTROSE MONOHYDRATE 50 MG/ML
INJECTION, SOLUTION INTRAVENOUS CONTINUOUS
Status: DISCONTINUED | OUTPATIENT
Start: 2024-03-27 | End: 2024-03-28

## 2024-03-27 RX ORDER — POTASSIUM CHLORIDE 1.5 G/1.58G
40 POWDER, FOR SOLUTION ORAL EVERY 4 HOURS
Qty: 4 PACKET | Refills: 0 | Status: COMPLETED | OUTPATIENT
Start: 2024-03-27 | End: 2024-03-27

## 2024-03-27 NOTE — PROGRESS NOTES
Cardiology Progress Note    Talib Gonzalez Patient Status:  Inpatient    1950 MRN [id number]   Location [facility] 2W/SW Attending Cesar Cortez,*   Hosp Day # 6 PCP No primary care provider on file.     Interval Note:  Patient sitting in a chair  Looking comfortable  No Sob or CP  -------------------------------------------------------------------------------------------------------------------------------  ROS 12 systems reviewed, pertinent findings above.  ROS    History:  Past Medical History:   Diagnosis Date    Anxiety     Smoker      History reviewed. No pertinent surgical history.  No family history on file.   reports that he has been smoking cigarettes. He does not have any smokeless tobacco history on file.    Objective:   Temp: 97.5 °F (36.4 °C)  Pulse: 86  Resp: 16  BP: 151/97    Intake/Output:     Intake/Output Summary (Last 24 hours) at 3/27/2024 0923  Last data filed at 3/27/2024 0600  Gross per 24 hour   Intake 350 ml   Output 1483 ml   Net -1133 ml       Physical Exam:    General: Awake and alert  HEENT: Normocephalic, anicteric sclera, neck supple.  Neck: No JVD, carotids 2+, no bruits.  Cardiac: Regular rate and rhythm. S1, S2 normal. No murmur, pericardial rub, S3.  Lungs: Clear without wheezes, rales, rhonchi or dullness.  Normal excursions and effort.  Abdomen: Soft, non-tender. BS-present.  Extremities: Without clubbing, cyanosis or edema.  Peripheral pulses are 2+.  Neurologic: Non-focal  Skin: Warm and dry.       Assessment and Plan    Acute type A aortic dissection along with ascending aortic aneurysm  Status post repair 2024  Extubated  Paroxysmal A-fib    Hypertension  Increase metoprolol    Plan:  Amiodarone 200 mg 3 times a day  Aspirin 81 mg daily  Increase Metoprolol        Level of care: L3    Juan Manuel Vance MD  [facility] [place]    3/27/2024  9:23 AM

## 2024-03-27 NOTE — PLAN OF CARE
Pt had few episodes of bradycardia. HR dropped to upper 30s, asymptomatic/non-sustaining. Cards notified. Lees discontinued. Meds given, see eMAR. Hourly nursing rounds made.     Problem: Patient Centered Care  Goal: Patient preferences are identified and integrated in the patient's plan of care  Description: Interventions:  - What would you like us to know as we care for you? From home with spouse Lucie, generally healthy overall  - Provide timely, complete, and accurate information to patient/family  - Incorporate patient and family knowledge, values, beliefs, and cultural backgrounds into the planning and delivery of care  - Encourage patient/family to participate in care and decision-making at the level they choose  - Honor patient and family perspectives and choices  Outcome: Progressing     Problem: Patient/Family Goals  Goal: Patient/Family Long Term Goal  Description: Patient's Long Term Goal: fully recover and return home with wife and family    Interventions:  - discharge planning when appropriate  - safe management during post-op period  - See additional Care Plan goals for specific interventions  Outcome: Progressing  Goal: Patient/Family Short Term Goal  Description: Patient's Short Term Goal: recover in immediate post-op period, safe BP and hemodynamic stability - goals discussed w/ wife at bedside while patient intubated/sedated    Interventions:   - maintain intubation/sedation overnight for POD#0  - continue monitoring via swan/aimee throughout POD#0-1  - See additional Care Plan goals for specific interventions  Outcome: Progressing     Problem: CARDIOVASCULAR - ADULT  Goal: Maintains optimal cardiac output and hemodynamic stability  Description: INTERVENTIONS:  - Monitor vital signs, rhythm, and trends  - Monitor for bleeding, hypotension and signs of decreased cardiac output  - Evaluate effectiveness of vasoactive medications to optimize hemodynamic stability  - Monitor arterial and/or  venous puncture sites for bleeding and/or hematoma  - Assess quality of pulses, skin color and temperature  - Assess for signs of decreased coronary artery perfusion - ex. Angina  - Evaluate fluid balance, assess for edema, trend weights  Outcome: Progressing  Goal: Absence of cardiac arrhythmias or at baseline  Description: INTERVENTIONS:  - Continuous cardiac monitoring, monitor vital signs, obtain 12 lead EKG if indicated  - Evaluate effectiveness of antiarrhythmic and heart rate control medications as ordered  - Initiate emergency measures for life threatening arrhythmias  - Monitor electrolytes and administer replacement therapy as ordered  Outcome: Progressing     Problem: RESPIRATORY - ADULT  Goal: Achieves optimal ventilation and oxygenation  Description: INTERVENTIONS:  - Assess for changes in respiratory status  - Assess for changes in mentation and behavior  - Position to facilitate oxygenation and minimize respiratory effort  - Oxygen supplementation based on oxygen saturation or ABGs  - Provide Smoking Cessation handout, if applicable  - Encourage broncho-pulmonary hygiene including cough, deep breathe, Incentive Spirometry  - Assess the need for suctioning and perform as needed  - Assess and instruct to report SOB or any respiratory difficulty  - Respiratory Therapy support as indicated  - Manage/alleviate anxiety  - Monitor for signs/symptoms of CO2 retention  Outcome: Progressing     Problem: GASTROINTESTINAL - ADULT  Goal: Minimal or absence of nausea and vomiting  Description: INTERVENTIONS:  - Maintain adequate hydration with IV or PO as ordered and tolerated  - Nasogastric tube to low intermittent suction as ordered  - Evaluate effectiveness of ordered antiemetic medications  - Provide nonpharmacologic comfort measures as appropriate  - Advance diet as tolerated, if ordered  - Obtain nutritional consult as needed  - Evaluate fluid balance  Outcome: Progressing  Goal: Maintains or returns to  baseline bowel function  Description: INTERVENTIONS:  - Assess bowel function  - Maintain adequate hydration with IV or PO as ordered and tolerated  - Evaluate effectiveness of GI medications  - Encourage mobilization and activity  - Obtain nutritional consult as needed  - Establish a toileting routine/schedule  - Consider collaborating with pharmacy to review patient's medication profile  Outcome: Progressing     Problem: METABOLIC/FLUID AND ELECTROLYTES - ADULT  Goal: Hemodynamic stability and optimal renal function maintained  Description: INTERVENTIONS:  - Monitor labs and assess for signs and symptoms of volume excess or deficit  - Monitor intake, output and patient weight  - Monitor urine specific gravity, serum osmolarity and serum sodium as indicated or ordered  - Monitor response to interventions for patient's volume status, including labs, urine output, blood pressure (other measures as available)  - Encourage oral intake as appropriate  - Instruct patient on fluid and nutrition restrictions as appropriate  Outcome: Progressing  Goal: Glucose maintained within prescribed range  Description: INTERVENTIONS:  - Monitor Blood Glucose as ordered  - Assess for signs and symptoms of hyperglycemia and hypoglycemia  - Administer ordered medications to maintain glucose within target range  - Assess barriers to adequate nutritional intake and initiate nutrition consult as needed  - Instruct patient on self management of diabetes  Outcome: Progressing  Goal: Electrolytes maintained within normal limits  Description: INTERVENTIONS:  - Monitor labs and rhythm and assess patient for signs and symptoms of electrolyte imbalances  - Administer electrolyte replacement as ordered  - Monitor response to electrolyte replacements, including rhythm and repeat lab results as appropriate  - Fluid restriction as ordered  - Instruct patient on fluid and nutrition restrictions as appropriate  Outcome: Progressing     Problem:  HEMATOLOGIC - ADULT  Goal: Maintains hematologic stability  Description: INTERVENTIONS  - Assess for signs and symptoms of bleeding or hemorrhage  - Monitor labs and vital signs for trends  - Administer supportive blood products/factors, fluids and medications as ordered and appropriate  - Administer supportive blood products/factors as ordered and appropriate  Outcome: Progressing     Problem: SAFETY ADULT - FALL  Goal: Free from fall injury  Description: INTERVENTIONS:  - Assess pt frequently for physical needs  - Identify cognitive and physical deficits and behaviors that affect risk of falls.  - [place] fall precautions as indicated by assessment.  - Educate pt/family on patient safety including physical limitations  - Instruct pt to call for assistance with activity based on assessment  - Modify environment to reduce risk of injury  - Provide assistive devices as appropriate  - Consider OT/PT consult to assist with strengthening/mobility  - Encourage toileting schedule  Outcome: Progressing     Problem: DISCHARGE PLANNING  Goal: Discharge to home or other facility with appropriate resources  Description: INTERVENTIONS:  - Identify barriers to discharge w/pt and caregiver  - Include patient/family/discharge partner in discharge planning  - Arrange for needed discharge resources and transportation as appropriate  - Identify discharge learning needs (meds, wound care, etc)  - Arrange for interpreters to assist at discharge as needed  - Consider post-discharge preferences of patient/family/discharge partner  - Complete POLST form as appropriate  - Assess patient's ability to be responsible for managing their own health  - Refer to Case Management Department for coordinating discharge planning if the patient needs post-hospital services based on physician/LIP order or complex needs related to functional status, cognitive ability or social support system  Outcome: Progressing     Problem: SKIN/TISSUE INTEGRITY -  ADULT  Goal: Incision(s), wounds(s) or drain site(s) healing without S/S of infection  Description: INTERVENTIONS:  - Assess and document risk factors for pressure ulcer development  - Assess and document skin integrity  - Assess and document dressing/incision, wound bed, drain sites and surrounding tissue  - Implement wound care per orders  - Initiate isolation precautions as appropriate  - Initiate Pressure Ulcer prevention bundle as indicated  Outcome: Progressing     Problem: Impaired Swallowing  Goal: Minimize aspiration risk  Description: Interventions:  - Patient should be alert and upright for all feedings (90 degrees preferred)  - Offer food and liquids at a slow rate  - No straws  - Encourage small bites of food and small sips of liquid  - Offer pills one at a time, crush or deliver with applesauce as needed  - Discontinue feeding and notify MD (or speech pathologist) if coughing or persistent throat clearing or wet/gurgly vocal quality is noted  Outcome: Progressing

## 2024-03-27 NOTE — CONSULTS
Physician Medicine & Rehabilitation Consult Note         SUBJECTIVE:    Chief Complaint: To assess rehabilitation needs following Mobility and ADL dysfunction s/p Dissection of thoracoabdominal aorta (HCC) as per request of PCP wants to be admitting physician  .    History of Present Illness: Review of the records show that this  73 year old, male  with PMH significant for anxiety, tobacco use,  who presents to [facility] ED  3/21/24 for evaluation of lower abdominal cramps, frequent bowel movements, and right upper and lower extremity tingling and numbness that started rapidly earlier this morning. Blood pressure upon arrival was 63/37. Sinus bradycardia noted on monitor. In the ED CT abdomen showed extensive aortic dissection, extends from the imaged aortic roots throughout the descending thoracic and abdominal aorta as well as into the right greater than left common iliac arteries. There is a flap occluding subtotally the proximal SMA. CT scan of the chest, abdomen, and pelvis, angiogram, showed massive type A dissection extending from the aortic root to the bilateral common aortoiliac arteries, right more than left. Dissection extends into the aortic arc, great vessels, bilateral carotid artery, celiac axis, and superior mesenteric artery; 90% stenosis, near complete occlusion, involving the right common carotid artery and majority of the superior mesenteric artery; 90% involving left proximal common carotid artery; mild wall thickening of the ascending colon, maybe secondary to early changes of ischemia. The patient was evaluated by Cardiovascular Surgery and he will be taken urgently to the operating room. Patient taken to OR by Dr. Peter Taveras s/p Emergent repair of type a aortic dissection with deep hypothermic circulatory arrest and antegrade cerebral perfusion Replacement of ascending aorta and hemiarch with 32 mm Hemashield platinum graft  Antegrade cerebral perfusion for acute type A aortic  dissection. Patient extubated 3/24/24. Goal SBP <230mmHg. Post-op new onset Afib, receiving amiodarone and is in SR.  As of 3/27 CT remains in place.   SLP dysphagia evaluation 3/25 and reg/thin no straws recommended. Pericardial drain removed 3/27. Patient has had several episodes of HR 30s that are asymptomatic and nonsustaining. Cardiology aware and metoprolol dose dec to 25mg BID. No nutritional risk per dietician. Nabeel d/mina      Current functional status:  3/26  ACTIVITY TOLERANCE  Pulse: 101 (127bpm with activity)  Heart Rate Source: Monitor  Resp: 26  BP: 137/69  BP Location: Left arm  BP Method: Automatic  Patient Position: Sitting     O2 SATURATIONS  Oxygen Therapy  SPO2% on Room Air at Rest: 96  SPO2% Ambulation on Room Air: 100    FUNCTIONAL ABILITY STATUS  Functional Mobility/Gait Assessment  Gait Assistance: Contact guard assist;Minimum assistance  Distance (ft): 50ft x 2  Assistive Device:  (Cardiac cart)  Pattern: Shuffle (decreased juliano speed, flexed posture, decreased step length. Cues for upright posture. Patient veering toward L, cues to ambulate a straight path. CG initially and Min as pt fatigued and with turns.)  Sit to Stand: minimal assist. Cues to widen base of support. Instruction on anterior<>posterior weight shift to gain momentum to stand. Upon standing, cue for upright posture and shoulder relaxation. CG to maintain static standing with UE support on cardiac cart.     FUNCTIONAL TRANSFER ASSESSMENT  Sit to Stand: Chair; Edge of Bed  Edge of Bed: Minimal Assist  Chair: Minimal Assist           BED MOBILITY  Sit to Supine (OT): Minimal Assist     BALANCE ASSESSMENT  Static Standing: Moderate Assist     FUNCTIONAL ADL ASSESSMENT  Eating: Stand-by Assist  Grooming Seated: Minimal Assist  Bathing Seated: Maximum Assist  UB Dressing Seated: Moderate Assist  LB Dressing Seated: Moderate Assist  Toileting Seated: Moderate Assist    Past Medical History:   Diagnosis Date    Anxiety      Smoker        History reviewed. No pertinent surgical history.     Medications:   metoprolol tartrate  50 mg Oral 2x Daily(Beta Blocker)    ferrous sulfate  325 mg Oral BID with meals    [START ON 3/30/2024] amiodarone  200 mg Oral BID with meals    [START ON 4/6/2024] amiodarone  200 mg Oral Daily    clonazePAM  0.5 mg Oral q6h    docusate sodium  100 mg Oral BID    aspirin  81 mg Per G Tube Daily    amiodarone  200 mg Oral TID CC    pantoprazole  40 mg Intravenous Q12H    metoclopramide  10 mg Intravenous Q6H    multivitamin  1 tablet Oral Daily    ascorbic acid  500 mg Oral TID    enoxaparin  40 mg Subcutaneous Daily    sennosides  8.6 mg Oral BID       simethicone, acetaminophen, clonazePAM, melatonin, polyethylene glycol (PEG 3350), sennosides, bisacodyl, fleet enema, ondansetron, potassium chloride **OR** potassium chloride, magnesium sulfate in dextrose 5%, magnesium sulfate in sterile water for injection, acetaminophen **OR** HYDROcodone-acetaminophen **OR** HYDROcodone-acetaminophen, glucose **OR** glucose **OR** glucose-vitamin C **OR** dextrose **OR** glucose **OR** glucose **OR** glucose-vitamin C, [DISCONTINUED] acetaminophen **OR** [DISCONTINUED] acetaminophen **OR** acetaminophen **OR** [DISCONTINUED] acetaminophen    Allergies   Allergen Reactions    Serotonin Reuptake Inhibitors DIARRHEA        No family history on file.     Social History     Socioeconomic History    Marital status:    Tobacco Use    Smoking status: Some Days     Types: Cigarettes   Vaping Use    Vaping Use: Unknown     Social Determinants of Health     Food Insecurity: Unknown (3/22/2024)    Food Insecurity     Food Insecurity: Patient unable to answer   Transportation Needs: Unknown (3/22/2024)    Transportation Needs     Lack of Transportation: Patient unable to answer   Housing Stability: Unknown (3/22/2024)    Housing Stability     Housing Instability: Patient unable to answer       Social History/Living Situation &  Prior Functional Status:  HOME SITUATION  Type of Home: House  Home Layout: One level (1 JOCE)  Lives With: Spouse  Toilet and Equipment: Standard height toilet  Shower/Tub and Equipment: Walk-in shower; Shower chair  Occupation/Status: Retired, driving, active  Hand Dominance: Right  Drives: Yes  Patient Regularly Uses: Glasses     Stairs in Home: 1 level, 1 joce  Use of Assistive Device(s): None     Prior Level of [place]: Pt I with all ADLs and ADLs. Driving, I with community mobility.    Review of systems:   Constitutional systems: Denies fever chills change in weight fatigue  Eyes: Denies vision change  Ears nose mouth and throat: Denies sore throat  Cardiovascular: Denies chest pain, palpitations  Respiratory: Denies SOB, cough hemoptysis  Gastrointestinal: Denies nausea vomiting abdominal pain diarrhea constipation bright red blood per rectum, melena  Genitourinary: Denies dysuria to increased urinary frequency or hematuria  Musculoskeletal: Denies myalgias or arthralgias  Integumentary: Denies rash  Neurological: Denies weakness , denies headache, chronic neuropathy both feet, denies dizziness with standing  Psychiatric: Denies depression or anxiety  Endocrine denies any creased thirst  Hematologic lymphatic: Denies bleeding or easy bruising  Allergic or immunologic denies current allergic symptoms     OBJECTIVE:    Vitals:    03/27/24 0800 03/27/24 0900 03/27/24 1000 03/27/24 1100   BP: 129/60 (!) 152/112 144/63 140/56   BP Location: Right arm Right arm Right arm Right arm   Pulse: 74 74 82 75   Resp: 14 20 22 26   Temp:       TempSrc:       SpO2: 98% 95% 95% 95%   Weight:       Height:         Body mass index is 31.31 kg/m².     Physical Exam:  General: awake, no distress, appears stated age, sitting in recliner, family present  HEENT: normocephalic, normal conjunctiva oral mucosa moist, hearing intact to voice  Cardiovascular: warm, well perfused, RRR  Pulm: breathing comfortably, no respiratory  distress, room air, no increased work of breathing with speaking  GI: abdomen soft, non distended   Skin: median sternotomy with surgical dressing in place  Extremities: LE edema  MSK: bilateral UE, LE strength 5/5 except HF 4/5  Neuro: alert, face grossly symmetrical  Speech: fluent, verbose  Psych: Appropriate affect, cooperative, anxious    Data Review:   Recent Labs   Lab 03/25/24  0401 03/26/24  0501 03/27/24  0345   RBC 3.00* 2.99* 2.87*   HGB 9.7* 9.6* 9.2*   HCT 29.4* 29.2* 27.9*   MCV 98.0 97.7 97.2   MCH 32.3 32.1 32.1   MCHC 33.0 32.9 33.0   RDW 14.4 14.2 13.9   NEPRELIM 12.81* 11.90* 10.63*   WBC 16.0* 15.2* 13.9*   .0* 113.0* 126.0*     Recent Labs   Lab 03/25/24  0401 03/26/24  0501 03/27/24  0345   * 122* 119*   BUN 20 19 18   CREATSERUM 0.72 0.85 0.78   CA 7.8* 8.5* 8.5*   * 148* 147*   K 3.5 3.9 3.4*   * 116* 115*   CO2 20.0* 26.0 26.0     Lab Results   Component Value Date    INR 1.50 (H) 03/21/2024    INR 1.07 03/21/2024         REHAB DIAGNOSES:  Impaired mobility and self-care secondary to Dissection of thoracoabdominal aorta (HCC) [I71.03].    Patient Active Problem List   Diagnosis    Dissection of thoracoabdominal aorta (HCC)         IMPAIRMENTS:      Activities of daily living, functional mobility, balance, cognition, strength, endurance, self care, transfers, hygiene     RECOMMENDATIONS:      For this patient, acute rehabilitation is appropriate when medically cleared  Prior to transfer to acute inpatient rehab, the following to be addressed:  - Off all IV push medications and continuous IV fluids  - Pain and behavior controlled with PO and transdermal meds.  - Pt should be participating with PT/OT and able to tolerate therapy for 3 hours a day.    - Weight bearing or ROM precautions clarified.  - Labs stable, including electrolytes, Hgb (no less than 7.0 and stable), with no signs of infection.  - Encourage OOB to chair as often as possible.    - Encourage patient  to transfer to commode or toilet for toileting   -- Please have PT/OT continue to follow the patient while in house.   - We will continue to follow along with this patient's course during their hospitalization.    This treatment can not be provided at a lower level of care. Requires closer medical supervision than available in other levels of rehab care to manage concomitant medical issues, maintain medical stability and prevent complications, assure adequate pain control, rehabilitation team coordination for an efficient program and optimal outcomeRisks if patient is transferred to a lower level of care include: infection, arrhythmia, uncontrolled HTN, electrolyte abnormalities, infection, anemia, pain, skin breakdown, falls, anxiety/depression  Goal directed therapy with rehabilitation potential will be provided in the following domains. Bed mobility or transfers , Cognitive, speech, language or swallowing retraining, Range of motion and strengthening and Ambulation  There are at least two functional impairments requiring at least minimum assistance. Physical Therapy , Speech Therapy  and Occupational Therapy   This patient should be able to tolerate at least 3 hours per day of skilled therapy, at least 5 days a week, in one or more of the functional domains documented above.        24 hour rehabilitation nursing care also beneficial for medication management, pressure sore prevention, bladder and bowel management. Physiatric medical oversight to monitor anemia, cardiac function, neurologic status, deep vein thrombosis prevention. Estimated length of stay is 10-14 days. Rehabilitation potential is good. Discharge goal is home with family at a supervision to modified independent level with least restrictive assistive device and appropriate adaptive equipment.    Will provide family/caregiver education with appropriate discharge plan of care.     Will follow.    PQRI: Advanced care plan documented    Thank you for  involving us with this patient's care.  Neal Roland MD, 03/27/24, 6:04 PM'

## 2024-03-27 NOTE — PROGRESS NOTES
[facility]  part of [facility]     Progress Note    Talib Gonzalez Patient Status:  Inpatient    1950 MRN [id number]   Location [facility] 2W/SW Attending Keya Workman,    Hosp Day # 6 PCP No primary care provider on file.       Subjective:   Seen and examined.  Sitting in chair.  Pain well-controlled.  Denies significant dyspnea    Objective:   Blood pressure 140/56, pulse 75, temperature 97.5 °F (36.4 °C), temperature source Temporal, resp. rate 26, height 6' (1.829 m), weight 230 lb 13.2 oz (104.7 kg), SpO2 95%.  Intake/Output:   Last 3 shifts: I/O last 3 completed shifts:  In: 350 [P.O.:350]  Out:  [Urine:; Chest Tube:160]   This shift: I/O this shift:  In: 200 [P.O.:200]  Out: 250 [Urine:250]     Vent Settings:      Hemodynamic parameters (last 24 hours):      Scheduled Meds:         Continuous Infusions:    dextrose 50 mL/hr at 24 1247       Physical Exam  Constitutional: no acute distress  Eyes: PERRL  ENT: nares pateint  Neck: supple, no JVD  Cardio: RRR, S1 S2  Respiratory: Basilar crackles  GI: abdomen soft, non tender, active bowel sounds, no organomegaly  Extremities: no clubbing, cyanosis, edema  Neurologic: no gross motor deficits  Skin: warm, dry      Results:     Lab Results   Component Value Date    WBC 13.9 2024    HGB 9.2 2024    HCT 27.9 2024    .0 2024    CREATSERUM 0.78 2024    BUN 18 2024     2024    K 3.4 2024     2024    CO2 26.0 2024     2024    CA 8.5 2024       No results found.          Assessment   1.  Type A aortic dissection POD #5 status post repair  2.  Acute respiratory failure  3.  Carotid stenosis  4.  Anemia  5.  Thrombocytopenia     Plan   -Patient presents with evidence of sudden onset abdominal discomfort with some generalized malaise earlier this morning.  No significant chest pain or back pain per patient.  -CT abdomen  pelvis and subsequent CT chest abdomen pelvis with evidence of extensive type B dissection extending from aortic root to bilateral common iliac arteries.  Mild wall thickening of the ascending colon secondary to changes likely secondary to early ischemia.  -Status post emergent repair of type A dissection on 3/21/2024.  -Tolerating extubation on 3/24/2024  -Likely chest tube removal today  -Up in chair.  Incentive spirometry.  Activity as tolerated  -DVT prophylaxis: SCDs, Lovenox  -Discussed with family    Ruddy Rubi, DO  Pulmonary Critical Care Medicine  [facility]

## 2024-03-27 NOTE — PHYSICAL THERAPY NOTE
PHYSICAL THERAPY TREATMENT NOTE - INPATIENT     Room Number: 234/234-A       Presenting Problem: aortic dissection status postsurgical repair on 3/22/24  Co-Morbidities : Anxiety    Problem List  Principal Problem:    Dissection of thoracoabdominal aorta (HCC)      PHYSICAL THERAPY ASSESSMENT   Patient demonstrates excellent progress this session, goals  remain in progress.    Patient continues to function below baseline with bed mobility, transfers, gait, stair negotiation, maintaining seated position, standing prolonged periods, and performing household tasks.  Contributing factors to remaining limitations include decreased endurance/aerobic capacity, impaired dynamic standing balance, decreased muscular endurance, and medical status.  Next session anticipate patient to progress bed mobility, transfers, gait, stair negotiation, maintaining seated position, standing prolonged periods, and performing household tasks.  Physical Therapy will continue to follow patient for duration of hospitalization.    Patient continues to benefit from continued skilled PT services: at discharge to promote functional independence and safety with additional support and return home with home health PT.    PLAN  PT Treatment Plan: Bed mobility;Body mechanics;Energy conservation;Endurance;Patient education;Family education;Gait training;Stoop training;Stair training;Strengthening;Transfer training;Balance training;Range of motion  Frequency (Obs): Daily    SUBJECTIVE  \"Every day is better\"     OBJECTIVE  Precautions: Sternal;Chest tube       PAIN ASSESSMENT   Ratin  Location: incisional  Management Techniques: Activity promotion;Body mechanics;Repositioning    BALANCE  Static Sitting: Good  Dynamic Sitting: Good  Static Standing: Fair +  Dynamic Standing: Fair    ACTIVITY TOLERANCE  Pulse: 85  Heart Rate Source: Monitor      O2 WALK  Oxygen Therapy  SPO2% Ambulation on Room Air: 96    AM-PAC '6-Clicks' INPATIENT SHORT FORM - BASIC  MOBILITY  How much difficulty does the patient currently have...  Patient Difficulty: Turning over in bed (including adjusting bedclothes, sheets and blankets)?: A Little   Patient Difficulty: Sitting down on and standing up from a chair with arms (e.g., wheelchair, bedside commode, etc.): A Little   Patient Difficulty: Moving from lying on back to sitting on the side of the bed?: A Little   How much help from another person does the patient currently need...   Help from Another: Moving to and from a bed to a chair (including a wheelchair)?: A Little   Help from Another: Need to walk in hospital room?: A Little   Help from Another: Climbing 3-5 steps with a railing?: A Little     AM-PAC Score:  Raw Score: 18   Approx Degree of Impairment: 46.58%   Standardized Score (AM-PAC Scale): 43.63   CMS Modifier (G-Code): CK    FUNCTIONAL ABILITY STATUS  Functional Mobility/Gait Assessment  Gait Assistance: Contact guard assist (SBA)  Distance (ft): 150ft  Assistive Device: Rolling walker  Pattern: Shuffle (decreased juliano speed, decreased step length, cues for upright posture, cues for safe management of RW with turns and ambulating closer to walker with turns)  Sit to Stand: stand-by assist. Cues for weight shifting to scoot toward edge of chair. Patient holding cardiac pillow to maintain compliance toward post operative sternal precautions. Cues for eccentric lowering from stand>sit.     Additional information: Patient received ambulating in hallway with RN. Therapist educated pt on POC and physiological benefits of mobilization. Reviewed post operative sternal precautions- pt with good carryover. Cues to promote pacing and energy conservation techniques that can be applied to home environment. Patient denies pain.     The patient's Approx Degree of Impairment: 46.58% has been calculated based on documentation in the [facility] '6 clicks' Inpatient Daily Activity Short Form.  Research supports that patients with this level of  impairment may benefit from home with home PT.  Final disposition will be made by interdisciplinary medical team.    Patient End of Session: Up in chair;Needs met;Call light within reach;RN aware of session/findings;Alarm set;Family present    CURRENT GOALS   Goals to be met by: 4/8/24  Patient Goal Patient's self-stated goal is: return to PLOF   Goal #1 Patient is able to demonstrate supine - sit EOB @ level: supervision      Goal #1   Current Status  NT   Goal #2 Patient is able to demonstrate transfers Sit to/from Stand at assistance level: supervision with walker - rolling      Goal #2  Current Status  SBA with RW   Goal #3 Patient is able to ambulate 150 feet with assist device: walker - rolling at assistance level: supervision   Goal #3   Current Status  SBA for straight path, CG for turns. 150ft with RW   Goal #4 Patient will negotiate curb w/ assistive device and supervision   Goal #4   Current Status  NT   Goal #5 Patient to demonstrate independence with home activity/exercise instructions provided to patient in preparation for discharge.   Goal #5   Current Status  ongoing   Goal #6 Patient will demonstrate compliance toward post operative sternal precautions 100% of time to optimize healing- comprehension will be assessed with pt verbalizing 8 out of 8 precautions.    Goal #6  Current Status  compliant throughout session.      Gait Training: 10 minutes  Therapeutic Activity: 15 minutes

## 2024-03-27 NOTE — PROGRESS NOTES
[facility]  part of [facility]    Progress Note    Talib Gonzalez Patient Status:  Inpatient    1950 MRN [id number]   Location [facility] 2W/SW Attending Cesar Cortez,*   Hosp Day # 6 PCP No primary care provider on file.     Chief Complaint: hurts when cough    Subjective:     Constitutional:  Positive for fatigue. Negative for appetite change.   HENT:  Negative for congestion.    Respiratory:  Positive for cough. Negative for shortness of breath.    Cardiovascular:  Positive for chest pain.   Gastrointestinal:  Negative for heartburn, nausea, abdominal pain and diarrhea.   Genitourinary:  Negative for dysuria.   Musculoskeletal:  Negative for joint swelling and joint pain.   Neurological:  Positive for weakness.   Psychiatric/Behavioral:  Positive for sleep disturbance. The patient is nervous/anxious.        Objective:   Blood pressure 140/56, pulse 75, temperature 97.5 °F (36.4 °C), temperature source Temporal, resp. rate 26, height 6' (1.829 m), weight 230 lb 13.2 oz (104.7 kg), SpO2 95%.  Physical Exam  Vitals and nursing note reviewed.   Constitutional:       Appearance: He is obese. He is ill-appearing.   HENT:      Head: Normocephalic and atraumatic.   Cardiovascular:      Rate and Rhythm: Normal rate and regular rhythm.      Pulses: Normal pulses.      Heart sounds: Murmur heard.   Pulmonary:      Breath sounds: Rhonchi present. No wheezing.   Abdominal:      General: Bowel sounds are normal.      Palpations: Abdomen is soft.   Skin:     General: Skin is warm and dry.      Capillary Refill: Capillary refill takes less than 2 seconds.   Neurological:      General: No focal deficit present.      Mental Status: He is alert and oriented to person, place, and time.   Psychiatric:         Behavior: Behavior normal.         Judgment: Judgment normal.         Results:   Lab Results   Component Value Date    WBC 13.9 (H) 2024    HGB 9.2 (L) 2024    HCT  27.9 (L) 03/27/2024    .0 (L) 03/27/2024    CREATSERUM 0.78 03/27/2024    BUN 18 03/27/2024     (H) 03/27/2024    K 3.4 (L) 03/27/2024     (H) 03/27/2024    CO2 26.0 03/27/2024     (H) 03/27/2024    CA 8.5 (L) 03/27/2024    ALB 3.7 03/30/2023    ALKPHO 74 03/30/2023    BILT 0.5 03/30/2023    TP 7.4 03/30/2023    AST 21 03/30/2023    ALT 26 03/30/2023    PTT 37.3 (H) 03/21/2024    INR 1.50 (H) 03/21/2024    TSH 0.643 03/24/2024    PSA 4.0 12/05/2017    DDIMER >20.00 (H) 03/21/2024    MG 1.9 03/24/2024       No results found.        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 Cards on consult. Tele monitor.      Acute blood loss anemia - expected. Cont to monitor , cbc daily      Thrombocytopenia - monitor cbc daily      Hyperglycemia - cont iss      Hypernatremia - cont to push water      DVT ppx lovenox      PT/OT      Hyperchlorremia obs    Complex mdm; coordinating care with nurse and counseling pt and with his permission his daughters in room about inc tashi/seriousness of condition          JUAN DANIEL PARMAR MD

## 2024-03-27 NOTE — CM/SW NOTE
PMR has made recommendations for acute rehab.  PT was seen this afternoon and now has progressed to 150 feet CGA so he may no longer qualify for acute.Therapy is now recommending home with Home PT.    Covering SW for 3/28 will reach out during rounding to confirm final dc plan.    / to remain available for support and/or discharge planning.      Marylou Zuniga MBA BSN RN CRRN   RN Case Manager  [phone]

## 2024-03-27 NOTE — DIETARY NOTE
BRIEF DIETITIAN NOTE     Pt re-screened for LOS (length of stay). Found to be at no nutrition risk at this time. No meals intakes have been recorded since pt's admission. Per RN report today pt is eating well. Weight relatively stable. Please consult RD if further nutrition intervention is needed.     Percent Meals Eaten (last 6 days)       None             Patient Weight(s) for the past 336 hrs:   Weight   03/27/24 0600 104.7 kg (230 lb 13.2 oz)   03/26/24 0615 93 kg (205 lb 0.4 oz)   03/25/24 0600 108.8 kg (239 lb 13.8 oz)   03/24/24 0600 108.5 kg (239 lb 3.2 oz)   03/23/24 0700 109.1 kg (240 lb 8.4 oz)   03/22/24 0208 98.4 kg (216 lb 14.9 oz)   03/21/24 1248 98.4 kg (217 lb)   03/21/24 1231 98.4 kg (217 lb)        Wt Readings from Last 6 Encounters:   03/27/24 104.7 kg (230 lb 13.2 oz)        F/u per protocol or as appropriate.       Ching Soria RD, LDN  Clinical Dietitian  P: [phone]

## 2024-03-27 NOTE — PROGRESS NOTES
[facility]  part of [facility]    Progress Note    Talib Gonzalez Patient Status:  Inpatient    1950 MRN [id number]   Location [facility] 2W/SW Attending Keya Workman,    Hosp Day # 6 PCP No primary care provider on file.     Subjective:  OOB in chair on exam, wife/daughters at bedside.  No acute events noted overnight.  He currently denies: CP, SOB, palpitations, or dizziness.  Still feels a little weak when trying to get up out of bed or chair, otherwise feeling better every day.    Objective:  /63 (BP Location: Right arm)   Pulse 82   Temp 97.5 °F (36.4 °C) (Temporal)   Resp 22   Ht 6' (1.829 m)   Wt 230 lb 13.2 oz (104.7 kg)   SpO2 95%   BMI 31.31 kg/m²     Temp (24hrs), Av.4 °F (36.9 °C), Min:97.5 °F (36.4 °C), Max:99.7 °F (37.6 °C)  Telemetry-NSR    Intake/Output:    Intake/Output Summary (Last 24 hours) at 3/27/2024 1132  Last data filed at 3/27/2024 0800  Gross per 24 hour   Intake 550 ml   Output 1553 ml   Net -1003 ml       Wt Readings from Last 3 Encounters:   24 230 lb 13.2 oz (104.7 kg)       Allergies:  Allergies   Allergen Reactions    Serotonin Reuptake Inhibitors DIARRHEA       Labs:  Lab Results   Component Value Date    WBC 13.9 2024    HGB 9.2 2024    HCT 27.9 2024    .0 2024    CREATSERUM 0.78 2024    BUN 18 2024     2024    K 3.4 2024     2024    CO2 26.0 2024     2024    CA 8.5 2024       Physical Exam:  Blood pressure 144/63, pulse 82, temperature 97.5 °F (36.4 °C), temperature source Temporal, resp. rate 22, height 6' (1.829 m), weight 230 lb 13.2 oz (104.7 kg), SpO2 95%.  General: NAD  Neck: No JVD  Lungs: Diminished bases bilateral, otherwise clear  Milad drain=20cc/12 hours  Pleural chest tubes=10cc/12 hours- no air leak   Heart: RRR, S1, S2  Abdomen: Soft/Round, NT/ND, BS+x 4/+Flatus/+BM   Extremities: Warm, dry, trace LE  edema bilat  Pulses: 1+ bilat DP  Skin: sternotomy drsg: CDI, Right femoral incision drsg: CDI   Neurological:  AAOx3, MAEW    Assessment/Plan:  S/P EMERGENT TYPE A AORTIC DISSECTION REPAIR POD # 6  Encourage pulm toilet: IS -wean off O2 as tolerated-currently on 2 L NC.  PA/LAT's chest x-ray in a.m. once pericardial drain removed today  + smoker: continued to encourage smoking cessation/explained importance- pt agrees to quit and is ready  Pain meds as needed -wean off narcs as tolerated  Increase activity-oob to chair and ambulate- working with PT/OT, awaiting PMR consult  Scds/Lovenox prophylaxis DVT prevention   Continues hemodynamically stable-DC pericardial drain today, patient may shower  No hx: DM -transitioned off insulin/Accu-Cheks discontinued yesterday  Swallow eval prior to oral intake due to prolonged intubation- follow recs  Expected post op volume overload: Lasix today, and prn. Suspect wgt discrepancies post op due to various scales used.Pt states pre op home aoc=064 lbs.   Expected post op anemia w/o clinical significance/stable, start iron-continue for 1 month postop  Hx: Anxiety- resumed home med regimen as pt described.   Intermittent post op AF- on Amio-currently SR    Discharge planning: pt lives w/his wife. Continue to eval as pt progresses. Goal is home.  PT/OT recommending acute rehab, await PMR consult.    Plan of care discussed w/patient/wife/RN and CV Surgeon: Dr. Darcy De Leon, APRN  3/27/2024  1140

## 2024-03-27 NOTE — PROGRESS NOTES
Notified by primary RN-Nabila, reported pt with episodes of bradycardia with rate as low as 37-asynptomatic. RN stated similar episodes occurred during the day. Metoprolol dose decreased to 25 mg BID.

## 2024-03-28 ENCOUNTER — APPOINTMENT (OUTPATIENT)
Dept: GENERAL RADIOLOGY | Facility: HOSPITAL | Age: 74
End: 2024-03-28
Attending: NURSE PRACTITIONER
Payer: MEDICARE

## 2024-03-28 ENCOUNTER — APPOINTMENT (OUTPATIENT)
Dept: GENERAL RADIOLOGY | Facility: HOSPITAL | Age: 74
DRG: 219 | End: 2024-03-28
Attending: NURSE PRACTITIONER
Payer: MEDICARE

## 2024-03-28 VITALS
SYSTOLIC BLOOD PRESSURE: 132 MMHG | WEIGHT: 234.81 LBS | BODY MASS INDEX: 31.8 KG/M2 | DIASTOLIC BLOOD PRESSURE: 76 MMHG | HEIGHT: 72 IN | TEMPERATURE: 98 F | HEART RATE: 78 BPM | RESPIRATION RATE: 20 BRPM | OXYGEN SATURATION: 95 %

## 2024-03-28 LAB
ANION GAP SERPL CALC-SCNC: 8 MMOL/L (ref 0–18)
BASOPHILS # BLD AUTO: 0.03 X10(3) UL (ref 0–0.2)
BASOPHILS NFR BLD AUTO: 0.3 %
BUN BLD-MCNC: 15 MG/DL (ref 9–23)
BUN/CREAT SERPL: 20.3 (ref 10–20)
CALCIUM BLD-MCNC: 8.8 MG/DL (ref 8.7–10.4)
CHLORIDE SERPL-SCNC: 112 MMOL/L (ref 98–112)
CO2 SERPL-SCNC: 23 MMOL/L (ref 21–32)
CREAT BLD-MCNC: 0.74 MG/DL
DEPRECATED RDW RBC AUTO: 47.2 FL (ref 35.1–46.3)
EGFRCR SERPLBLD CKD-EPI 2021: 96 ML/MIN/1.73M2 (ref 60–?)
EOSINOPHIL # BLD AUTO: 0.35 X10(3) UL (ref 0–0.7)
EOSINOPHIL NFR BLD AUTO: 3.1 %
ERYTHROCYTE [DISTWIDTH] IN BLOOD BY AUTOMATED COUNT: 13.9 % (ref 11–15)
GLUCOSE BLD-MCNC: 124 MG/DL (ref 70–99)
HCT VFR BLD AUTO: 27.1 %
HGB BLD-MCNC: 9.2 G/DL
IMM GRANULOCYTES # BLD AUTO: 0.09 X10(3) UL (ref 0–1)
IMM GRANULOCYTES NFR BLD: 0.8 %
LD FRACTION 1: 23 %
LD FRACTION 2: 34 %
LD FRACTION 3: 22 %
LD FRACTION 4: 8 %
LD FRACTION 5: 13 %
LDH: 257 IU/L
LYMPHOCYTES # BLD AUTO: 1.34 X10(3) UL (ref 1–4)
LYMPHOCYTES NFR BLD AUTO: 11.8 %
MAGNESIUM SERPL-MCNC: 1.6 MG/DL (ref 1.6–2.6)
MCH RBC QN AUTO: 31.9 PG (ref 26–34)
MCHC RBC AUTO-ENTMCNC: 33.9 G/DL (ref 31–37)
MCV RBC AUTO: 94.1 FL
MONOCYTES # BLD AUTO: 1.1 X10(3) UL (ref 0.1–1)
MONOCYTES NFR BLD AUTO: 9.7 %
NEUTROPHILS # BLD AUTO: 8.4 X10 (3) UL (ref 1.5–7.7)
NEUTROPHILS # BLD AUTO: 8.4 X10(3) UL (ref 1.5–7.7)
NEUTROPHILS NFR BLD AUTO: 74.3 %
OSMOLALITY SERPL CALC.SUM OF ELEC: 298 MOSM/KG (ref 275–295)
PHOSPHATE SERPL-MCNC: 2.4 MG/DL (ref 2.4–5.1)
PLATELET # BLD AUTO: 144 10(3)UL (ref 150–450)
POTASSIUM SERPL-SCNC: 3.7 MMOL/L (ref 3.5–5.1)
POTASSIUM SERPL-SCNC: 3.8 MMOL/L (ref 3.5–5.1)
RBC # BLD AUTO: 2.88 X10(6)UL
SODIUM SERPL-SCNC: 143 MMOL/L (ref 136–145)
WBC # BLD AUTO: 11.3 X10(3) UL (ref 4–11)

## 2024-03-28 PROCEDURE — 99232 SBSQ HOSP IP/OBS MODERATE 35: CPT | Performed by: INTERNAL MEDICINE

## 2024-03-28 PROCEDURE — 71046 X-RAY EXAM CHEST 2 VIEWS: CPT | Performed by: NURSE PRACTITIONER

## 2024-03-28 PROCEDURE — 99233 SBSQ HOSP IP/OBS HIGH 50: CPT | Performed by: HOSPITALIST

## 2024-03-28 RX ORDER — METOPROLOL TARTRATE 50 MG/1
50 TABLET, FILM COATED ORAL
Qty: 60 TABLET | Refills: 3 | Status: SHIPPED | OUTPATIENT
Start: 2024-03-28

## 2024-03-28 RX ORDER — ASPIRIN 81 MG/1
81 TABLET, CHEWABLE ORAL DAILY
Qty: 90 TABLET | Refills: 0 | Status: SHIPPED | OUTPATIENT
Start: 2024-03-29

## 2024-03-28 RX ORDER — ASCORBIC ACID 500 MG
500 TABLET ORAL 3 TIMES DAILY
Qty: 90 TABLET | Refills: 0 | Status: SHIPPED | OUTPATIENT
Start: 2024-03-28

## 2024-03-28 RX ORDER — MAGNESIUM SULFATE HEPTAHYDRATE 40 MG/ML
2 INJECTION, SOLUTION INTRAVENOUS ONCE
Status: DISCONTINUED | OUTPATIENT
Start: 2024-03-28 | End: 2024-03-28

## 2024-03-28 RX ORDER — PANTOPRAZOLE SODIUM 40 MG/1
40 TABLET, DELAYED RELEASE ORAL
Status: DISCONTINUED | OUTPATIENT
Start: 2024-03-29 | End: 2024-03-28

## 2024-03-28 RX ORDER — FERROUS SULFATE 325(65) MG
325 TABLET, DELAYED RELEASE (ENTERIC COATED) ORAL
Qty: 30 TABLET | Refills: 0 | Status: SHIPPED | OUTPATIENT
Start: 2024-03-29

## 2024-03-28 RX ORDER — MAGNESIUM OXIDE 400 MG/1
800 TABLET ORAL ONCE
Status: COMPLETED | OUTPATIENT
Start: 2024-03-28 | End: 2024-03-28

## 2024-03-28 RX ORDER — HYDROCODONE BITARTRATE AND ACETAMINOPHEN 5; 325 MG/1; MG/1
1 TABLET ORAL EVERY 6 HOURS PRN
Qty: 30 TABLET | Refills: 0 | Status: SHIPPED | OUTPATIENT
Start: 2024-03-28

## 2024-03-28 RX ORDER — FERROUS SULFATE 325(65) MG
325 TABLET, DELAYED RELEASE (ENTERIC COATED) ORAL
Status: DISCONTINUED | OUTPATIENT
Start: 2024-03-29 | End: 2024-03-28

## 2024-03-28 RX ORDER — AMIODARONE HYDROCHLORIDE 200 MG/1
TABLET ORAL
Qty: 104 TABLET | Refills: 0 | Status: SHIPPED | OUTPATIENT
Start: 2024-03-29 | End: 2024-07-04

## 2024-03-28 RX ORDER — PANTOPRAZOLE SODIUM 40 MG/1
40 TABLET, DELAYED RELEASE ORAL
Qty: 30 TABLET | Refills: 0 | Status: SHIPPED | OUTPATIENT
Start: 2024-03-29

## 2024-03-28 RX ORDER — DOXEPIN HYDROCHLORIDE 50 MG/1
1 CAPSULE ORAL DAILY
Qty: 30 TABLET | Refills: 0 | Status: SHIPPED | OUTPATIENT
Start: 2024-03-29

## 2024-03-28 NOTE — CARDIAC REHAB
Cardiac Rehab Phase I    Activity:   Chair: Yes   Ambulation: Yes   Assistive Device: Walker   Distance: 200 feet   Assistance needed: No   Patient tolerated activity: Well.   Shower Date: 3/28 Tolerated Shower Activity Well.    Education:  Handouts provided and reviewed: Heart Surgery Binder. :Referred to Cardiac Rehabilitation Phase 2.  Patient instructed on: I.S. / Acapella, Cough and Deep Breathe, Incision Care, Infection Prevention, and Pain Scale Instruction.     Diet: Healthy Cardiac diet reviewed.    Disease Process: Disease process reviewed.    Weight Monitoring: Instructed on daily weights.    I.S. and/or Acapella: Patient instructed on incentive spirometer and/or acapella with good return demonstration.    Infection: Reviewed signs and symptoms of infection. Infection prevention and when to notify MD.

## 2024-03-28 NOTE — PROGRESS NOTES
[facility]  part of [facility]     Progress Note    Talib Gonzalez Patient Status:  Inpatient    1950 MRN [id number]   Location [facility] 2W/SW Attending Keya Workman DO   Hosp Day # 7 PCP Jamila Saul DO       Subjective:   Seen and examined.  Sitting in chair.  Pain well-controlled.  Denies significant dyspnea    Objective:   Blood pressure 132/76, pulse 84, temperature 97.7 °F (36.5 °C), temperature source Temporal, resp. rate 20, height 6' (1.829 m), weight 234 lb 12.6 oz (106.5 kg), SpO2 95%.  Intake/Output:   Last 3 shifts: I/O last 3 completed shifts:  In: 1581.6 [P.O.:775; I.V.:806.6]  Out: 935 [Urine:925; Chest Tube:10]   This shift: I/O this shift:  In: 350 [P.O.:350]  Out: -      Vent Settings:      Hemodynamic parameters (last 24 hours):      Scheduled Meds:         Continuous Infusions:    dextrose 50 mL/hr at 24 1247       Physical Exam  Constitutional: no acute distress  Eyes: PERRL  ENT: nares pateint  Neck: supple, no JVD  Cardio: RRR, S1 S2  Respiratory: Basilar crackles  GI: abdomen soft, non tender, active bowel sounds, no organomegaly  Extremities: no clubbing, cyanosis, edema  Neurologic: no gross motor deficits  Skin: warm, dry      Results:     Lab Results   Component Value Date    WBC 11.3 2024    HGB 9.2 2024    HCT 27.1 2024    .0 2024    CREATSERUM 0.74 2024    BUN 15 2024     2024    K 3.7 2024     2024    CO2 23.0 2024     2024    CA 8.8 2024    MG 1.6 2024    PHOS 2.4 2024       XR CHEST PA + LAT CHEST (CPT=71046)    Result Date: 3/28/2024  CONCLUSION:  1. Normal heart size and pulmonary vascularity.  Status post sternotomy.  Small pleural effusions posteriorly.  All tubes have been removed.  No pneumothorax.  No large airspace consolidation    Dictated by (CST): Rodolfo Babin MD on 3/28/2024 at 11:19 AM     Finalized by  (CST): Rodolfo Babin MD on 3/28/2024 at 11:23 AM                 Assessment   1.  Type A aortic dissection POD #5 status post repair  2.  Acute respiratory failure  3.  Carotid stenosis  4.  Anemia  5.  Thrombocytopenia     Plan   -Patient presents with evidence of sudden onset abdominal discomfort with some generalized malaise earlier this morning.  No significant chest pain or back pain per patient.  -CT abdomen pelvis and subsequent CT chest abdomen pelvis with evidence of extensive type B dissection extending from aortic root to bilateral common iliac arteries.  Mild wall thickening of the ascending colon secondary to changes likely secondary to early ischemia.  -Status post emergent repair of type A dissection on 3/21/2024.  -Tolerating extubation on 3/24/2024  -Up in chair.  Incentive spirometry.  Activity as tolerated  -DVT prophylaxis: SCDs, Lovenox  -Plan for DC today    Ruddy Rubi, DO  Pulmonary Critical Care Medicine  [facility]

## 2024-03-28 NOTE — PROGRESS NOTES
Cardiology Progress Note    Talib Gonzalez Patient Status:  Inpatient    1950 MRN [id number]   Location [facility] 2W/SW Attending Cesar Cortez,*   Hosp Day # 7 PCP No primary care provider on file.     Interval Note:  Patient walking around in the room  No shortness of breath or chest pain  Looking comfortable    --------------------------------------------------------------------------------------------------------------------------------  ROS 12 systems reviewed, pertinent findings above.  ROS    History:  Past Medical History:   Diagnosis Date    Anxiety     Smoker      History reviewed. No pertinent surgical history.  No family history on file.   reports that he has been smoking cigarettes. He does not have any smokeless tobacco history on file.  Labs:  Sodium/potassium 143/3.7  BUN/creatinine 15/0.74  WBC 11.3 H/H 9.2/27.1  Objective:   Temp: 99.7 °F (37.6 °C)  Pulse: 79  Resp: 29  BP: 151/64    Intake/Output:     Intake/Output Summary (Last 24 hours) at 3/28/2024 0857  Last data filed at 3/28/2024 0600  Gross per 24 hour   Intake 1031.63 ml   Output 250 ml   Net 781.63 ml       Physical Exam:    General: Awake and alert  In no acute distress  HEENT: Normocephalic, anicteric sclera, neck supple.  Neck: No JVD, carotids 2+, no bruits.  Cardiac: Regular rate and rhythm. S1, S2 normal. No murmur, pericardial rub, S3.  Lungs: Clear without wheezes, rales, rhonchi or dullness.  Surgical wound healing cleanly  Abdomen: Soft, non-tender. BS-present.  Extremities: Without clubbing, cyanosis or edema.  Peripheral pulses are 2+.  Neurologic: Non-focal  Skin: Warm and dry.       Assessment and Plan    Acute type A aortic dissection along with ascending aortic aneurysm  Status post repair 2024  Extubated  Paroxysmal A-fib     Hypertension  Under control     Plan:  Amiodarone 200 mg 2 times a day  Aspirin 81 mg daily  Metoprolol   Enoxaparin 40 mg sq daily        Level of care:  L3    Juan Manuel Vance MD  [facility]      3/28/2024  8:57 AM

## 2024-03-28 NOTE — DISCHARGE SUMMARY
Dc summary#[id number]  > 30 min spent on dc    Hospital Discharge Diagnoses: type a aoritic dissection and repair    Lace+ Score: 49  59-90 High Risk  29-58 Medium Risk  0-28   Low Risk.    TCM Follow-Up Recommendation:  LACE > 58: High Risk of readmission after discharge from the hospital.tcm fu recommended

## 2024-03-28 NOTE — PLAN OF CARE
Pt did well overnight. Ambulated well from bed to chair and to bathroom this morning. X2 bowel movements noted. Urinating well. Plan to shower today and possible discharge.    Problem: Patient Centered Care  Goal: Patient preferences are identified and integrated in the patient's plan of care  Description: Interventions:  - What would you like us to know as we care for you? From home with spouse Lucie, generally healthy overall  - Provide timely, complete, and accurate information to patient/family  - Incorporate patient and family knowledge, values, beliefs, and cultural backgrounds into the planning and delivery of care  - Encourage patient/family to participate in care and decision-making at the level they choose  - Honor patient and family perspectives and choices  Outcome: Progressing     Problem: Patient/Family Goals  Goal: Patient/Family Long Term Goal  Description: Patient's Long Term Goal: fully recover and return home with wife and family    Interventions:  - discharge planning when appropriate  - safe management during post-op period  - See additional Care Plan goals for specific interventions  Outcome: Progressing  Goal: Patient/Family Short Term Goal  Description: Patient's Short Term Goal: recover in immediate post-op period, safe BP and hemodynamic stability - goals discussed w/ wife at bedside while patient intubated/sedated    Interventions:   - maintain intubation/sedation overnight for POD#0  - continue monitoring via swan/aimee throughout POD#0-1  - See additional Care Plan goals for specific interventions  Outcome: Progressing     Problem: CARDIOVASCULAR - ADULT  Goal: Maintains optimal cardiac output and hemodynamic stability  Description: INTERVENTIONS:  - Monitor vital signs, rhythm, and trends  - Monitor for bleeding, hypotension and signs of decreased cardiac output  - Evaluate effectiveness of vasoactive medications to optimize hemodynamic stability  - Monitor arterial and/or venous  puncture sites for bleeding and/or hematoma  - Assess quality of pulses, skin color and temperature  - Assess for signs of decreased coronary artery perfusion - ex. Angina  - Evaluate fluid balance, assess for edema, trend weights  Outcome: Progressing  Goal: Absence of cardiac arrhythmias or at baseline  Description: INTERVENTIONS:  - Continuous cardiac monitoring, monitor vital signs, obtain 12 lead EKG if indicated  - Evaluate effectiveness of antiarrhythmic and heart rate control medications as ordered  - Initiate emergency measures for life threatening arrhythmias  - Monitor electrolytes and administer replacement therapy as ordered  Outcome: Progressing     Problem: RESPIRATORY - ADULT  Goal: Achieves optimal ventilation and oxygenation  Description: INTERVENTIONS:  - Assess for changes in respiratory status  - Assess for changes in mentation and behavior  - Position to facilitate oxygenation and minimize respiratory effort  - Oxygen supplementation based on oxygen saturation or ABGs  - Provide Smoking Cessation handout, if applicable  - Encourage broncho-pulmonary hygiene including cough, deep breathe, Incentive Spirometry  - Assess the need for suctioning and perform as needed  - Assess and instruct to report SOB or any respiratory difficulty  - Respiratory Therapy support as indicated  - Manage/alleviate anxiety  - Monitor for signs/symptoms of CO2 retention  Outcome: Progressing     Problem: GASTROINTESTINAL - ADULT  Goal: Minimal or absence of nausea and vomiting  Description: INTERVENTIONS:  - Maintain adequate hydration with IV or PO as ordered and tolerated  - Nasogastric tube to low intermittent suction as ordered  - Evaluate effectiveness of ordered antiemetic medications  - Provide nonpharmacologic comfort measures as appropriate  - Advance diet as tolerated, if ordered  - Obtain nutritional consult as needed  - Evaluate fluid balance  Outcome: Progressing  Goal: Maintains or returns to baseline  bowel function  Description: INTERVENTIONS:  - Assess bowel function  - Maintain adequate hydration with IV or PO as ordered and tolerated  - Evaluate effectiveness of GI medications  - Encourage mobilization and activity  - Obtain nutritional consult as needed  - Establish a toileting routine/schedule  - Consider collaborating with pharmacy to review patient's medication profile  Outcome: Progressing     Problem: METABOLIC/FLUID AND ELECTROLYTES - ADULT  Goal: Hemodynamic stability and optimal renal function maintained  Description: INTERVENTIONS:  - Monitor labs and assess for signs and symptoms of volume excess or deficit  - Monitor intake, output and patient weight  - Monitor urine specific gravity, serum osmolarity and serum sodium as indicated or ordered  - Monitor response to interventions for patient's volume status, including labs, urine output, blood pressure (other measures as available)  - Encourage oral intake as appropriate  - Instruct patient on fluid and nutrition restrictions as appropriate  Outcome: Progressing  Goal: Glucose maintained within prescribed range  Description: INTERVENTIONS:  - Monitor Blood Glucose as ordered  - Assess for signs and symptoms of hyperglycemia and hypoglycemia  - Administer ordered medications to maintain glucose within target range  - Assess barriers to adequate nutritional intake and initiate nutrition consult as needed  - Instruct patient on self management of diabetes  Outcome: Progressing  Goal: Electrolytes maintained within normal limits  Description: INTERVENTIONS:  - Monitor labs and rhythm and assess patient for signs and symptoms of electrolyte imbalances  - Administer electrolyte replacement as ordered  - Monitor response to electrolyte replacements, including rhythm and repeat lab results as appropriate  - Fluid restriction as ordered  - Instruct patient on fluid and nutrition restrictions as appropriate  Outcome: Progressing     Problem: HEMATOLOGIC -  ADULT  Goal: Maintains hematologic stability  Description: INTERVENTIONS  - Assess for signs and symptoms of bleeding or hemorrhage  - Monitor labs and vital signs for trends  - Administer supportive blood products/factors, fluids and medications as ordered and appropriate  - Administer supportive blood products/factors as ordered and appropriate  Outcome: Progressing     Problem: SAFETY ADULT - FALL  Goal: Free from fall injury  Description: INTERVENTIONS:  - Assess pt frequently for physical needs  - Identify cognitive and physical deficits and behaviors that affect risk of falls.  - [place] fall precautions as indicated by assessment.  - Educate pt/family on patient safety including physical limitations  - Instruct pt to call for assistance with activity based on assessment  - Modify environment to reduce risk of injury  - Provide assistive devices as appropriate  - Consider OT/PT consult to assist with strengthening/mobility  - Encourage toileting schedule  Outcome: Progressing     Problem: DISCHARGE PLANNING  Goal: Discharge to home or other facility with appropriate resources  Description: INTERVENTIONS:  - Identify barriers to discharge w/pt and caregiver  - Include patient/family/discharge partner in discharge planning  - Arrange for needed discharge resources and transportation as appropriate  - Identify discharge learning needs (meds, wound care, etc)  - Arrange for interpreters to assist at discharge as needed  - Consider post-discharge preferences of patient/family/discharge partner  - Complete POLST form as appropriate  - Assess patient's ability to be responsible for managing their own health  - Refer to Case Management Department for coordinating discharge planning if the patient needs post-hospital services based on physician/LIP order or complex needs related to functional status, cognitive ability or social support system  Outcome: Progressing     Problem: SKIN/TISSUE INTEGRITY - ADULT  Goal:  Incision(s), wounds(s) or drain site(s) healing without S/S of infection  Description: INTERVENTIONS:  - Assess and document risk factors for pressure ulcer development  - Assess and document skin integrity  - Assess and document dressing/incision, wound bed, drain sites and surrounding tissue  - Implement wound care per orders  - Initiate isolation precautions as appropriate  - Initiate Pressure Ulcer prevention bundle as indicated  Outcome: Progressing     Problem: Impaired Swallowing  Goal: Minimize aspiration risk  Description: Interventions:  - Patient should be alert and upright for all feedings (90 degrees preferred)  - Offer food and liquids at a slow rate  - No straws  - Encourage small bites of food and small sips of liquid  - Offer pills one at a time, crush or deliver with applesauce as needed  - Discontinue feeding and notify MD (or speech pathologist) if coughing or persistent throat clearing or wet/gurgly vocal quality is noted  Outcome: Progressing

## 2024-03-28 NOTE — PROGRESS NOTES
[facility]  part of [facility]    Progress Note    Talib Gonzalez Patient Status:  Inpatient    1950 MRN [id number]   Location [facility] 2W/SW Attending Cesar Cortez,*   Hosp Day # 7 PCP No primary care provider on file.     Subjective:  Pt sitting in the chair w/his wife at bedside. He just returned from walking in the hallway this morning. He has no complaints today and feels ready to go home. Pt states his stools are dark colored: suspect due to Iron     Objective:  /76 (BP Location: Right arm)   Pulse 84   Temp 97.7 °F (36.5 °C) (Temporal)   Resp 20   Ht 6' (1.829 m)   Wt 234 lb 12.6 oz (106.5 kg)   SpO2 95%   BMI 31.84 kg/m²     Temp (24hrs), Av.6 °F (37 °C), Min:97.7 °F (36.5 °C), Max:99.7 °F (37.6 °C)      Intake/Output:    Intake/Output Summary (Last 24 hours) at 3/28/2024 1010  Last data filed at 3/28/2024 0600  Gross per 24 hour   Intake 1031.63 ml   Output 250 ml   Net 781.63 ml       Wt Readings from Last 3 Encounters:   24 234 lb 12.6 oz (106.5 kg)       Allergies:  Allergies   Allergen Reactions    Serotonin Reuptake Inhibitors DIARRHEA       Labs:  Lab Results   Component Value Date    WBC 11.3 2024    HGB 9.2 2024    HCT 27.1 2024    .0 2024    CREATSERUM 0.74 2024    BUN 15 2024     2024    K 3.7 2024     2024    CO2 23.0 2024     2024    CA 8.8 2024    MG 1.6 2024    PHOS 2.4 2024       Physical Exam:  Blood pressure 132/76, pulse 84, temperature 97.7 °F (36.5 °C), temperature source Temporal, resp. rate 20, height 6' (1.829 m), weight 234 lb 12.6 oz (106.5 kg), SpO2 95%.  General: NAD  Neck: No JVD  Lungs: Clear bilaterally and diminished  Heart: RRR, S1, S2  Abdomen: Soft/Round, NT/ND, BS+x 4/+Flatus/+BM   Extremities: Warm, dry, no LE edema bilat  Pulses: 2+ bilat DP  Skin: sternotomy incision drsg: CDI/Left  femoral drsg; CDI   Neurological:  AAOx3, MAEW    Assessment/Plan:  S/P EMERGENT TYPE A AORTIC DISSECTION REPAIR POD # 7  Encourage pulm toilet: IS - currently on room air lizzeth well. PA/LAT CXR pending  + smoker: continued to encourage smoking cessation/explained importance- pt agrees to quit and is ready  Pain meds as needed  Increase activity-oob to chair and ambulate- working with PT/OT-shower today  Scds/Lovenox prophylaxis DVT prevention   Swallow eval prior to oral intake due to prolonged intubation- follow recs  Expected post op volume overload: Lasix PRN. Suspect wgt discrepancies post op due to various scales used. Pt states pre op home xut=198 lbs.   Expected post op anemia w/o clinical significance/stable. Decrease Iron to daily for one month as pt anxious about stool being dark/slightly green colored  Hx: Anxiety- resumed home med regimen as pt described.   Intermittent post op AF- on Amio-currently SR  Discharge planning: pt lives w/his wife and has supportive children. Plan for home today after CXR w/HH visits including RN/PT/OT. SW/ involved w/planning. FU appt made for pt  Written and verbal info provided to pt regarding recovery care including incision care  Will review [place]  prior to narcotic prescription written.     Plan of care discussed w/patient/wife/RN and CV Surgeon: Dr. Darcy Curtis, APRN  3/28/2024  10:10 AM

## 2024-03-28 NOTE — CM/SW NOTE
LANA reached out to PT OT to confirm that they will see patient today. PT OT did confirm they will see patient today.  SW will await the updated anticipated needs. Previous plans were AIR, and then HH. PMR consult was ordered, and PMR did agree with recs of [facility] on 3/27. Pt is reserved with [facility] at this time      03/28/24 1000   Discharge disposition   Expected discharge disposition Home or Self   Post Acute Care Provider Residential   Discharge transportation Private car     Per request of CM's, LANA called FLORENCIO Landry, who updated SW of final DC planning. FLORENCIO informed that patient will be going home with HH PT OT RN services.  LANA reached out to [facility] liaison to inform of above and DC today.  [facility] informed SW that ZIA Null informed [facility] that patient will be going home with [facility] not [facility]. SW asked [facility] to clarify with patient, due to 3/23/24 SW note saying [facility] and [facility] referral system reserved for [facility]. [facility] liaison confirmed that patient wishes to go with [facility]. [facility] liaison requested new f2f to placed, due to 3/22/24 f2f requesting RN services.SW placed updated f2f for PT OT RN.       SW/CM to remain available for support and/or discharge planning.     Sarahi Najera, MSW, LSW   x [phone]

## 2024-03-28 NOTE — PHYSICAL THERAPY NOTE
PHYSICAL THERAPY TREATMENT NOTE - INPATIENT     Room Number: 234/234-A       Presenting Problem: aortic dissection status postsurgical repair on 3/22/24  Co-Morbidities : Anxiety    Problem List  Principal Problem:    Dissection of thoracoabdominal aorta (HCC)      PHYSICAL THERAPY ASSESSMENT   Patient demonstrates good  progress this session, goals  remain in progress.    Patient continues to function below baseline with bed mobility, transfers, gait, stair negotiation, maintaining seated position, standing prolonged periods, and performing household tasks.  Contributing factors to remaining limitations include decreased functional strength, decreased endurance/aerobic capacity, impaired standing balance, decreased muscular endurance, difficulty maintaining precautions, and medical status.  Next session anticipate patient to progress bed mobility, transfers, gait, and stair negotiation.  Physical Therapy will continue to follow patient for duration of hospitalization.    Patient continues to benefit from continued skilled PT services: at discharge to promote functional independence and safety with additional support and return home with home health PT.    PLAN  PT Treatment Plan: Bed mobility;Body mechanics;Energy conservation;Endurance;Patient education;Family education;Gait training;Stoop training;Stair training;Strengthening;Transfer training;Balance training;Range of motion  Frequency (Obs): Daily    SUBJECTIVE  Agreeable     OBJECTIVE  Precautions: Sternal    PAIN ASSESSMENT   Ratin  Location: incisional  Management Techniques: Activity promotion;Body mechanics;Repositioning    BALANCE  Static Sitting: Good  Dynamic Sitting: Good  Static Standing: Fair  Dynamic Standing: Fair -    ACTIVITY TOLERANCE  Pulse: 78  Heart Rate Source: Monitor     O2 WALK  Oxygen Therapy  SPO2% on Room Air at Rest: 96    AM-PAC '6-Clicks' INPATIENT SHORT FORM - BASIC MOBILITY  How much difficulty does the patient currently  have...  Patient Difficulty: Turning over in bed (including adjusting bedclothes, sheets and blankets)?: A Little   Patient Difficulty: Sitting down on and standing up from a chair with arms (e.g., wheelchair, bedside commode, etc.): A Little   Patient Difficulty: Moving from lying on back to sitting on the side of the bed?: A Little   How much help from another person does the patient currently need...   Help from Another: Moving to and from a bed to a chair (including a wheelchair)?: A Little   Help from Another: Need to walk in hospital room?: A Little   Help from Another: Climbing 3-5 steps with a railing?: A Lot     AM-PAC Score:  Raw Score: 17   Approx Degree of Impairment: 50.57%   Standardized Score (AM-PAC Scale): 42.13   CMS Modifier (G-Code): CK    FUNCTIONAL ABILITY STATUS  Functional Mobility/Gait Assessment  Gait Assistance:  (Stand by assist)  Distance (ft): 100 ft x 2  Assistive Device: Rolling walker  Pattern: Shuffle (decreased juliano speed, decreased step length)  Stairs: Stairs  How Many Stairs: 3  Device: Hand held assist  Assist: Contact guard assist (A x 2 to ascend; Min A x 2 to descend)  Pattern: Ascend and Descend  Ascend and Descend : Step to  Rolling: minimal assist  Supine to Sit: minimal assist  Sit to Supine: minimal assist  Sit to Stand: stand-by assist    Additional information: Session primarily focused on practicing bed mobility/log rolling, transfers from EOB, ambulation, and stair negotiation. Re-enforced education on post-op sternal precautions and use of cardiac pillow. Increased education and cues on sequencing provided to patient and wife for bed mobility. Wife demonstrating good carryover of education, able to assist patient as needed. During stair negotiation, educated patient on not utilizing BUEs on the handrail to pull himself up to the next step. Educated patient and family on providing UE support to stabilize patient in order for him to advance to the next step -  verbalized understanding. Per wife, two daughters will be assisting patient to get into the house upon returning home.    The patient's Approx Degree of Impairment: 50.57% has been calculated based on documentation in the [facility] '6 clicks' Inpatient Daily Activity Short Form.  Research supports that patients with this level of impairment may benefit from HHPT.  Final disposition will be made by interdisciplinary medical team.    Patient sitting in bedside chair with family present upon arrival. RN approved activity. Educated patient on POC and benefits of mobilization. Agreeable to participate. Patient reporting no pain.  Patient End of Session: Up in chair;Needs met;Call light within reach;RN aware of session/findings;All patient questions and concerns addressed;Family present    CURRENT GOALS   Goals to be met by: 24  Patient Goal Patient's self-stated goal is: return to PLOF   Goal #1 Patient is able to demonstrate supine - sit EOB @ level: supervision      Goal #1   Current Status  Min A   Goal #2 Patient is able to demonstrate transfers Sit to/from Stand at assistance level: supervision with walker - rolling      Goal #2  Current Status  SBA with RW   Goal #3 Patient is able to ambulate 150 feet with assist device: walker - rolling at assistance level: supervision   Goal #3   Current Status   ft x 2 with RW   Goal #4 Patient will negotiate curb w/ assistive device and supervision   Goal #4   Current Status  CGA x 2 ascend 3 stairs with HHA   Min A x 2 descend 3 stairs with HHA   Goal #5 Patient to demonstrate independence with home activity/exercise instructions provided to patient in preparation for discharge.   Goal #5   Current Status  Ongoing   Goal #6 Patient will demonstrate compliance toward post operative sternal precautions 100% of time to optimize healing- comprehension will be assessed with pt verbalizing 8 out of 8 precautions.    Goal #6  Current Status  Ongoing     Gait Trainin  minutes  Therapeutic Activity: 30 minutes

## 2024-03-28 NOTE — PLAN OF CARE
Problem: Patient Centered Care  Goal: Patient preferences are identified and integrated in the patient's plan of care  Description: Interventions:  - What would you like us to know as we care for you? From home with spouse Lucie, generally healthy overall  - Provide timely, complete, and accurate information to patient/family  - Incorporate patient and family knowledge, values, beliefs, and cultural backgrounds into the planning and delivery of care  - Encourage patient/family to participate in care and decision-making at the level they choose  - Honor patient and family perspectives and choices  Outcome: Completed     Problem: Patient/Family Goals  Goal: Patient/Family Long Term Goal  Description: Patient's Long Term Goal: fully recover and return home with wife and family    Interventions:  - discharge planning when appropriate  - safe management during post-op period  - See additional Care Plan goals for specific interventions  Outcome: Completed  Goal: Patient/Family Short Term Goal  Description: Patient's Short Term Goal: recover in immediate post-op period, safe BP and hemodynamic stability - goals discussed w/ wife at bedside while patient intubated/sedated    Interventions:   - maintain intubation/sedation overnight for POD#0  - continue monitoring via swan/aimee throughout POD#0-1  - See additional Care Plan goals for specific interventions  Outcome: Completed     Problem: CARDIOVASCULAR - ADULT  Goal: Maintains optimal cardiac output and hemodynamic stability  Description: INTERVENTIONS:  - Monitor vital signs, rhythm, and trends  - Monitor for bleeding, hypotension and signs of decreased cardiac output  - Evaluate effectiveness of vasoactive medications to optimize hemodynamic stability  - Monitor arterial and/or venous puncture sites for bleeding and/or hematoma  - Assess quality of pulses, skin color and temperature  - Assess for signs of decreased coronary artery perfusion - ex. Angina  -  Evaluate fluid balance, assess for edema, trend weights  Outcome: Completed  Goal: Absence of cardiac arrhythmias or at baseline  Description: INTERVENTIONS:  - Continuous cardiac monitoring, monitor vital signs, obtain 12 lead EKG if indicated  - Evaluate effectiveness of antiarrhythmic and heart rate control medications as ordered  - Initiate emergency measures for life threatening arrhythmias  - Monitor electrolytes and administer replacement therapy as ordered  Outcome: Completed     Problem: RESPIRATORY - ADULT  Goal: Achieves optimal ventilation and oxygenation  Description: INTERVENTIONS:  - Assess for changes in respiratory status  - Assess for changes in mentation and behavior  - Position to facilitate oxygenation and minimize respiratory effort  - Oxygen supplementation based on oxygen saturation or ABGs  - Provide Smoking Cessation handout, if applicable  - Encourage broncho-pulmonary hygiene including cough, deep breathe, Incentive Spirometry  - Assess the need for suctioning and perform as needed  - Assess and instruct to report SOB or any respiratory difficulty  - Respiratory Therapy support as indicated  - Manage/alleviate anxiety  - Monitor for signs/symptoms of CO2 retention  Outcome: Completed     Problem: GASTROINTESTINAL - ADULT  Goal: Minimal or absence of nausea and vomiting  Description: INTERVENTIONS:  - Maintain adequate hydration with IV or PO as ordered and tolerated  - Nasogastric tube to low intermittent suction as ordered  - Evaluate effectiveness of ordered antiemetic medications  - Provide nonpharmacologic comfort measures as appropriate  - Advance diet as tolerated, if ordered  - Obtain nutritional consult as needed  - Evaluate fluid balance  Outcome: Completed  Goal: Maintains or returns to baseline bowel function  Description: INTERVENTIONS:  - Assess bowel function  - Maintain adequate hydration with IV or PO as ordered and tolerated  - Evaluate effectiveness of GI medications  -  Encourage mobilization and activity  - Obtain nutritional consult as needed  - Establish a toileting routine/schedule  - Consider collaborating with pharmacy to review patient's medication profile  Outcome: Completed     Problem: METABOLIC/FLUID AND ELECTROLYTES - ADULT  Goal: Hemodynamic stability and optimal renal function maintained  Description: INTERVENTIONS:  - Monitor labs and assess for signs and symptoms of volume excess or deficit  - Monitor intake, output and patient weight  - Monitor urine specific gravity, serum osmolarity and serum sodium as indicated or ordered  - Monitor response to interventions for patient's volume status, including labs, urine output, blood pressure (other measures as available)  - Encourage oral intake as appropriate  - Instruct patient on fluid and nutrition restrictions as appropriate  Outcome: Completed  Goal: Glucose maintained within prescribed range  Description: INTERVENTIONS:  - Monitor Blood Glucose as ordered  - Assess for signs and symptoms of hyperglycemia and hypoglycemia  - Administer ordered medications to maintain glucose within target range  - Assess barriers to adequate nutritional intake and initiate nutrition consult as needed  - Instruct patient on self management of diabetes  Outcome: Completed  Goal: Electrolytes maintained within normal limits  Description: INTERVENTIONS:  - Monitor labs and rhythm and assess patient for signs and symptoms of electrolyte imbalances  - Administer electrolyte replacement as ordered  - Monitor response to electrolyte replacements, including rhythm and repeat lab results as appropriate  - Fluid restriction as ordered  - Instruct patient on fluid and nutrition restrictions as appropriate  Outcome: Completed     Problem: HEMATOLOGIC - ADULT  Goal: Maintains hematologic stability  Description: INTERVENTIONS  - Assess for signs and symptoms of bleeding or hemorrhage  - Monitor labs and vital signs for trends  - Administer  supportive blood products/factors, fluids and medications as ordered and appropriate  - Administer supportive blood products/factors as ordered and appropriate  Outcome: Completed     Problem: SAFETY ADULT - FALL  Goal: Free from fall injury  Description: INTERVENTIONS:  - Assess pt frequently for physical needs  - Identify cognitive and physical deficits and behaviors that affect risk of falls.  - [place] fall precautions as indicated by assessment.  - Educate pt/family on patient safety including physical limitations  - Instruct pt to call for assistance with activity based on assessment  - Modify environment to reduce risk of injury  - Provide assistive devices as appropriate  - Consider OT/PT consult to assist with strengthening/mobility  - Encourage toileting schedule  Outcome: Completed     Problem: DISCHARGE PLANNING  Goal: Discharge to home or other facility with appropriate resources  Description: INTERVENTIONS:  - Identify barriers to discharge w/pt and caregiver  - Include patient/family/discharge partner in discharge planning  - Arrange for needed discharge resources and transportation as appropriate  - Identify discharge learning needs (meds, wound care, etc)  - Arrange for interpreters to assist at discharge as needed  - Consider post-discharge preferences of patient/family/discharge partner  - Complete POLST form as appropriate  - Assess patient's ability to be responsible for managing their own health  - Refer to Case Management Department for coordinating discharge planning if the patient needs post-hospital services based on physician/LIP order or complex needs related to functional status, cognitive ability or social support system  Outcome: Completed     Problem: SKIN/TISSUE INTEGRITY - ADULT  Goal: Incision(s), wounds(s) or drain site(s) healing without S/S of infection  Description: INTERVENTIONS:  - Assess and document risk factors for pressure ulcer development  - Assess and document skin  integrity  - Assess and document dressing/incision, wound bed, drain sites and surrounding tissue  - Implement wound care per orders  - Initiate isolation precautions as appropriate  - Initiate Pressure Ulcer prevention bundle as indicated  Outcome: Completed     Problem: Impaired Swallowing  Goal: Minimize aspiration risk  Description: Interventions:  - Patient should be alert and upright for all feedings (90 degrees preferred)  - Offer food and liquids at a slow rate  - No straws  - Encourage small bites of food and small sips of liquid  - Offer pills one at a time, crush or deliver with applesauce as needed  - Discontinue feeding and notify MD (or speech pathologist) if coughing or persistent throat clearing or wet/gurgly vocal quality is noted  Outcome: Completed

## 2024-03-28 NOTE — DISCHARGE INSTRUCTIONS
Discharge Instructions:  Shower daily and clean your surgical incisions with soap and water daily then swab with Betadine 2 x day until your follow up office visit with Dr. Taveras's office  Do not drive for one month  Do not lift/push/pull greater than 10 lbs for 3 months  Do not submerge your incision in water such as tub, pool, etc until completely healed      Please send home on [facility] for all wound care and activity order     Medication List        START taking these medications      ascorbic acid 500 MG Tabs  Commonly known as: Vitamin C  Take 1 tablet (500 mg total) by mouth 3 (three) times daily.     aspirin 81 MG Chew  1 tablet (81 mg total) by Per G Tube route daily.  Start taking on: March 29, 2024  Replaces: aspirin 325 MG Tbec     ferrous sulfate 325 (65 FE) MG Tbec  Take 1 tablet (325 mg total) by mouth daily with breakfast.  Start taking on: March 29, 2024     HYDROcodone-acetaminophen 5-325 MG Tabs  Commonly known as: Norco  Take 1 tablet by mouth every 6 (six) hours as needed for Pain.     multivitamin Tabs  Take 1 tablet by mouth daily.  Start taking on: March 29, 2024     pantoprazole 40 MG Tbec  Commonly known as: Protonix  Take 1 tablet (40 mg total) by mouth every morning before breakfast.  Start taking on: March 29, 2024            CONTINUE taking these medications      clonazePAM 1 MG Tabs  Commonly known as: KlonoPIN     folic acid 1 MG Tabs  Commonly known as: Folvite            STOP taking these medications      aspirin 325 MG Tbec  Replaced by: aspirin 81 MG Chew     propranolol 10 MG Tabs  Commonly known as: Inderal               Where to Get Your Medications        These medications were sent to [facility] [id number] - [place], [place] - [address] [phone], [phone]  [address], [facility] [address]      Phone: [phone]   ascorbic acid 500 MG Tabs  aspirin 81 MG Chew  ferrous sulfate 325 (65 FE) MG Tbec  HYDROcodone-acetaminophen 5-325 MG  Tabs  multivitamin Tabs  pantoprazole 40 MG Tbec

## 2024-03-29 NOTE — OCCUPATIONAL THERAPY NOTE
OCCUPATIONAL THERAPY TREATMENT NOTE - INPATIENT        Room Number: 234/234-A  Number of Visits to Meet Established Goals: 3  Presenting Problem: type a aortic dissection status postsurgical repair    Problem List  Principal Problem:    Dissection of thoracoabdominal aorta (HCC)      OCCUPATIONAL THERAPY ASSESSMENT   Patient demonstrates good  progress this session, goals remain in progress.    Patient continues to function below baseline with ADLs, functional transfers/mobility.   Contributing factors to remaining limitations include decreased functional strength, decreased functional reach, decreased endurance, and impaired   balance. And sternal prec.  Next session anticipate patient to progress bed mobility, transfers, functional standing tolerance, and energy conservation strategies.  Occupational Therapy will continue to follow patient for duration of hospitalization.    Patient continues to benefit from continued skilled OT services: at discharge to promote functional independence and safety with additional support and return home with home health OT.     PLAN  OT Treatment Plan: Balance activities;Energy conservation/work simplification techniques;IADL training;ADL training;Visual perceptual training;Functional transfer training;UE strengthening/ROM;Patient/Family education;Patient/Family training  OT Device Recommendations: TBD    SUBJECTIVE  \"I just had a shower\"    OBJECTIVE  Precautions: Sternal      PAIN ASSESSMENT  Ratin  Location: chest  Management Techniques: Activity promotion; Body mechanics; Repositioning; Nurse notified    Vitals  SPO2 95%, /76,  BPM    ACTIVITIES OF DAILY LIVING ASSESSMENT  AM-PAC ‘6-Clicks’ Inpatient Daily Activity Short Form  How much help from another person does the patient currently need…  -   Putting on and taking off regular lower body clothing?: A Little  -   Bathing (including washing, rinsing, drying)?: A Little  -   Toileting, which includes using  toilet, bedpan or urinal? : None  -   Putting on and taking off regular upper body clothing?: A Little  -   Taking care of personal grooming such as brushing teeth?: None  -   Eating meals?: None    AM-PAC Score:  Score: 21  Approx Degree of Impairment: 32.79%  Standardized Score (AM-PAC Scale): 44.27  CMS Modifier (G-Code): CJ    BED MOBILITY  Supine to Sit : Minimal Assist  Sit to Supine (OT): Minimal Assist  X4 trials, educated on technique/log roll and how to maintain sternal prec    THERAPEUTIC EXERCISE     Skilled Therapy Provided: Received in chair, RN approved session. Wife and dtrs present. Pt performed UB ADLs with CGA cues for maintaining sternal prec, slip on shoes with set up, bed mobility as stated above. Functional mobility activity with CGA progressing to SB and FWW. Functional transfers x4 trials with cGA and cues for technique to enhance safety/maintain sternal prec. Vitals stable throughout. Pt and family had several questions on functional transfer techs, bed mobility and how to assist pt safely with ADLs. Thorough education provided. Pt progressing nicely towards goals. Left in chair at end of session.    EDUCATION PROVIDED  Patient: Role of Occupational Therapy; Discharge Recommendations; Plan of Care; Adaptive Equipment Recommendations; Functional Transfer Techniques; Fall Prevention; Surgical Precautions; Posture/Positioning; Energy Conservation; Compensatory ADL Techniques  Patient's Response to Education: Verbalized Understanding; Returned Demonstration  Family/Caregiver: Role of Occupational Therapy; Plan of Care; Discharge Recommendations; Adaptive Equipment Recommendations; Functional Transfer Techniques; Surgical Precautions; Posture/Positioning; Energy Conservation; Compensatory ADL Techniques  Family/Caregiver's Response to Education: Verbalized Understanding; Returned Demonstration    The patient's Approx Degree of Impairment: 32.79% has been calculated based on documentation in the  [facility] '6 clicks' Inpatient Daily Activity Short Form.  Research supports that patients with this level of impairment may benefit from HHOT.  Final disposition will be made by interdisciplinary medical team.    Patient End of Session: Up in chair;Needs met;Call light within reach;RN aware of session/findings;All patient questions and concerns addressed;Family present    OT Goals ongoing  Patients self stated goal is: To get stronger                 Patient will complete functional transfer with MOD I   Comment: Progressing    Patient will complete toileting with MOD I   Comment: Progressing    Patient will tolerate standing for 5 minutes in prep for adls with MOD I    Comment: Progressing    Patient will complete item retrieval with MOD I   Comment:Progressing                  OT Session Time: 45 minutes  Self-Care Home Management: 10 minutes  Therapeutic Activity: 35 minutes    MACEY Michel/L

## 2024-03-29 NOTE — DISCHARGE SUMMARY
[facility]    PATIENT'S NAME: MADELINE MAGAÑA   ATTENDING PHYSICIAN: Cesar Cortez MD   PATIENT ACCOUNT#:   [id number]    LOCATION:  [address] [place] [address]  MEDICAL RECORD #:   [id number]       YOB: 1950  ADMISSION DATE:       03/21/2024      DISCHARGE DATE:  03/28/2024    DISCHARGE SUMMARY    More than 45 minutes were spent preparing this discharge.    DISCHARGE DIAGNOSES:  Status post type A aortic dissection with surgical repair.    HISTORY AND HOSPITAL COURSE:  This is a very pleasant 73-year-old white male who presents with a history of having severe pain in his abdomen and tingling in his extremities this morning.  His wife called 911 and he was found to have an extensive descending thoracic-abdominal aorta dissection and partially occluded the SMA.  He was admitted to the hospital and had a heroic procedure performed by Dr. Taveras, who repaired this ruptured aneurysm and placed a graft.  The patient really was also seen by Pulmonary and he was kept in intensive care.  He did remarkably well.  His pain was controlled.  He did have some issues with coughing.  It was initially thought that he would go to acute rehab, but he improved to the point that he was able to walk around and go home with home health, really after a heroic cure by Dr. Taveras.  Cardiology also followed along, they saw him for some paroxysmal atrial fibrillation that was treated with amiodarone.     PHYSICAL EXAMINATION ON DISCHARGE:    VITAL SIGNS:  Temperature 97.7, pulse 78, respiratory rate 30, blood pressure 132/76, 95%.  LUNGS:  Occasional rhonchi.  HEART:  Normal S1 and S2.  No S3.    ABDOMEN:  Soft and nontender.  EXTREMITIES:  Without calf tenderness.  NEUROLOGIC:  He is alert and oriented, friendly and cooperative.    LABORATORY STUDIES:  Please see chart.    ASSESSMENT AND PLAN:    1.   Ruptured  thoracic-abdominal aortic aneurysm, massive.  Status post repair.  Patient doing well.    2.   Paroxysmal atrial  fibrillation.  Controlled as per Cardiology.  Please note, Cardiology will be responsible for all dosing, renewal, and continuation of amiodarone.   3.   Obesity.  BMI 31.84.  May need LAITH workup.  4.   Acute blood loss anemia, as expected.   5.   Thrombocytopenia.  Stable.  6.   Hypernatremia.  Now better.  Push water.     7.   Hyperchloremia.  Better.  8.   DVT prophylaxis with Lovenox.  9.   Hyperglycemia from stress.  Sliding scale.     CONDITION ON DISCHARGE:  Stable.    CODE STATUS:  Not discussed during this hospitalization.    ACTIVITY:  No heavy exercise, otherwise as tolerated.      DIET:  Low fat, low salt.      FOLLOWUP:  Follow up with Dr. Saul in a few days.  Follow up with Dr. Taveras as he wishes on April 17.  Follow up with Cardiology on April 9 and April 23; please note, Cardiology will be responsible for all dosing, continuation, renewal of amiodarone.  Followup with Dr. Rubi only as needed.  Return if fevers, chills, sweats, nausea, vomiting, chest pain, shortness of breath, or other complaints.     DISCHARGE MEDICATIONS:    1.   Folate 1 mg daily.  2.   Clonazepam 0.5 mg 3 times a day as needed for anxiety.  Watch for drowsiness.  No alcohol.  3.   Ecotrin 81 mg daily.   4.   Amiodarone, as per Cardiology, 200 mg twice a day for 7 days, then 200 mg daily.  Cardiology will continue dose renew, medication as only per Cardiology.  5.   Vitamin C 500 mg 3 times a day.  6.   Ferrous sulfate 325 mg daily.  7.   Norco 5/325 one tablet every 6 hours as needed for pain.  Watch total daily Tylenol limit to 3 g.  Watch for drowsiness, no alcohol.  8.   Metoprolol 50 mg twice a day.  9.   Multivitamin once a day.  10.   Protonix 40 mg every day.    RISK OF READMISSION:  High.  TCM followup recommended.    Dictated By Cesar Cortez MD  d: 03/28/2024 19:40:10  t: 03/29/2024 07:11:53  Job [id number]  [facility]/

## 2024-04-02 ENCOUNTER — TELEPHONE (OUTPATIENT)
Dept: MEDSURG UNIT | Facility: HOSPITAL | Age: 74
End: 2024-04-02

## 2024-04-09 ENCOUNTER — LAB ENCOUNTER (OUTPATIENT)
Dept: LAB | Facility: HOSPITAL | Age: 74
End: 2024-04-09
Attending: NURSE PRACTITIONER
Payer: MEDICARE

## 2024-04-09 DIAGNOSIS — I71.02 DISSECTION OF AORTA, ABDOMINAL (HCC): ICD-10-CM

## 2024-04-09 DIAGNOSIS — I71.03 DISSECTION OF AORTA, THORACOABDOMINAL (HCC): Primary | ICD-10-CM

## 2024-04-09 DIAGNOSIS — R60.9 EDEMA: ICD-10-CM

## 2024-04-09 LAB
ALBUMIN SERPL-MCNC: 3.6 G/DL (ref 3.2–4.8)
ALBUMIN/GLOB SERPL: 1 {RATIO} (ref 1–2)
ALP LIVER SERPL-CCNC: 83 U/L
ALT SERPL-CCNC: 44 U/L
ANION GAP SERPL CALC-SCNC: 3 MMOL/L (ref 0–18)
AST SERPL-CCNC: 24 U/L (ref ?–34)
BASOPHILS # BLD AUTO: 0.06 X10(3) UL (ref 0–0.2)
BASOPHILS NFR BLD AUTO: 0.8 %
BILIRUB SERPL-MCNC: 0.5 MG/DL (ref 0.2–1.1)
BUN BLD-MCNC: 11 MG/DL (ref 9–23)
BUN/CREAT SERPL: 11.3 (ref 10–20)
CALCIUM BLD-MCNC: 9.2 MG/DL (ref 8.7–10.4)
CHLORIDE SERPL-SCNC: 107 MMOL/L (ref 98–112)
CO2 SERPL-SCNC: 27 MMOL/L (ref 21–32)
CREAT BLD-MCNC: 0.97 MG/DL
DEPRECATED RDW RBC AUTO: 50.1 FL (ref 35.1–46.3)
EGFRCR SERPLBLD CKD-EPI 2021: 82 ML/MIN/1.73M2 (ref 60–?)
EOSINOPHIL # BLD AUTO: 0.17 X10(3) UL (ref 0–0.7)
EOSINOPHIL NFR BLD AUTO: 2.2 %
ERYTHROCYTE [DISTWIDTH] IN BLOOD BY AUTOMATED COUNT: 13.9 % (ref 11–15)
FASTING STATUS PATIENT QL REPORTED: NO
GLOBULIN PLAS-MCNC: 3.5 G/DL (ref 2.8–4.4)
GLUCOSE BLD-MCNC: 109 MG/DL (ref 70–99)
HCT VFR BLD AUTO: 31.6 %
HGB BLD-MCNC: 9.9 G/DL
IMM GRANULOCYTES # BLD AUTO: 0.03 X10(3) UL (ref 0–1)
IMM GRANULOCYTES NFR BLD: 0.4 %
LYMPHOCYTES # BLD AUTO: 1.63 X10(3) UL (ref 1–4)
LYMPHOCYTES NFR BLD AUTO: 21.1 %
MCH RBC QN AUTO: 30.8 PG (ref 26–34)
MCHC RBC AUTO-ENTMCNC: 31.3 G/DL (ref 31–37)
MCV RBC AUTO: 98.4 FL
MONOCYTES # BLD AUTO: 0.72 X10(3) UL (ref 0.1–1)
MONOCYTES NFR BLD AUTO: 9.3 %
NEUTROPHILS # BLD AUTO: 5.13 X10 (3) UL (ref 1.5–7.7)
NEUTROPHILS # BLD AUTO: 5.13 X10(3) UL (ref 1.5–7.7)
NEUTROPHILS NFR BLD AUTO: 66.2 %
OSMOLALITY SERPL CALC.SUM OF ELEC: 284 MOSM/KG (ref 275–295)
PLATELET # BLD AUTO: 404 10(3)UL (ref 150–450)
POTASSIUM SERPL-SCNC: 4 MMOL/L (ref 3.5–5.1)
PROT SERPL-MCNC: 7.1 G/DL (ref 5.7–8.2)
RBC # BLD AUTO: 3.21 X10(6)UL
SODIUM SERPL-SCNC: 137 MMOL/L (ref 136–145)
WBC # BLD AUTO: 7.7 X10(3) UL (ref 4–11)

## 2024-04-09 PROCEDURE — 36415 COLL VENOUS BLD VENIPUNCTURE: CPT

## 2024-04-09 PROCEDURE — 80053 COMPREHEN METABOLIC PANEL: CPT

## 2024-04-09 PROCEDURE — 85025 COMPLETE CBC W/AUTO DIFF WBC: CPT

## 2024-04-24 ENCOUNTER — ORDER TRANSCRIPTION (OUTPATIENT)
Dept: CARDIAC REHAB | Facility: HOSPITAL | Age: 74
End: 2024-04-24

## 2024-04-24 ENCOUNTER — CARDPULM VISIT (OUTPATIENT)
Dept: CARDIAC REHAB | Facility: HOSPITAL | Age: 74
End: 2024-04-24
Attending: INTERNAL MEDICINE
Payer: MEDICARE

## 2024-04-24 DIAGNOSIS — Z98.890 S/P AORTIC DISSECTION REPAIR: Primary | ICD-10-CM

## 2024-05-10 ENCOUNTER — APPOINTMENT (OUTPATIENT)
Dept: CARDIAC REHAB | Facility: HOSPITAL | Age: 74
End: 2024-05-10
Attending: INTERNAL MEDICINE
Payer: MEDICARE

## 2024-05-13 ENCOUNTER — APPOINTMENT (OUTPATIENT)
Dept: CARDIAC REHAB | Facility: HOSPITAL | Age: 74
End: 2024-05-13
Attending: INTERNAL MEDICINE
Payer: MEDICARE

## 2024-05-15 ENCOUNTER — APPOINTMENT (OUTPATIENT)
Dept: CARDIAC REHAB | Facility: HOSPITAL | Age: 74
End: 2024-05-15
Attending: INTERNAL MEDICINE
Payer: MEDICARE

## 2024-05-17 ENCOUNTER — APPOINTMENT (OUTPATIENT)
Dept: CARDIAC REHAB | Facility: HOSPITAL | Age: 74
End: 2024-05-17
Attending: INTERNAL MEDICINE
Payer: MEDICARE

## 2024-05-20 ENCOUNTER — APPOINTMENT (OUTPATIENT)
Dept: CARDIAC REHAB | Facility: HOSPITAL | Age: 74
End: 2024-05-20
Attending: INTERNAL MEDICINE
Payer: MEDICARE

## 2024-05-22 ENCOUNTER — APPOINTMENT (OUTPATIENT)
Dept: CARDIAC REHAB | Facility: HOSPITAL | Age: 74
End: 2024-05-22
Attending: INTERNAL MEDICINE
Payer: MEDICARE

## 2024-05-24 ENCOUNTER — APPOINTMENT (OUTPATIENT)
Dept: CARDIAC REHAB | Facility: HOSPITAL | Age: 74
End: 2024-05-24
Attending: INTERNAL MEDICINE
Payer: MEDICARE

## 2024-06-03 ENCOUNTER — APPOINTMENT (OUTPATIENT)
Dept: CARDIAC REHAB | Facility: HOSPITAL | Age: 74
End: 2024-06-03
Attending: INTERNAL MEDICINE
Payer: MEDICARE

## 2024-06-05 ENCOUNTER — APPOINTMENT (OUTPATIENT)
Dept: CARDIAC REHAB | Facility: HOSPITAL | Age: 74
End: 2024-06-05
Attending: INTERNAL MEDICINE
Payer: MEDICARE

## 2024-06-07 ENCOUNTER — APPOINTMENT (OUTPATIENT)
Dept: CARDIAC REHAB | Facility: HOSPITAL | Age: 74
End: 2024-06-07
Attending: INTERNAL MEDICINE
Payer: MEDICARE

## 2024-06-10 ENCOUNTER — APPOINTMENT (OUTPATIENT)
Dept: CARDIAC REHAB | Facility: HOSPITAL | Age: 74
End: 2024-06-10
Attending: INTERNAL MEDICINE
Payer: MEDICARE

## 2024-06-12 ENCOUNTER — APPOINTMENT (OUTPATIENT)
Dept: CARDIAC REHAB | Facility: HOSPITAL | Age: 74
End: 2024-06-12
Attending: INTERNAL MEDICINE
Payer: MEDICARE

## 2024-06-14 ENCOUNTER — APPOINTMENT (OUTPATIENT)
Dept: CARDIAC REHAB | Facility: HOSPITAL | Age: 74
End: 2024-06-14
Attending: INTERNAL MEDICINE
Payer: MEDICARE

## 2024-06-17 ENCOUNTER — APPOINTMENT (OUTPATIENT)
Dept: CARDIAC REHAB | Facility: HOSPITAL | Age: 74
End: 2024-06-17
Attending: INTERNAL MEDICINE
Payer: MEDICARE

## 2024-06-19 ENCOUNTER — APPOINTMENT (OUTPATIENT)
Dept: CARDIAC REHAB | Facility: HOSPITAL | Age: 74
End: 2024-06-19
Attending: INTERNAL MEDICINE
Payer: MEDICARE

## 2024-06-21 ENCOUNTER — APPOINTMENT (OUTPATIENT)
Dept: CARDIAC REHAB | Facility: HOSPITAL | Age: 74
End: 2024-06-21
Attending: INTERNAL MEDICINE
Payer: MEDICARE

## 2024-06-24 ENCOUNTER — APPOINTMENT (OUTPATIENT)
Dept: CARDIAC REHAB | Facility: HOSPITAL | Age: 74
End: 2024-06-24
Attending: INTERNAL MEDICINE
Payer: MEDICARE

## 2024-06-26 ENCOUNTER — APPOINTMENT (OUTPATIENT)
Dept: CARDIAC REHAB | Facility: HOSPITAL | Age: 74
End: 2024-06-26
Attending: INTERNAL MEDICINE
Payer: MEDICARE

## 2024-06-28 ENCOUNTER — APPOINTMENT (OUTPATIENT)
Dept: CARDIAC REHAB | Facility: HOSPITAL | Age: 74
End: 2024-06-28
Attending: INTERNAL MEDICINE
Payer: MEDICARE

## 2024-07-01 ENCOUNTER — APPOINTMENT (OUTPATIENT)
Dept: CARDIAC REHAB | Facility: HOSPITAL | Age: 74
End: 2024-07-01
Attending: INTERNAL MEDICINE
Payer: MEDICARE

## 2024-07-03 ENCOUNTER — APPOINTMENT (OUTPATIENT)
Dept: CARDIAC REHAB | Facility: HOSPITAL | Age: 74
End: 2024-07-03
Attending: INTERNAL MEDICINE
Payer: MEDICARE

## 2024-07-05 ENCOUNTER — APPOINTMENT (OUTPATIENT)
Dept: CARDIAC REHAB | Facility: HOSPITAL | Age: 74
End: 2024-07-05
Attending: INTERNAL MEDICINE
Payer: MEDICARE

## 2024-07-08 ENCOUNTER — APPOINTMENT (OUTPATIENT)
Dept: CARDIAC REHAB | Facility: HOSPITAL | Age: 74
End: 2024-07-08
Attending: INTERNAL MEDICINE
Payer: MEDICARE

## 2024-07-10 ENCOUNTER — APPOINTMENT (OUTPATIENT)
Dept: CARDIAC REHAB | Facility: HOSPITAL | Age: 74
End: 2024-07-10
Attending: INTERNAL MEDICINE
Payer: MEDICARE

## 2024-07-12 ENCOUNTER — APPOINTMENT (OUTPATIENT)
Dept: CARDIAC REHAB | Facility: HOSPITAL | Age: 74
End: 2024-07-12
Attending: INTERNAL MEDICINE
Payer: MEDICARE

## 2024-07-15 ENCOUNTER — APPOINTMENT (OUTPATIENT)
Dept: CARDIAC REHAB | Facility: HOSPITAL | Age: 74
End: 2024-07-15
Attending: INTERNAL MEDICINE
Payer: MEDICARE

## 2024-07-17 ENCOUNTER — APPOINTMENT (OUTPATIENT)
Dept: CARDIAC REHAB | Facility: HOSPITAL | Age: 74
End: 2024-07-17
Attending: INTERNAL MEDICINE
Payer: MEDICARE

## 2024-07-19 ENCOUNTER — APPOINTMENT (OUTPATIENT)
Dept: CARDIAC REHAB | Facility: HOSPITAL | Age: 74
End: 2024-07-19
Attending: INTERNAL MEDICINE
Payer: MEDICARE

## 2024-07-22 ENCOUNTER — APPOINTMENT (OUTPATIENT)
Dept: CARDIAC REHAB | Facility: HOSPITAL | Age: 74
End: 2024-07-22
Attending: INTERNAL MEDICINE
Payer: MEDICARE

## 2024-07-24 ENCOUNTER — APPOINTMENT (OUTPATIENT)
Dept: CARDIAC REHAB | Facility: HOSPITAL | Age: 74
End: 2024-07-24
Attending: INTERNAL MEDICINE
Payer: MEDICARE

## 2024-07-26 ENCOUNTER — APPOINTMENT (OUTPATIENT)
Dept: CARDIAC REHAB | Facility: HOSPITAL | Age: 74
End: 2024-07-26
Attending: INTERNAL MEDICINE
Payer: MEDICARE

## 2024-07-29 ENCOUNTER — APPOINTMENT (OUTPATIENT)
Dept: CARDIAC REHAB | Facility: HOSPITAL | Age: 74
End: 2024-07-29
Attending: INTERNAL MEDICINE
Payer: MEDICARE

## 2024-07-31 ENCOUNTER — APPOINTMENT (OUTPATIENT)
Dept: CARDIAC REHAB | Facility: HOSPITAL | Age: 74
End: 2024-07-31
Attending: INTERNAL MEDICINE
Payer: MEDICARE

## 2024-08-02 ENCOUNTER — APPOINTMENT (OUTPATIENT)
Dept: CARDIAC REHAB | Facility: HOSPITAL | Age: 74
End: 2024-08-02
Attending: INTERNAL MEDICINE
Payer: MEDICARE

## 2024-08-05 ENCOUNTER — APPOINTMENT (OUTPATIENT)
Dept: CARDIAC REHAB | Facility: HOSPITAL | Age: 74
End: 2024-08-05
Attending: INTERNAL MEDICINE
Payer: MEDICARE

## 2024-08-07 ENCOUNTER — APPOINTMENT (OUTPATIENT)
Dept: CARDIAC REHAB | Facility: HOSPITAL | Age: 74
End: 2024-08-07
Attending: INTERNAL MEDICINE
Payer: MEDICARE

## 2024-08-09 ENCOUNTER — APPOINTMENT (OUTPATIENT)
Dept: CARDIAC REHAB | Facility: HOSPITAL | Age: 74
End: 2024-08-09
Attending: INTERNAL MEDICINE
Payer: MEDICARE

## 2024-08-12 ENCOUNTER — APPOINTMENT (OUTPATIENT)
Dept: CARDIAC REHAB | Facility: HOSPITAL | Age: 74
End: 2024-08-12
Attending: INTERNAL MEDICINE
Payer: MEDICARE

## 2024-08-14 ENCOUNTER — APPOINTMENT (OUTPATIENT)
Dept: CARDIAC REHAB | Facility: HOSPITAL | Age: 74
End: 2024-08-14
Attending: INTERNAL MEDICINE
Payer: MEDICARE

## 2024-08-16 ENCOUNTER — APPOINTMENT (OUTPATIENT)
Dept: CARDIAC REHAB | Facility: HOSPITAL | Age: 74
End: 2024-08-16
Attending: INTERNAL MEDICINE
Payer: MEDICARE

## 2024-08-19 ENCOUNTER — APPOINTMENT (OUTPATIENT)
Dept: CARDIAC REHAB | Facility: HOSPITAL | Age: 74
End: 2024-08-19
Attending: INTERNAL MEDICINE
Payer: MEDICARE

## 2024-08-21 ENCOUNTER — APPOINTMENT (OUTPATIENT)
Dept: CARDIAC REHAB | Facility: HOSPITAL | Age: 74
End: 2024-08-21
Attending: INTERNAL MEDICINE
Payer: MEDICARE

## 2024-08-23 ENCOUNTER — APPOINTMENT (OUTPATIENT)
Dept: CARDIAC REHAB | Facility: HOSPITAL | Age: 74
End: 2024-08-23
Attending: INTERNAL MEDICINE
Payer: MEDICARE

## 2024-08-26 ENCOUNTER — APPOINTMENT (OUTPATIENT)
Dept: CARDIAC REHAB | Facility: HOSPITAL | Age: 74
End: 2024-08-26
Attending: INTERNAL MEDICINE
Payer: MEDICARE

## 2024-08-28 ENCOUNTER — APPOINTMENT (OUTPATIENT)
Dept: CARDIAC REHAB | Facility: HOSPITAL | Age: 74
End: 2024-08-28
Attending: INTERNAL MEDICINE
Payer: MEDICARE

## 2024-08-30 ENCOUNTER — APPOINTMENT (OUTPATIENT)
Dept: CARDIAC REHAB | Facility: HOSPITAL | Age: 74
End: 2024-08-30
Attending: INTERNAL MEDICINE
Payer: MEDICARE

## 2024-09-02 ENCOUNTER — APPOINTMENT (OUTPATIENT)
Dept: CARDIAC REHAB | Facility: HOSPITAL | Age: 74
End: 2024-09-02
Attending: INTERNAL MEDICINE
Payer: MEDICARE

## 2024-09-04 ENCOUNTER — APPOINTMENT (OUTPATIENT)
Dept: CARDIAC REHAB | Facility: HOSPITAL | Age: 74
End: 2024-09-04
Attending: INTERNAL MEDICINE
Payer: MEDICARE

## 2024-09-06 ENCOUNTER — APPOINTMENT (OUTPATIENT)
Dept: CARDIAC REHAB | Facility: HOSPITAL | Age: 74
End: 2024-09-06
Attending: INTERNAL MEDICINE
Payer: MEDICARE

## 2025-04-17 ENCOUNTER — LAB ENCOUNTER (OUTPATIENT)
Dept: LAB | Facility: HOSPITAL | Age: 75
End: 2025-04-17
Attending: INTERNAL MEDICINE
Payer: MEDICARE

## 2025-04-17 DIAGNOSIS — E11.9 DIABETES MELLITUS (HCC): Primary | ICD-10-CM

## 2025-04-17 LAB
ALBUMIN SERPL-MCNC: 4.4 G/DL (ref 3.2–4.8)
ALBUMIN/GLOB SERPL: 1.5 {RATIO} (ref 1–2)
ALP LIVER SERPL-CCNC: 57 U/L (ref 45–117)
ALT SERPL-CCNC: 25 U/L (ref 10–49)
ANION GAP SERPL CALC-SCNC: 5 MMOL/L (ref 0–18)
AST SERPL-CCNC: 34 U/L (ref ?–34)
BASOPHILS # BLD AUTO: 0.06 X10(3) UL (ref 0–0.2)
BASOPHILS NFR BLD AUTO: 0.8 %
BILIRUB SERPL-MCNC: 0.7 MG/DL (ref 0.2–1.1)
BUN BLD-MCNC: 12 MG/DL (ref 9–23)
BUN/CREAT SERPL: 13 (ref 10–20)
CALCIUM BLD-MCNC: 9.3 MG/DL (ref 8.7–10.4)
CHLORIDE SERPL-SCNC: 102 MMOL/L (ref 98–112)
CHOLEST SERPL-MCNC: 149 MG/DL (ref ?–200)
CO2 SERPL-SCNC: 31 MMOL/L (ref 21–32)
CREAT BLD-MCNC: 0.92 MG/DL (ref 0.7–1.3)
CREAT UR-SCNC: 55 MG/DL
DEPRECATED RDW RBC AUTO: 47.6 FL (ref 35.1–46.3)
EGFRCR SERPLBLD CKD-EPI 2021: 87 ML/MIN/1.73M2 (ref 60–?)
EOSINOPHIL # BLD AUTO: 0.39 X10(3) UL (ref 0–0.7)
EOSINOPHIL NFR BLD AUTO: 5 %
ERYTHROCYTE [DISTWIDTH] IN BLOOD BY AUTOMATED COUNT: 13.3 % (ref 11–15)
EST. AVERAGE GLUCOSE BLD GHB EST-MCNC: 105 MG/DL (ref 68–126)
FASTING PATIENT LIPID ANSWER: YES
FASTING STATUS PATIENT QL REPORTED: YES
GLOBULIN PLAS-MCNC: 2.9 G/DL (ref 2–3.5)
GLUCOSE BLD-MCNC: 108 MG/DL (ref 70–99)
HBA1C MFR BLD: 5.3 % (ref ?–5.7)
HCT VFR BLD AUTO: 42.2 % (ref 39–53)
HDLC SERPL-MCNC: 62 MG/DL (ref 40–59)
HGB BLD-MCNC: 14.1 G/DL (ref 13–17.5)
IMM GRANULOCYTES # BLD AUTO: 0.02 X10(3) UL (ref 0–1)
IMM GRANULOCYTES NFR BLD: 0.3 %
LDLC SERPL CALC-MCNC: 78 MG/DL (ref ?–100)
LYMPHOCYTES # BLD AUTO: 2.21 X10(3) UL (ref 1–4)
LYMPHOCYTES NFR BLD AUTO: 28.3 %
MCH RBC QN AUTO: 32.1 PG (ref 26–34)
MCHC RBC AUTO-ENTMCNC: 33.4 G/DL (ref 31–37)
MCV RBC AUTO: 96.1 FL (ref 80–100)
MICROALBUMIN UR-MCNC: 2.6 MG/DL
MICROALBUMIN/CREAT 24H UR-RTO: 47.3 UG/MG (ref ?–30)
MONOCYTES # BLD AUTO: 0.71 X10(3) UL (ref 0.1–1)
MONOCYTES NFR BLD AUTO: 9.1 %
NEUTROPHILS # BLD AUTO: 4.43 X10 (3) UL (ref 1.5–7.7)
NEUTROPHILS # BLD AUTO: 4.43 X10(3) UL (ref 1.5–7.7)
NEUTROPHILS NFR BLD AUTO: 56.5 %
NONHDLC SERPL-MCNC: 87 MG/DL (ref ?–130)
OSMOLALITY SERPL CALC.SUM OF ELEC: 286 MOSM/KG (ref 275–295)
PLATELET # BLD AUTO: 192 10(3)UL (ref 150–450)
POTASSIUM SERPL-SCNC: 4.7 MMOL/L (ref 3.5–5.1)
PROT SERPL-MCNC: 7.3 G/DL (ref 5.7–8.2)
RBC # BLD AUTO: 4.39 X10(6)UL (ref 3.8–5.8)
SODIUM SERPL-SCNC: 138 MMOL/L (ref 136–145)
TRIGL SERPL-MCNC: 39 MG/DL (ref 30–149)
VLDLC SERPL CALC-MCNC: 6 MG/DL (ref 0–30)
WBC # BLD AUTO: 7.8 X10(3) UL (ref 4–11)

## 2025-04-17 PROCEDURE — 36415 COLL VENOUS BLD VENIPUNCTURE: CPT

## 2025-04-17 PROCEDURE — 82043 UR ALBUMIN QUANTITATIVE: CPT

## 2025-04-17 PROCEDURE — 83036 HEMOGLOBIN GLYCOSYLATED A1C: CPT

## 2025-04-17 PROCEDURE — 80053 COMPREHEN METABOLIC PANEL: CPT

## 2025-04-17 PROCEDURE — 80061 LIPID PANEL: CPT

## 2025-04-17 PROCEDURE — 82570 ASSAY OF URINE CREATININE: CPT

## 2025-04-17 PROCEDURE — 85025 COMPLETE CBC W/AUTO DIFF WBC: CPT

## (undated) DEVICE — CONNECTOR PERF 3/8X0.25IN REDUC STR

## (undated) DEVICE — 3M™ BAIR HUGGER® UNDERBODY BLANKET, FULL ACCESS, 10 PER CASE 63500: Brand: BAIR HUGGER™

## (undated) DEVICE — SUT PROL 4-0 36IN SH NABSRB BLU 26MM 1/2 CIR

## (undated) DEVICE — DRAPE DISC W112XL168CM DISP FOR HUSH SLUSH

## (undated) DEVICE — Device

## (undated) DEVICE — SUT PERMAHAND 1 L30IN N ABSRB BLK TIE SILK

## (undated) DEVICE — CANNULA PERF 6MM TIP 3MM COR ART R ANG

## (undated) DEVICE — NDLCTR: FOAM/ADHESIVE 10CT 96/CS: Brand: MEDICAL ACTION INDUSTRIES

## (undated) DEVICE — PACK PERFUSION CUSTOM W BCARE5

## (undated) DEVICE — SOLUTION IRRIG 1000ML 0.9% NACL USP BTL

## (undated) DEVICE — PD DEFIB QUIK COMB REDI-PK

## (undated) DEVICE — SUT PROL 4-0 36IN RB-1 NABSRB BLU 17MM 1/2 CI

## (undated) DEVICE — LINE SUCKER

## (undated) DEVICE — PRUITT AORTIC OCCLUSION CATHETER, 12F, EIFU: Brand: PRUITT AORTIC OCCLUSION CATHETER

## (undated) DEVICE — A STERILE, SEMI-RIGID TUBE USED IN OPEN HEART SURGERY TO FACILITATE THE TRANSFER OF PRIMING FLUIDS DURING THE PRIMING OF THE EXTRACORPOREAL CIRCUIT OF A CARDIOPULMONARY BYPASS SYSTEM. IT IS A NONINVASIVE PRODUCT IN THE FORM OF A TUBE OR TUBING USED TO CHANNEL THE FLUIDS (I.E., PRIMER/BLOOD) TO FACILITATE PRIMING OF THE EXTRACORPOREAL CIRCUIT AND CONNECTION OF THE CARDIOPULMONARY BYPASS SYSTEM (HEART/LUNG MACHINE) TO THE PATIENT. IT IS TYPICALLY A MOULDED PLASTIC TUBE WITH HARD PLASTIC SPIKE AT THE DISTAL END THAT CONNECTS TO THE BAG OR CONTAINER OF PRIMER SOLUTION, A CONNECTOR AT THE PROXIMAL END, AND A CENTRAL PINCH CLAMP. THIS IS A SINGLE-USE DEVICE.: Brand: QUICKIE PRIME - 1/4" X 1/16"

## (undated) DEVICE — INSERT SUT HLDR RETEN SLOT DISP FOR OCTOBASE

## (undated) DEVICE — CATHETER URETH 18FR RED RUB ALL PURP INTMIT

## (undated) DEVICE — SUCTION CANISTER, 3000CC,SAFELINER: Brand: DEROYAL

## (undated) DEVICE — DRAIN CHST SGL COLL 1 PT TB FOR ATS BG CMPTBL

## (undated) DEVICE — SUT PROL 3-0 30IN NABSRB BLU 22MM SH-1 1/2

## (undated) DEVICE — PACK ASSRY CUSTOM TUBING

## (undated) DEVICE — SUT VCRL 1-0 36IN CTX ABSRB UD L48MM 1/2 CIR

## (undated) DEVICE — 12 FOOT DISPOSABLE EXTENSION CABLE WITH SAFE CONNECT / SCREW-DOWN

## (undated) DEVICE — ADAPTER CRDPLG ANTGRD RTRGD 3W

## (undated) DEVICE — CATHETER PERF 16FR L16IN 20 H TIP L HRT VENT

## (undated) DEVICE — HEART A: Brand: MEDLINE INDUSTRIES, INC.

## (undated) DEVICE — SUT PROL 5-0 36IN C-1 NABSRB BLU 13MM 3/8 CIR

## (undated) DEVICE — SOLUTION IV 1000ML 0.9% NACL PRESERVATIVE

## (undated) DEVICE — DEVICE BLWR/MSTR ACCUMIST ATCH

## (undated) DEVICE — CATHETER THOR 32FR L23IN BLU RADPQ STRP THRM

## (undated) DEVICE — HEART DRAPE AND SUPPLY: Brand: MEDLINE INDUSTRIES, INC.

## (undated) DEVICE — LINE VENT

## (undated) DEVICE — CANNULA SUCT L15IN DIA32/40FR NVENT CONN

## (undated) DEVICE — CARTRIDGE HC HMS+ CRTDG SYR

## (undated) DEVICE — ABSORBABLE HEMOSTAT (OXIDIZED REGENERATED CELLULOSE): Brand: SURGICEL

## (undated) DEVICE — GOWN,SIRUS,FAB REINF,RAGLAN,XL,STERILE: Brand: MEDLINE

## (undated) DEVICE — CS5/5+ FASTPACK, 225ML 150U RES: Brand: HAEMONETICS CELL SAVER 5/5+ SYSTEMS

## (undated) DEVICE — GAMMEX® PI HYBRID SIZE 8, STERILE POWDER-FREE SURGICAL GLOVE, POLYISOPRENE AND NEOPRENE BLEND: Brand: GAMMEX

## (undated) DEVICE — OPTISITE ARTERIAL PERFUSION CANNULA: Brand: OPTISITE ARTERIAL PERFUSION CANNULA

## (undated) DEVICE — SUT ETHBND XL 2-0 30IN V-5 NABSRB GRN WHT L17

## (undated) DEVICE — HANDLE SUR BLU PLAS LT FLX SLIP ON ST DISP

## (undated) DEVICE — CATHETER URETH 18FR RED RUB INTMIT ALL PURP

## (undated) DEVICE — WAX BNE 2.5GM BEESWAX PAR AND ISO PALMITATE

## (undated) DEVICE — COVER CAMERA LIGHT HANDLE

## (undated) DEVICE — SUT PERMA- 2-0 18IN SH NABSRB BLK CR 26MM

## (undated) DEVICE — SUT ETHBND XL 0 30IN CT-1 NABSRB GRN 36MM 1/2

## (undated) DEVICE — SUT PROL SZ 5-0 36IN RB-1 NABSRB BL

## (undated) DEVICE — DRESSING FOAM 4.25IN LEN 12.5IN W WND PD N

## (undated) DEVICE — FORESIGHT LARGE SENSOR: Brand: FORESIGHT

## (undated) DEVICE — CANNULA PERF PED AD 9FR L5.5IN AORT ROOT FLNG

## (undated) DEVICE — KIT HEMSTAT MTRX 8ML PORCINE GEL HUM THROM

## (undated) DEVICE — RETROGRADE CARDIOPLEGIA CATHETER: Brand: EDWARDS LIFESCIENCES RETROGRADE CARDIOPLEGIA CATHETER

## (undated) DEVICE — 3M™ BAIR HUGGER® CARDIAC ACCESS BLANKET, 5 PER CASE 63000: Brand: BAIR HUGGER™

## (undated) NOTE — LETTER
[facility] CV SURGERY  [address] [facility] [address]  [facility] [address]  [phone]    Blood Transfusion Consent    In the course of your treatment, it may become necessary to administer a transfusion of blood or blood components. This form provides basic information concerning this procedure and, if signed by you, authorizes its administration. By signing this form, you agree that all of your questions about the administration of blood or blood products have been answered by the ordering medical professional or designee.    Description of Procedure  Blood is introduced into one of your veins, commonly in the arm, using a sterilized disposable needle. The amount of blood transfused, and whether the transfusion will be of blood or blood components is a judgement the physician will make based on your particular needs.    Risks  The transfusion is a common procedure of low risk.  MINOR AND TEMPORARY REACTIONS ARE NOT UNCOMMON, including a slight bruise, swelling or local reaction in the area where the needle pierces your skin, or a nonserious reaction to the transfused material itself, including headache, fever or mild skin reaction, such as rash.  Serious reactions are possible, though very unlikely, and include severe allergic reaction (shock) and destruction (hemolysis) of transfused blood cells.  Infectious diseases which are known to be transmitted by blood transfusion include certain types of viral Hepatitis(liver infection from a virus), Human Immunodeficiency Virus (HIV-1,2) infection, a viral infection known to cause Acquired Immunodeficiency Syndrome (AIDS), as well as certain other bacterial, viral, and parasitic diseases. While a minimal risk of acquiring an infectious disease from transfused blood exists, in accordance with the Federal and State law, all due care has been taken in donor selection and testing to avoid transmission of disease.    Alternatives  If loss of blood poses serious threats during  your treatment, THERE IS NO EFFECTIVE ALTERNATIVE TO BLOOD TRANSFUSION. However, if you have any further questions on this matter, your provider will fully explain the alternatives to you if it has not already been done.    I, ______________________________, have read/had read to me the above. I understand the matters bearing on the decision whether or not to authorize a transfusion of blood or blood components. I have no questions which have not been answered to my full satisfaction. I hereby consent to such transfusion as my physician may deem necessary or advisable in the course of my treatment.    ______________________________________________                    ___________________________  (Signature of Patient or Responsible party in case of minor,                 (Printed Name of Patient or incompetent, or unconscious patient)              Responsible Party)    ___________________________               _____________________  (Relationship to Patient if not self)                                    (Date and Time)    __________________________                                                           ______________________              (Signature of Witness)               (Printed Name of Witness)     Language line ()    Telephone/Verbal/Video Consent    __________________________                     ____________________  (Signature of 2nd Witness           (Printed Name of 2nd  Telephone/Verbal/Video Consent)           Witness)    Patient Name: Talib Gonzalez     : 1950                 Printed: 2024     Medical Record #: [id number]      Rev: 2023

## (undated) NOTE — LETTER
[facility]  [address], [place], [place]  Authorization for Surgical Operation and Procedure                                                                                           I hereby authorize  Dr. Oleary and Dr. Taveras, my physician and his/her assistants (if applicable), which may include medical students, residents, and/or fellows, to perform the following surgical operation/ procedure and administer such anesthesia as may be determined necessary by my physician: Emergent Aortic Dissection Repair  on Talib Gonzalez   2.   I recognize that during the surgical operation/procedure, unforeseen conditions may necessitate additional or different procedures than those listed above.  I, therefore, further authorize and request that the above-named surgeon, assistants, or designees perform such procedures as are, in their judgment, necessary and desirable.    3.   My surgeon/physician has discussed prior to my surgery the potential benefits, risks and side effects of this procedure; the likelihood of achieving goals; and potential problems that might occur during recuperation.  They also discussed reasonable alternatives to the procedure, including risks, benefits, and side effects related to the alternatives and risks related to not receiving this procedure.  I have had all my questions answered and I acknowledge that no guarantee has been made as to the result that may be obtained.    4.   Should the need arise during my operation/procedure, which includes change of level of care prior to discharge, I also consent to the administration of blood and/or blood products.  Further, I understand that despite careful testing and screening of blood or blood products by collecting agencies, I may still be subject to ill effects as a result of receiving a blood transfusion and/or blood products.  The following are some, but not all, of the potential risks that can occur: fever and allergic  reactions, hemolytic reactions, transmission of diseases such as Hepatitis, AIDS and Cytomegalovirus (CMV) and fluid overload.  In the event that I wish to have an autologous transfusion of my own blood, or a directed donor transfusion, I will discuss this with my physician.  Check only if Refusing Blood or Blood Products  I understand refusal of blood or blood products as deemed necessary by my physician may have serious consequences to my condition to include possible death. I hereby assume responsibility for my refusal and release the hospital, its personnel, and my physicians from any responsibility for the consequences of my refusal.    o  Refuse   5.   I authorize the use of any specimen, organs, tissues, body parts or foreign objects that may be removed from my body during the operation/procedure for diagnosis, research or teaching purposes and their subsequent disposal by hospital authorities.  I also authorize the release of specimen test results and/or written reports to my treating physician on the hospital medical staff or other referring or consulting physicians involved in my care, at the discretion of the Pathologist or my treating physician.    6.   I consent to the photographing or videotaping of the operations or procedures to be performed, including appropriate portions of my body for medical, scientific, or educational purposes, provided my identity is not revealed by the pictures or by descriptive texts accompanying them.  If the procedure has been photographed/videotaped, the surgeon will obtain the original picture, image, videotape or CD.  The hospital will not be responsible for storage, release or maintenance of the picture, image, tape or CD.    7.   I consent to the presence of a  or observers in the operating room as deemed necessary by my physician or their designees.    8.   I recognize that in the event my procedure results in extended X-Ray/fluoroscopy time, I may  develop a skin reaction.    9. If I have a Do Not Attempt Resuscitation (DNAR) order in place, that status will be suspended while in the operating room, procedural suite, and during the recovery period unless otherwise explicitly stated by me (or a person authorized to consent on my behalf). The surgeon or my attending physician will determine when the applicable recovery period ends for purposes of reinstating the DNAR order.  10. Patients having a sterilization procedure: I understand that if the procedure is successful the results will be permanent and it will therefore be impossible for me to inseminate, conceive, or bear children.  I also understand that the procedure is intended to result in sterility, although the result has not been guaranteed.   11. I acknowledge that my physician has explained sedation/analgesia administration to me including the risk and benefits I consent to the administration of sedation/analgesia as may be necessary or desirable in the judgment of my physician.    I CERTIFY THAT I HAVE READ AND FULLY UNDERSTAND THE ABOVE CONSENT TO OPERATION and/or OTHER PROCEDURE.     _________________________________________ _________________________________     ___________________________________  Signature of Patient     Signature of Responsible Person                   Printed Name of Responsible Person                              _________________________________________ ______________________________        ___________________________________  Signature of Witness         Date  Time         Relationship to Patient    STATEMENT OF PHYSICIAN My signature below affirms that prior to the time of the procedure; I have explained to the patient and/or his/her legal representative, the risks and benefits involved in the proposed treatment and any reasonable alternative to the proposed treatment. I have also explained the risks and benefits involved in refusal of the proposed treatment and alternatives  to the proposed treatment and have answered the patient's questions. If I have a significant financial interest in a co-management agreement or a significant financial interest in any product or implant, or other significant relationship used in this procedure/surgery, I have disclosed this and had a discussion with my patient.     _______________________________________________________________ _____________________________  (Signature of Physician)                                                                                         (Date)                                   (Time)  Patient Name: Talib Gonzalez    : 1950   Printed: 3/21/2024      Medical Record #: [id number]                                              Page 1 of 1

## (undated) NOTE — LETTER
[facility]  Administration of Anesthesia  I, Talib Gonzalez agree to be cared for by a physician anesthesiologist alone and/or with a nurse anesthetist, who is specially trained to monitor me and give me medicine to put me to sleep or keep me comfortable during my procedure    I understand that my anesthesiologist and/or anesthetist is not an employee or agent of [facility] or [facility]. He or she works for [facility] [facility]    As the patient asking for anesthesia services, I agree to:  Allow the anesthesiologist (anesthesia doctor) to give me medicine and do additional procedures as necessary. Some examples are: Starting or using an “IV” to give me medicine, fluids or blood during my procedure, and having a breathing tube placed to help me breathe when I’m asleep (intubation). In the event that my heart stops working properly, I understand that my anesthesiologist will make every effort to sustain my life, unless otherwise directed by [facility] Do Not Resuscitate documents.  Tell my anesthesia doctor before my procedure:  If I am pregnant.  The last time that I ate or drank.  iii. All of the medicines I take (including prescriptions, herbal supplements, and pills I can buy without a prescription (including street drugs/illegal medications). Failure to inform my anesthesiologist about these medicines may increase my risk of anesthetic complications.  iv.If I am allergic to anything or have had a reaction to anesthesia before.  I understand how the anesthesia medicine will help me (benefits).  I understand that with any type of anesthesia medicine there are risks:  The most common risks are: nausea, vomiting, sore throat, muscle soreness, damage to my eyes, mouth, or teeth (from breathing tube placement).  Rare risks include: remembering what happened during my procedure, allergic reactions to medications, injury to my airway, heart, lungs, vision, nerves, or  muscles and in extremely rare instances death.  My doctor has explained to me other choices available to me for my care (alternatives).  Pregnant Patients (“epidural”):  I understand that the risks of having an epidural (medicine given into my back to help control pain during labor), include itching, low blood pressure, difficulty urinating, headache or slowing of the baby’s heart. Very rare risks include infection, bleeding, seizure, irregular heart rhythms and nerve injury.  Regional Anesthesia (“spinal”, “epidural”, & “nerve blocks”):  I understand that rare but potential complications include headache, bleeding, infection, seizure, irregular heart rhythms, and nerve injury.    _____________________________________________________________________________  Patient (or Representative) Signature/Relationship to Patient  Date   Time    _____________________________________________________________________________   Name (if used)    Language/Organization   Time    _____________________________________________________________________________  Nurse Anesthetist Signature     Date   Time  _____________________________________________________________________________  Anesthesiologist Signature     Date   Time  I have discussed the procedure and information above with the patient (or patient’s representative) and answered their questions. The patient or their representative has agreed to have anesthesia services.    _____________________________________________________________________________  Witness        Date   Time  I have verified that the signature is that of the patient or patient’s representative, and that it was signed before the procedure  Patient Name: Talib Gonzalez     : 1950                 Printed: 3/21/2024 at 12:00 PM    Medical Record #: [id number]                                            Page 1 of 1  ----------ANESTHESIA CONSENT----------

## (undated) NOTE — LETTER
[facility]  [address], [place], [place]  Authorization for Surgical Operation and Procedure                                                                                           I hereby authorize Dr. Bowling & Dr. Taveras, my physician and his/her assistants (if applicable), which may include medical students, residents, and/or fellows, to perform the following surgical operation/ procedure and administer such anesthesia as may be determined necessary by my physician: Emergent Aortic Dissection Repair  on Talib Gonzalez   2.   I recognize that during the surgical operation/procedure, unforeseen conditions may necessitate additional or different procedures than those listed above.  I, therefore, further authorize and request that the above-named surgeon, assistants, or designees perform such procedures as are, in their judgment, necessary and desirable.    3.   My surgeon/physician has discussed prior to my surgery the potential benefits, risks and side effects of this procedure; the likelihood of achieving goals; and potential problems that might occur during recuperation.  They also discussed reasonable alternatives to the procedure, including risks, benefits, and side effects related to the alternatives and risks related to not receiving this procedure.  I have had all my questions answered and I acknowledge that no guarantee has been made as to the result that may be obtained.    4.   Should the need arise during my operation/procedure, which includes change of level of care prior to discharge, I also consent to the administration of blood and/or blood products.  Further, I understand that despite careful testing and screening of blood or blood products by collecting agencies, I may still be subject to ill effects as a result of receiving a blood transfusion and/or blood products.  The following are some, but not all, of the potential risks that can occur: fever and allergic reactions,  hemolytic reactions, transmission of diseases such as Hepatitis, AIDS and Cytomegalovirus (CMV) and fluid overload.  In the event that I wish to have an autologous transfusion of my own blood, or a directed donor transfusion, I will discuss this with my physician.  Check only if Refusing Blood or Blood Products  I understand refusal of blood or blood products as deemed necessary by my physician may have serious consequences to my condition to include possible death. I hereby assume responsibility for my refusal and release the hospital, its personnel, and my physicians from any responsibility for the consequences of my refusal.    o  Refuse   5.   I authorize the use of any specimen, organs, tissues, body parts or foreign objects that may be removed from my body during the operation/procedure for diagnosis, research or teaching purposes and their subsequent disposal by hospital authorities.  I also authorize the release of specimen test results and/or written reports to my treating physician on the hospital medical staff or other referring or consulting physicians involved in my care, at the discretion of the Pathologist or my treating physician.    6.   I consent to the photographing or videotaping of the operations or procedures to be performed, including appropriate portions of my body for medical, scientific, or educational purposes, provided my identity is not revealed by the pictures or by descriptive texts accompanying them.  If the procedure has been photographed/videotaped, the surgeon will obtain the original picture, image, videotape or CD.  The hospital will not be responsible for storage, release or maintenance of the picture, image, tape or CD.    7.   I consent to the presence of a  or observers in the operating room as deemed necessary by my physician or their designees.    8.   I recognize that in the event my procedure results in extended X-Ray/fluoroscopy time, I may develop a  skin reaction.    9. If I have a Do Not Attempt Resuscitation (DNAR) order in place, that status will be suspended while in the operating room, procedural suite, and during the recovery period unless otherwise explicitly stated by me (or a person authorized to consent on my behalf). The surgeon or my attending physician will determine when the applicable recovery period ends for purposes of reinstating the DNAR order.  10. Patients having a sterilization procedure: I understand that if the procedure is successful the results will be permanent and it will therefore be impossible for me to inseminate, conceive, or bear children.  I also understand that the procedure is intended to result in sterility, although the result has not been guaranteed.   11. I acknowledge that my physician has explained sedation/analgesia administration to me including the risk and benefits I consent to the administration of sedation/analgesia as may be necessary or desirable in the judgment of my physician.    I CERTIFY THAT I HAVE READ AND FULLY UNDERSTAND THE ABOVE CONSENT TO OPERATION and/or OTHER PROCEDURE.     _________________________________________ _________________________________     ___________________________________  Signature of Patient     Signature of Responsible Person                   Printed Name of Responsible Person                              _________________________________________ ______________________________        ___________________________________  Signature of Witness         Date  Time         Relationship to Patient    STATEMENT OF PHYSICIAN My signature below affirms that prior to the time of the procedure; I have explained to the patient and/or his/her legal representative, the risks and benefits involved in the proposed treatment and any reasonable alternative to the proposed treatment. I have also explained the risks and benefits involved in refusal of the proposed treatment and alternatives to the  proposed treatment and have answered the patient's questions. If I have a significant financial interest in a co-management agreement or a significant financial interest in any product or implant, or other significant relationship used in this procedure/surgery, I have disclosed this and had a discussion with my patient.     _______________________________________________________________ _____________________________  (Signature of Physician)                                                                                         (Date)                                   (Time)  Patient Name: Talib Gonzalez    : 1950   Printed: 3/21/2024      Medical Record #: [id number]                                              Page 1 of 1

## (undated) NOTE — LETTER
Hospital Discharge Documentation  Please phone to schedule a hospital follow up appointment. No discharge summary available at this time, below is the most recent progress note  for your review .        From: [facility] Hospitalist's Office  Phone: [phone]    Patient discharged time/date: 3/28/2024  1:19 PM  Patient discharge disposition:  Home or Self Care       Discharge Summary - D/C Summary        Discharge Summary signed by Cesar Cortez MD at 3/28/2024  7:36 PM  Version 2 of 2      Author: Cesar Cortez MD Service: Hospitalist Author Type: Physician    Filed: 3/28/2024  7:36 PM Date of Service: 3/28/2024 11:53 AM Status: Addendum    : Cesar Cortez MD (Physician)    Related Notes: Original Note by Cesar Cortez MD (Physician) filed at 3/28/2024 11:55 AM         Dc summary#[id number]  > 30 min spent on dc    Hospital Discharge Diagnoses: type a aoritic dissection and repair    Lace+ Score: 49  59-90 High Risk  29-58 Medium Risk  0-28   Low Risk.    TCM Follow-Up Recommendation:  LACE > 58: High Risk of readmission after discharge from the hospital.tcm fu recommended       Electronically signed by Cesar Cortez MD on 3/28/2024  7:36 PM       Discharge Summary signed by Cesar Cortez MD at 3/28/2024 11:55 AM  Version 1 of 2      Author: Cesar Cortez MD Service: Hospitalist Author Type: Physician    Filed: 3/28/2024 11:55 AM Date of Service: 3/28/2024 11:53 AM Status: Signed    : Cesar Cortez MD (Physician)    Related Notes: Addendum by Cesar Cortez MD (Physician) filed at 3/28/2024  7:36 PM         Dc summary#  > 30 min spent on dc    Hospital Discharge Diagnoses: type a aoritic dissection and repair    Lace+ Score: 49  59-90 High Risk  29-58 Medium Risk  0-28   Low Risk.    TCM Follow-Up Recommendation:  LACE > 58: High Risk of readmission after discharge from the hospital.tcm fu recommended        Electronically signed by Cesar Cortez MD on 3/28/2024 11:55 AM          [facility]  part of [facility]     Progress Note           Talib Gonzalez Patient Status:  Inpatient    1950 MRN [id number]   Location [facility] 2W/SW Attending Cesar Cortez,*   Hosp Day # 6 PCP No primary care provider on file.      Chief Complaint: hurts when cough        Subjective:      Constitutional:  Positive for fatigue. Negative for appetite change.   HENT:  Negative for congestion.    Respiratory:  Positive for cough. Negative for shortness of breath.    Cardiovascular:  Positive for chest pain.   Gastrointestinal:  Negative for heartburn, nausea, abdominal pain and diarrhea.   Genitourinary:  Negative for dysuria.   Musculoskeletal:  Negative for joint swelling and joint pain.   Neurological:  Positive for weakness.   Psychiatric/Behavioral:  Positive for sleep disturbance. The patient is nervous/anxious.                Objective:   Blood pressure 140/56, pulse 75, temperature 97.5 °F (36.4 °C), temperature source Temporal, resp. rate 26, height 6' (1.829 m), weight 230 lb 13.2 oz (104.7 kg), SpO2 95%.  Physical Exam  Vitals and nursing note reviewed.   Constitutional:       Appearance: He is obese. He is ill-appearing.   HENT:      Head: Normocephalic and atraumatic.   Cardiovascular:      Rate and Rhythm: Normal rate and regular rhythm.      Pulses: Normal pulses.      Heart sounds: Murmur heard.   Pulmonary:      Breath sounds: Rhonchi present. No wheezing.   Abdominal:      General: Bowel sounds are normal.      Palpations: Abdomen is soft.   Skin:     General: Skin is warm and dry.      Capillary Refill: Capillary refill takes less than 2 seconds.   Neurological:      General: No focal deficit present.      Mental Status: He is alert and oriented to person, place, and time.   Psychiatric:         Behavior: Behavior normal.         Judgment: Judgment normal.                   Results:         Lab Results   Component Value Date     WBC 13.9 (H) 03/27/2024     HGB 9.2 (L) 03/27/2024     HCT 27.9 (L) 03/27/2024     .0 (L) 03/27/2024     CREATSERUM 0.78 03/27/2024     BUN 18 03/27/2024      (H) 03/27/2024     K 3.4 (L) 03/27/2024      (H) 03/27/2024     CO2 26.0 03/27/2024      (H) 03/27/2024     CA 8.5 (L) 03/27/2024     ALB 3.7 03/30/2023     ALKPHO 74 03/30/2023     BILT 0.5 03/30/2023     TP 7.4 03/30/2023     AST 21 03/30/2023     ALT 26 03/30/2023     PTT 37.3 (H) 03/21/2024     INR 1.50 (H) 03/21/2024     TSH 0.643 03/24/2024     PSA 4.0 12/05/2017     DDIMER >20.00 (H) 03/21/2024     MG 1.9 03/24/2024         No results found.               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 Cards on consult. Tele monitor.      Acute blood loss anemia - expected. Cont to monitor , cbc daily      Thrombocytopenia - monitor cbc daily      Hyperglycemia - cont iss      Hypernatremia - cont to push water      DVT ppx lovenox      PT/OT      Hyperchlorremia obs     Complex mdm; coordinating care with nurse and counseling pt and with his permission his daughters in room about inc tashi/seriousness of condition              JUAN DANIEL CORTEZ MD                                          Electronically signed by Juan Daniel Cortez MD at 3/27/2024 12:31 PM